# Patient Record
Sex: FEMALE | Race: WHITE | Employment: OTHER | URBAN - METROPOLITAN AREA
[De-identification: names, ages, dates, MRNs, and addresses within clinical notes are randomized per-mention and may not be internally consistent; named-entity substitution may affect disease eponyms.]

---

## 2020-08-06 VITALS
WEIGHT: 164 LBS | HEIGHT: 65 IN | DIASTOLIC BLOOD PRESSURE: 80 MMHG | RESPIRATION RATE: 18 BRPM | OXYGEN SATURATION: 97 % | BODY MASS INDEX: 27.32 KG/M2 | HEART RATE: 95 BPM | SYSTOLIC BLOOD PRESSURE: 153 MMHG | TEMPERATURE: 98.4 F

## 2020-08-06 PROBLEM — E03.9 ACQUIRED HYPOTHYROIDISM: Status: ACTIVE | Noted: 2020-08-06

## 2020-08-06 PROBLEM — E78.5 HYPERLIPIDEMIA: Status: ACTIVE | Noted: 2020-08-06

## 2020-08-06 PROBLEM — I10 ESSENTIAL HYPERTENSION: Status: ACTIVE | Noted: 2020-08-06

## 2020-08-06 PROBLEM — K27.9 PEPTIC ULCER: Status: ACTIVE | Noted: 2020-08-06

## 2020-08-06 PROBLEM — F41.9 ANXIETY DISORDER: Status: ACTIVE | Noted: 2020-08-06

## 2020-08-06 PROBLEM — I83.90 VARICOSE VEINS OF LOWER EXTREMITY: Status: ACTIVE | Noted: 2020-08-06

## 2020-08-06 PROBLEM — F90.0 ATTENTION-DEFICIT HYPERACTIVITY DISORDER, PREDOMINANTLY INATTENTIVE TYPE: Status: ACTIVE | Noted: 2020-08-06

## 2020-08-06 RX ORDER — DEXTROAMPHETAMINE SACCHARATE, AMPHETAMINE ASPARTATE, DEXTROAMPHETAMINE SULFATE AND AMPHETAMINE SULFATE 7.5; 7.5; 7.5; 7.5 MG/1; MG/1; MG/1; MG/1
TABLET ORAL
COMMUNITY
End: 2020-08-09 | Stop reason: SDUPTHER

## 2020-08-06 RX ORDER — LEVOTHYROXINE SODIUM 200 UG/1
TABLET ORAL
COMMUNITY
Start: 2020-07-29 | End: 2020-10-21

## 2020-08-06 RX ORDER — ERGOCALCIFEROL 1.25 MG/1
CAPSULE ORAL
COMMUNITY
End: 2020-09-28

## 2020-08-06 RX ORDER — PAROXETINE HYDROCHLORIDE 40 MG/1
TABLET, FILM COATED ORAL
COMMUNITY
Start: 2020-07-10 | End: 2020-10-15

## 2020-08-06 RX ORDER — PANTOPRAZOLE SODIUM 40 MG/1
TABLET, DELAYED RELEASE ORAL
COMMUNITY
Start: 2020-06-07 | End: 2020-09-07

## 2020-08-06 RX ORDER — SOLIFENACIN SUCCINATE 10 MG/1
TABLET, FILM COATED ORAL
COMMUNITY

## 2020-08-06 RX ORDER — AMLODIPINE BESYLATE 5 MG/1
TABLET ORAL
COMMUNITY
Start: 2020-07-10 | End: 2020-10-15

## 2020-08-07 ENCOUNTER — VIRTUAL VISIT (OUTPATIENT)
Dept: PRIMARY CARE CLINIC | Age: 70
End: 2020-08-07
Payer: MEDICARE

## 2020-08-07 DIAGNOSIS — E78.2 MIXED HYPERLIPIDEMIA: ICD-10-CM

## 2020-08-07 DIAGNOSIS — F90.0 ATTENTION-DEFICIT HYPERACTIVITY DISORDER, PREDOMINANTLY INATTENTIVE TYPE: ICD-10-CM

## 2020-08-07 DIAGNOSIS — I10 ESSENTIAL HYPERTENSION: Primary | ICD-10-CM

## 2020-08-07 DIAGNOSIS — R60.0 PEDAL EDEMA: ICD-10-CM

## 2020-08-07 DIAGNOSIS — E03.9 ACQUIRED HYPOTHYROIDISM: ICD-10-CM

## 2020-08-07 PROCEDURE — G8536 NO DOC ELDER MAL SCRN: HCPCS | Performed by: FAMILY MEDICINE

## 2020-08-07 PROCEDURE — 99213 OFFICE O/P EST LOW 20 MIN: CPT | Performed by: FAMILY MEDICINE

## 2020-08-07 PROCEDURE — G8427 DOCREV CUR MEDS BY ELIG CLIN: HCPCS | Performed by: FAMILY MEDICINE

## 2020-08-07 PROCEDURE — G8432 DEP SCR NOT DOC, RNG: HCPCS | Performed by: FAMILY MEDICINE

## 2020-08-07 PROCEDURE — G8419 CALC BMI OUT NRM PARAM NOF/U: HCPCS | Performed by: FAMILY MEDICINE

## 2020-08-07 PROCEDURE — G8400 PT W/DXA NO RESULTS DOC: HCPCS | Performed by: FAMILY MEDICINE

## 2020-08-09 RX ORDER — DEXTROAMPHETAMINE SACCHARATE, AMPHETAMINE ASPARTATE, DEXTROAMPHETAMINE SULFATE AND AMPHETAMINE SULFATE 7.5; 7.5; 7.5; 7.5 MG/1; MG/1; MG/1; MG/1
30 TABLET ORAL DAILY
Qty: 90 TAB | Refills: 0 | Status: SHIPPED | OUTPATIENT
Start: 2020-08-09 | End: 2021-04-19 | Stop reason: SDUPTHER

## 2020-08-10 NOTE — PATIENT INSTRUCTIONS
Raynaud's Phenomenon: Care Instructions Overview Raynaud's is a condition that causes your hands and feet to overreact to cold. They may become painful and numb, and they can change colors, becoming very pale and then blue. This condition also is called Raynaud's phenomenon. There are two kinds of Raynaud's. Primary Raynaud's, also known as Raynaud's disease, happens by itself and is the most common form. Secondary Raynaud's, also called Raynaud's syndrome, happens as part of another disease. In Raynaud's, the small vessels that bring blood to the skin either become narrow, or constrict for a short period of time. This limits blood flow to the hands and feet and sometimes to the nose or ears. Your hands and feet feel cold and numb and then turn very pale. As blood flow returns, your fingers and toes may turn red, and begin to throb and hurt. Raynaud's can be painful and annoying, but it usually does not cause serious problems. You can take simple steps to protect your hands and feet from the cold. If you have a bad case of Raynaud's and you cannot keep your hands and feet warm enough, your doctor may prescribe medicine. Follow-up care is a key part of your treatment and safety. Be sure to make and go to all appointments, and call your doctor if you are having problems. It's also a good idea to know your test results and keep a list of the medicines you take. How can you care for yourself at home? To prevent Raynaud's episodes or ease symptoms · Run warm water over your hands or feet to increase blood flow. Use another part of your body, such as your forearm, to make sure the water is not too hot; you could burn your hands or feet and not feel it because they are numb. · Swing your arms in a [place] at the sides of your body (\"windmilling\") to increase blood flow.  
· If your doctor prescribes medicine to help Raynaud's, take it exactly as prescribed. Call your doctor if you think you are having a problem with your medicine. · If another condition causes your Raynaud's, make sure to follow your treatment for that condition. · Wear mittens or gloves when it is cold outside. Mittens are warmer than gloves because they keep your fingers together. Gloves underneath mittens will keep your hands warmer than gloves alone. You also can use pot holders or oven mitts when getting something from the freezer or refrigerator. · You can slip chemical hand warmers into your mittens or gloves when you do outside activities. · Do not smoke. Nicotine makes blood vessels constrict, which can bring on an attack. If you need help quitting, talk to your doctor about stop-smoking programs and medicines. These can increase your chances of quitting for good. · Avoid caffeine and cold medicines that have pseudoephedrine. They make blood vessels constrict. Beta-blocker medicines, often used to treat high blood pressure, also can make Raynaud's worse. · Drink plenty of fluids to prevent dehydration, which can lower the amount of blood moving through the blood vessels. If you have kidney, heart, or liver disease and have to limit fluids, talk with your doctor before you increase the amount of fluids you drink. · Try to stay calm when you are under stress. Anxiety can make your blood vessels constrict and lead to a Raynaud's attack. To keep your whole body warm · Eat a hot meal and drink a warm liquid before going outside. They may help raise your body temperature. · Wear layers of warm clothing. The inner layer should be made of a material such as polypropylene that pulls moisture away from your body. · Wear a hat. · Do not wear clothing that is too tight. It can decrease or cut off blood flow. · Try to stay dry. Choose waterproof, breathable jackets and boots. Being wet makes you more likely to become chilled. When should you call for help? Call your doctor now or seek immediate medical care if: 
· You have severe pain in your hands or feet. · Normal color does not return to your hands or feet. · Your hands or feet do not warm up even after home care. Watch closely for changes in your health, and be sure to contact your doctor if you have any problems. Where can you learn more? Go to http://erica-emelyn.info/ Enter P043 in the search box to learn more about \"Raynaud's Phenomenon: Care Instructions. \" Current as of: December 9, 2019               Content Version: 12.5 © 4815-8810 [employer], [employer]. Care instructions adapted under license by [employer] (which disclaims liability or warranty for this information). If you have questions about a medical condition or this instruction, always ask your healthcare professional. [address] any warranty or liability for your use of this information.

## 2020-08-10 NOTE — PROGRESS NOTES
Sonali Hutson is a 69 y.o. female who was seen by synchronous (real-time) audio-video technology on 8/7/2020 for Attention Deficit Disorder        Assessment & Plan:   Diagnoses and all orders for this visit:    1. Essential hypertension    2. Acquired hypothyroidism    3. Attention-deficit hyperactivity disorder, predominantly inattentive type    4. Mixed hyperlipidemia    5. Pedal edema            Subjective: This patient agrees to a virtual visit to refill her medication and to discuss her rainouts phenomenon. She has been taking amlodipine 5 mg for this and it is causing swelling in her legs which she is concerned about. It did help the symptoms however she would like something different. She has not gotten the lab work we ordered at her last visit and she plans to do that in the next couple weeks. Overall she is doing well and working more than she had in the past as patients want to come in and see her rather than do virtual visits. She seems to be doing okay with that. Prior to Admission medications    Medication Sig Start Date End Date Taking? Authorizing Provider   amLODIPine (NORVASC) 5 mg tablet TAKE 1 TABLET BY MOUTH EVERY DAY 7/10/20   Provider, Historical   dextroamphetamine-amphetamine (ADDERALL) 30 mg tablet dextroamphetamine-amphetamine 30 mg tablet   Take 1 tablet every day by oral route.     Provider, Historical   ergocalciferol (ERGOCALCIFEROL) 1,250 mcg (50,000 unit) capsule ergocalciferol (vitamin D2) 1,250 mcg (50,000 unit) capsule   TAKE ONE CAPSULE BY MOUTH ONE TIME PER WEEK    Provider, Historical   levothyroxine (SYNTHROID) 200 mcg tablet TAKE 1 TABLET BY MOUTH EVERY DAY 7/29/20   Provider, Historical   pantoprazole (PROTONIX) 40 mg tablet TAKE 1 TABLET BY MOUTH EVERY DAY 6/7/20   Provider, Historical   PARoxetine (PAXIL) 40 mg tablet TAKE 1 TABLET BY MOUTH EVERY DAY 7/10/20   Provider, Historical   solifenacin (VESIcare) 10 mg tablet Vesicare 10 mg tablet   TAKE 1 TABLET BY MOUTH EVERY DAY    Provider, Historical     Patient Active Problem List   Diagnosis Code    Essential hypertension I10    Hyperlipidemia E78.5    OA (osteoarthritis) of knee M17.10    Varicose veins of lower extremity I83.90    Anxiety disorder F41.9    Attention-deficit hyperactivity disorder, predominantly inattentive type F90.0    Acquired hypothyroidism E03.9    Peptic ulcer K27.9     Patient Active Problem List    Diagnosis Date Noted    Essential hypertension 08/06/2020    Hyperlipidemia 08/06/2020    Varicose veins of lower extremity 08/06/2020    Anxiety disorder 08/06/2020    Attention-deficit hyperactivity disorder, predominantly inattentive type 08/06/2020    Acquired hypothyroidism 08/06/2020    Peptic ulcer 08/06/2020    OA (osteoarthritis) of knee 06/06/2014     Current Outpatient Medications   Medication Sig Dispense Refill    amLODIPine (NORVASC) 5 mg tablet TAKE 1 TABLET BY MOUTH EVERY DAY      dextroamphetamine-amphetamine (ADDERALL) 30 mg tablet dextroamphetamine-amphetamine 30 mg tablet   Take 1 tablet every day by oral route.       ergocalciferol (ERGOCALCIFEROL) 1,250 mcg (50,000 unit) capsule ergocalciferol (vitamin D2) 1,250 mcg (50,000 unit) capsule   TAKE ONE CAPSULE BY MOUTH ONE TIME PER WEEK      levothyroxine (SYNTHROID) 200 mcg tablet TAKE 1 TABLET BY MOUTH EVERY DAY      pantoprazole (PROTONIX) 40 mg tablet TAKE 1 TABLET BY MOUTH EVERY DAY      PARoxetine (PAXIL) 40 mg tablet TAKE 1 TABLET BY MOUTH EVERY DAY      solifenacin (VESIcare) 10 mg tablet Vesicare 10 mg tablet   TAKE 1 TABLET BY MOUTH EVERY DAY       Allergies   Allergen Reactions    Codeine Rash     Past Medical History:   Diagnosis Date    Acquired hypothyroidism 8/6/2020    Attention-deficit hyperactivity disorder, predominantly inattentive type 8/6/2020    Essential hypertension 8/6/2020    Hyperlipidemia 8/6/2020    OA (osteoarthritis) of knee 6/6/2014    Peptic ulcer 8/6/2020    Varicose veins of lower extremity 8/6/2020     Past Surgical History:   Procedure Laterality Date    HX COLONOSCOPY  07/13/2006    HX KNEE REPLACEMENT Right 01/2007    HX ORTHOPAEDIC      Right knee     Family History   Family history unknown: Yes     Social History     Tobacco Use    Smoking status: Former Smoker    Smokeless tobacco: Never Used   Substance Use Topics    Alcohol use: Yes     Comment: Occasionally       ROS    Objective:   No flowsheet data found.      [INSTRUCTIONS:  \"[x]\" Indicates a positive item  \"[]\" Indicates a negative item  -- DELETE ALL ITEMS NOT EXAMINED]    Constitutional: [x] Appears well-developed and well-nourished [x] No apparent distress      [] Abnormal -     Mental status: [x] Alert and awake  [x] Oriented to person/place/time [x] Able to follow commands    [] Abnormal -     Eyes:   EOM    [x]  Normal    [] Abnormal -   Sclera  [x]  Normal    [] Abnormal -          Discharge [x]  None visible   [] Abnormal -     HENT: [x] Normocephalic, atraumatic  [] Abnormal -   [x] Mouth/Throat: Mucous membranes are moist    External Ears [x] Normal  [] Abnormal -    Neck: [x] No visualized mass [] Abnormal -     Pulmonary/Chest: [x] Respiratory effort normal   [x] No visualized signs of difficulty breathing or respiratory distress        [] Abnormal -      Musculoskeletal:   [x] Normal gait with no signs of ataxia         [x] Normal range of motion of neck        [] Abnormal -     Neurological:        [x] No Facial Asymmetry (Cranial nerve 7 motor function) (limited exam due to video visit)          [x] No gaze palsy        [] Abnormal -          Skin:        [x] No significant exanthematous lesions or discoloration noted on facial skin         [] Abnormal -            Psychiatric:       [x] Normal Affect [] Abnormal -        [x] No Hallucinations    Other pertinent observable physical exam findings:-        We discussed the expected course, resolution and complications of the diagnosis(es) in detail. Medication risks, benefits, costs, interactions, and alternatives were discussed as indicated. I advised her to contact the office if her condition worsens, changes or fails to improve as anticipated. She expressed understanding with the diagnosis(es) and plan. Jasson Troy, who was evaluated through a patient-initiated, synchronous (real-time) audio-video encounter, and/or her healthcare decision maker, is aware that it is a billable service, with coverage as determined by her insurance carrier. She provided verbal consent to proceed: Yes, and patient identification was verified. It was conducted pursuant to the emergency declaration under the [address] [place] [address], [address] [place] [address] authority and the [employer] General Act. A caregiver was present when appropriate. Ability to conduct physical exam was limited. I was at home. The patient was at home.       Grant Bennett MD

## 2020-08-26 LAB
25(OH)D3+25(OH)D2 SERPL-MCNC: 57.9 NG/ML (ref 30–100)
ALBUMIN SERPL-MCNC: 4.2 G/DL (ref 3.7–4.7)
ALBUMIN/GLOB SERPL: 1.4 {RATIO} (ref 1.2–2.2)
ALP SERPL-CCNC: 160 IU/L (ref 39–117)
ALT SERPL-CCNC: 16 IU/L (ref 0–32)
APPEARANCE UR: CLEAR
AST SERPL-CCNC: 24 IU/L (ref 0–40)
BACTERIA #/AREA URNS HPF: NORMAL /[HPF]
BASOPHILS # BLD AUTO: 0.1 X10E3/UL (ref 0–0.2)
BASOPHILS NFR BLD AUTO: 1 %
BILIRUB SERPL-MCNC: 0.3 MG/DL (ref 0–1.2)
BILIRUB UR QL STRIP: NEGATIVE
BUN SERPL-MCNC: 16 MG/DL (ref 8–27)
BUN/CREAT SERPL: 15 (ref 12–28)
CALCIUM SERPL-MCNC: 9.6 MG/DL (ref 8.7–10.3)
CASTS URNS QL MICRO: NORMAL /LPF
CHLORIDE SERPL-SCNC: 98 MMOL/L (ref 96–106)
CHOLEST SERPL-MCNC: 230 MG/DL (ref 100–199)
CO2 SERPL-SCNC: 25 MMOL/L (ref 20–29)
COLOR UR: YELLOW
CREAT SERPL-MCNC: 1.06 MG/DL (ref 0.57–1)
EOSINOPHIL # BLD AUTO: 0.5 X10E3/UL (ref 0–0.4)
EOSINOPHIL NFR BLD AUTO: 7 %
EPI CELLS #/AREA URNS HPF: NORMAL /HPF (ref 0–10)
ERYTHROCYTE [DISTWIDTH] IN BLOOD BY AUTOMATED COUNT: 13.2 % (ref 11.7–15.4)
GLOBULIN SER CALC-MCNC: 2.9 G/DL (ref 1.5–4.5)
GLUCOSE SERPL-MCNC: 85 MG/DL (ref 65–99)
GLUCOSE UR QL: NEGATIVE
HCT VFR BLD AUTO: 39 % (ref 34–46.6)
HDLC SERPL-MCNC: 77 MG/DL
HGB BLD-MCNC: 12.2 G/DL (ref 11.1–15.9)
HGB UR QL STRIP: NEGATIVE
IMM GRANULOCYTES # BLD AUTO: 0 X10E3/UL (ref 0–0.1)
IMM GRANULOCYTES NFR BLD AUTO: 0 %
KETONES UR QL STRIP: NEGATIVE
LDLC SERPL CALC-MCNC: 133 MG/DL (ref 0–99)
LEUKOCYTE ESTERASE UR QL STRIP: NEGATIVE
LYMPHOCYTES # BLD AUTO: 0.9 X10E3/UL (ref 0.7–3.1)
LYMPHOCYTES NFR BLD AUTO: 13 %
MCH RBC QN AUTO: 27.5 PG (ref 26.6–33)
MCHC RBC AUTO-ENTMCNC: 31.3 G/DL (ref 31.5–35.7)
MCV RBC AUTO: 88 FL (ref 79–97)
MICRO URNS: ABNORMAL
MICRO URNS: ABNORMAL
MONOCYTES # BLD AUTO: 0.4 X10E3/UL (ref 0.1–0.9)
MONOCYTES NFR BLD AUTO: 6 %
MUCOUS THREADS URNS QL MICRO: PRESENT
NEUTROPHILS # BLD AUTO: 5.3 X10E3/UL (ref 1.4–7)
NEUTROPHILS NFR BLD AUTO: 73 %
NITRITE UR QL STRIP: NEGATIVE
PH UR STRIP: 8.5 [PH] (ref 5–7.5)
PLATELET # BLD AUTO: 390 X10E3/UL (ref 150–450)
POTASSIUM SERPL-SCNC: 5.3 MMOL/L (ref 3.5–5.2)
PROT SERPL-MCNC: 7.1 G/DL (ref 6–8.5)
PROT UR QL STRIP: NEGATIVE
RBC # BLD AUTO: 4.43 X10E6/UL (ref 3.77–5.28)
RBC #/AREA URNS HPF: NORMAL /HPF (ref 0–2)
SODIUM SERPL-SCNC: 138 MMOL/L (ref 134–144)
SP GR UR: 1.02 (ref 1–1.03)
SPECIMEN STATUS REPORT, ROLRST: NORMAL
T4 FREE SERPL DIALY-MCNC: 2 NG/DL
TRIGL SERPL-MCNC: 100 MG/DL (ref 0–149)
TSH SERPL-ACNC: 0.25 UU/ML
UROBILINOGEN UR STRIP-MCNC: 0.2 MG/DL (ref 0.2–1)
VLDLC SERPL CALC-MCNC: 20 MG/DL (ref 5–40)
WBC # BLD AUTO: 7.2 X10E3/UL (ref 3.4–10.8)
WBC #/AREA URNS HPF: NORMAL /HPF (ref 0–5)

## 2020-08-28 ENCOUNTER — TELEPHONE (OUTPATIENT)
Dept: PRIMARY CARE CLINIC | Age: 70
End: 2020-08-28

## 2020-08-28 NOTE — TELEPHONE ENCOUNTER
----- Message from Med Meyer MD sent at 8/26/2020  9:42 PM EDT -----  Inform the patient that her lab results were normal except for the following:  Kidney function is just slightly below normal.  LDL cholesterol slightly up but she has a very high HDL or good kind of cholesterol. Her T4 was slightly up but the TSH was normal.  Overall these are essentially normal and no change in her treatment is needed.

## 2020-09-01 ENCOUNTER — TELEPHONE (OUTPATIENT)
Dept: PRIMARY CARE CLINIC | Age: 70
End: 2020-09-01

## 2020-09-03 DIAGNOSIS — I73.00 RAYNAUD'S DISEASE WITHOUT GANGRENE: Primary | ICD-10-CM

## 2020-09-03 RX ORDER — SILDENAFIL CITRATE 20 MG/1
20 TABLET ORAL 2 TIMES DAILY
Qty: 180 TAB | Refills: 1 | Status: SHIPPED | OUTPATIENT
Start: 2020-09-03 | End: 2021-04-19 | Stop reason: ALTCHOICE

## 2020-09-03 NOTE — PROGRESS NOTES
Developed edema of her legs on nifedipine  Checked with [facility] and they recommend sildenafil 20 mg 2-3 times a day as a second line therapy

## 2020-09-07 RX ORDER — PANTOPRAZOLE SODIUM 40 MG/1
TABLET, DELAYED RELEASE ORAL
Qty: 90 TAB | Refills: 1 | Status: SHIPPED | OUTPATIENT
Start: 2020-09-07 | End: 2021-04-19 | Stop reason: ALTCHOICE

## 2020-09-22 DIAGNOSIS — E55.9 VITAMIN D DEFICIENCY, UNSPECIFIED: ICD-10-CM

## 2020-09-28 RX ORDER — ERGOCALCIFEROL 1.25 MG/1
CAPSULE ORAL
Qty: 12 CAP | Refills: 1 | Status: SHIPPED | OUTPATIENT
Start: 2020-09-28 | End: 2021-01-25 | Stop reason: SDUPTHER

## 2020-10-14 DIAGNOSIS — I73.00 RAYNAUD'S SYNDROME WITHOUT GANGRENE: ICD-10-CM

## 2020-10-14 DIAGNOSIS — F41.9 ANXIETY DISORDER, UNSPECIFIED: ICD-10-CM

## 2020-10-15 RX ORDER — AMLODIPINE BESYLATE 5 MG/1
TABLET ORAL
Qty: 90 TAB | Refills: 1 | Status: SHIPPED | OUTPATIENT
Start: 2020-10-15 | End: 2021-02-13 | Stop reason: SDUPTHER

## 2020-10-15 RX ORDER — PAROXETINE HYDROCHLORIDE 40 MG/1
TABLET, FILM COATED ORAL
Qty: 90 TAB | Refills: 1 | Status: SHIPPED | OUTPATIENT
Start: 2020-10-15 | End: 2021-06-28 | Stop reason: SDUPTHER

## 2020-10-21 DIAGNOSIS — E03.9 HYPOTHYROIDISM, UNSPECIFIED: ICD-10-CM

## 2020-10-21 RX ORDER — LEVOTHYROXINE SODIUM 200 UG/1
TABLET ORAL
Qty: 90 TAB | Refills: 1 | Status: SHIPPED | OUTPATIENT
Start: 2020-10-21 | End: 2021-06-22 | Stop reason: SDUPTHER

## 2021-01-25 DIAGNOSIS — E55.9 VITAMIN D DEFICIENCY, UNSPECIFIED: ICD-10-CM

## 2021-01-25 RX ORDER — ERGOCALCIFEROL 1.25 MG/1
CAPSULE ORAL
Qty: 12 CAP | Refills: 1 | Status: SHIPPED | OUTPATIENT
Start: 2021-01-25 | End: 2021-08-16 | Stop reason: SDUPTHER

## 2021-02-08 ENCOUNTER — TELEPHONE (OUTPATIENT)
Dept: PRIMARY CARE CLINIC | Age: 71
End: 2021-02-08

## 2021-02-08 DIAGNOSIS — I73.00 RAYNAUD'S DISEASE WITHOUT GANGRENE: Primary | ICD-10-CM

## 2021-02-08 RX ORDER — NIFEDIPINE 30 MG/1
30 TABLET, FILM COATED, EXTENDED RELEASE ORAL DAILY
COMMUNITY
End: 2021-02-08 | Stop reason: SDUPTHER

## 2021-02-13 RX ORDER — NIFEDIPINE 30 MG/1
30 TABLET, FILM COATED, EXTENDED RELEASE ORAL DAILY
Qty: 90 TAB | Refills: 0 | Status: SHIPPED | OUTPATIENT
Start: 2021-02-13 | End: 2021-04-19 | Stop reason: ALTCHOICE

## 2021-04-19 ENCOUNTER — VIRTUAL VISIT (OUTPATIENT)
Dept: PRIMARY CARE CLINIC | Age: 71
End: 2021-04-19
Payer: MEDICARE

## 2021-04-19 DIAGNOSIS — F41.9 ANXIETY DISORDER, UNSPECIFIED TYPE: ICD-10-CM

## 2021-04-19 DIAGNOSIS — E78.2 MIXED HYPERLIPIDEMIA: ICD-10-CM

## 2021-04-19 DIAGNOSIS — I77.6: ICD-10-CM

## 2021-04-19 DIAGNOSIS — I10 ESSENTIAL HYPERTENSION: Primary | ICD-10-CM

## 2021-04-19 DIAGNOSIS — E03.9 ACQUIRED HYPOTHYROIDISM: ICD-10-CM

## 2021-04-19 DIAGNOSIS — F90.0 ATTENTION-DEFICIT HYPERACTIVITY DISORDER, PREDOMINANTLY INATTENTIVE TYPE: ICD-10-CM

## 2021-04-19 PROCEDURE — G8756 NO BP MEASURE DOC: HCPCS | Performed by: FAMILY MEDICINE

## 2021-04-19 PROCEDURE — 3017F COLORECTAL CA SCREEN DOC REV: CPT | Performed by: FAMILY MEDICINE

## 2021-04-19 PROCEDURE — 1101F PT FALLS ASSESS-DOCD LE1/YR: CPT | Performed by: FAMILY MEDICINE

## 2021-04-19 PROCEDURE — 99214 OFFICE O/P EST MOD 30 MIN: CPT | Performed by: FAMILY MEDICINE

## 2021-04-19 PROCEDURE — G8536 NO DOC ELDER MAL SCRN: HCPCS | Performed by: FAMILY MEDICINE

## 2021-04-19 PROCEDURE — 1090F PRES/ABSN URINE INCON ASSESS: CPT | Performed by: FAMILY MEDICINE

## 2021-04-19 PROCEDURE — G8427 DOCREV CUR MEDS BY ELIG CLIN: HCPCS | Performed by: FAMILY MEDICINE

## 2021-04-19 PROCEDURE — G8419 CALC BMI OUT NRM PARAM NOF/U: HCPCS | Performed by: FAMILY MEDICINE

## 2021-04-19 PROCEDURE — G8432 DEP SCR NOT DOC, RNG: HCPCS | Performed by: FAMILY MEDICINE

## 2021-04-19 PROCEDURE — G8400 PT W/DXA NO RESULTS DOC: HCPCS | Performed by: FAMILY MEDICINE

## 2021-04-19 RX ORDER — ALENDRONATE SODIUM 70 MG/1
TABLET ORAL
COMMUNITY
Start: 2021-03-28 | End: 2022-05-29

## 2021-04-19 RX ORDER — PREDNISONE 10 MG/1
TABLET ORAL
COMMUNITY
Start: 2021-03-31 | End: 2022-05-29

## 2021-04-19 RX ORDER — DEXTROAMPHETAMINE SACCHARATE, AMPHETAMINE ASPARTATE, DEXTROAMPHETAMINE SULFATE AND AMPHETAMINE SULFATE 7.5; 7.5; 7.5; 7.5 MG/1; MG/1; MG/1; MG/1
30 TABLET ORAL DAILY
Qty: 30 TAB | Refills: 0 | Status: SHIPPED | OUTPATIENT
Start: 2021-04-19 | End: 2021-05-19

## 2021-04-19 RX ORDER — DEXTROAMPHETAMINE SACCHARATE, AMPHETAMINE ASPARTATE, DEXTROAMPHETAMINE SULFATE AND AMPHETAMINE SULFATE 7.5; 7.5; 7.5; 7.5 MG/1; MG/1; MG/1; MG/1
30 TABLET ORAL DAILY
Qty: 30 TAB | Refills: 0 | Status: SHIPPED | OUTPATIENT
Start: 2021-06-21 | End: 2021-07-21 | Stop reason: SDUPTHER

## 2021-04-19 RX ORDER — AZATHIOPRINE 50 MG/1
TABLET ORAL
COMMUNITY
Start: 2021-02-08 | End: 2022-05-29

## 2021-04-19 RX ORDER — ZOSTER VACCINE RECOMBINANT, ADJUVANTED 50 MCG/0.5
KIT INTRAMUSCULAR
COMMUNITY

## 2021-04-19 RX ORDER — DEXTROAMPHETAMINE SACCHARATE, AMPHETAMINE ASPARTATE, DEXTROAMPHETAMINE SULFATE AND AMPHETAMINE SULFATE 7.5; 7.5; 7.5; 7.5 MG/1; MG/1; MG/1; MG/1
30 TABLET ORAL DAILY
Qty: 31 TAB | Refills: 0 | Status: SHIPPED | OUTPATIENT
Start: 2021-05-20 | End: 2021-06-20

## 2021-04-19 NOTE — PATIENT INSTRUCTIONS
Attention Deficit Hyperactivity Disorder (ADHD) in Adults: Care Instructions Your Care Instructions Attention deficit hyperactivity disorder, or ADHD, is a condition that makes it hard to pay attention. So you may have problems when you try to focus, get organized, and finish tasks. It might make you more active than other people. Or you might do things without thinking first. 
ADHD is very common. It usually starts in early childhood. Many adults don't realize they have it until their children are diagnosed. Then they become aware of their own symptoms. Doctors don't know what causes ADHD. But it often runs in families. ADHD can be treated with medicines, behavior training, and counseling. Treatment can improve your life. Follow-up care is a key part of your treatment and safety. Be sure to make and go to all appointments, and call your doctor if you are having problems. It's also a good idea to know your test results and keep a list of the medicines you take. How can you care for yourself at home? · Learn all you can about ADHD. This will help you and your family understand it better. · Take your medicines exactly as prescribed. Call your doctor if you think you are having a problem with your medicine. You will get more details on the specific medicines your doctor prescribes. · If you miss a dose of your medicine, do not take an extra dose. · If your doctor suggests counseling, find a counselor you like and trust. Talk openly and honestly. Be willing to make some changes. · Find a support group for adults with ADHD. Talking to others with the same problems can help you feel better. It can also give you ideas about how to best cope with the condition. · Get rid of distractions at your work space. Keep your desk clean. Try not to face a window or busy hallway. · Use files, planners, and other tools to keep you organized. · Limit use of alcohol, and do not use illegal drugs.  People with ADHD tend to develop substance use disorder more easily than others. Tell your doctor if you need help to quit. Counseling, support groups, and sometimes medicines can help you stay free of alcohol or drugs. · Get at least 30 minutes of physical activity on most days of the week. Exercise has been shown to help people cope with ADHD. Walking is a good choice. You also may want to do other activities, such as running, swimming, cycling, or playing tennis or team sports. When should you call for help? Watch closely for changes in your health, and be sure to contact your doctor if: 
  · You feel sad a lot or cry all the time.  
  · You have trouble sleeping, or you sleep too much.  
  · You find it hard to concentrate, make decisions, or remember things.  
  · You change how you normally eat.  
  · You feel guilty for no reason. Where can you learn more? Go to http://www.jacob.com/ Enter B196 in the search box to learn more about \"Attention Deficit Hyperactivity Disorder (ADHD) in Adults: Care Instructions. \" Current as of: September 23, 2020               Content Version: 12.8 © 0373-9339 [employer]. Care instructions adapted under license by [employer] (which disclaims liability or warranty for this information). If you have questions about a medical condition or this instruction, always ask your healthcare professional. [address] any warranty or liability for your use of this information. Hypothyroidism: Care Instructions Your Care Instructions When you have hypothyroidism, your body doesn't make enough thyroid hormone. This hormone helps your body use energy. If your thyroid level is low, you may feel tired, be constipated, have an increase in your blood pressure, or have dry skin or memory problems. You may also get cold easily, even when it is warm. Women with low thyroid levels may have heavy menstrual periods.  
A blood test to find your thyroid-stimulating hormone (TSH) level is used to check for hypothyroidism. A high TSH level may mean that you have it. The treatment for hypothyroidism is thyroid hormone pills. You should start to feel better in 1 to 2 weeks. Most people need treatment for the rest of their lives. You will need regular visits with your doctor to make sure you are doing well and that you have the right dose of medicine. Follow-up care is a key part of your treatment and safety. Be sure to make and go to all appointments, and call your doctor if you are having problems. It's also a good idea to know your test results and keep a list of the medicines you take. How can you care for yourself at home? · Take your thyroid hormone medicine exactly as prescribed. Call your doctor if you think you are having a problem with your medicine. Most people do not have side effects if they take the right amount of medicine regularly. ? Take the medicine 30 minutes before breakfast, and do not take it with calcium, vitamins, or iron. ? Do not take extra doses of your thyroid medicine. It will not help you get better any faster, and it may cause side effects. ? If you forget to take a dose, do NOT take a double dose of medicine. Take your usual dose the next day. · Tell your doctor about all prescription, herbal, or over-the-counter products you take. · Take care of yourself. Eat a healthy diet, get enough sleep, and get regular exercise. When should you call for help? Call 911 anytime you think you may need emergency care. For example, call if: 
  · You passed out (lost consciousness).  
  · You have severe trouble breathing.  
  · You have a very slow heartbeat (less than 60 beats a minute).  
  · You have a low body temperature (95°F or below).   
Call your doctor now or seek immediate medical care if: 
  · You feel tired, sluggish, or weak.  
  · You have trouble remembering things or concentrating.  
  · You do not begin to feel better 2 weeks after starting your medicine. Watch closely for changes in your health, and be sure to contact your doctor if you have any problems. Where can you learn more? Go to http://www.gray.com/ Enter Z070 in the search box to learn more about \"Hypothyroidism: Care Instructions. \" Current as of: March 31, 2020               Content Version: 12.8 © 2006-2021 [employer]. Care instructions adapted under license by [employer] (which disclaims liability or warranty for this information). If you have questions about a medical condition or this instruction, always ask your healthcare professional. [address] any warranty or liability for your use of this information.

## 2021-04-19 NOTE — PROGRESS NOTES
Lester Everett (: 1950) is a 66 y.o. female, established patient, here for evaluation of the following chief complaint(s):   Attention Deficit Disorder, Anxiety, Hypertension, Osteoarthritis, and Hypothyroidism       ASSESSMENT/PLAN:  1. Essential hypertension  2. Acquired hypothyroidism  3. Mixed hyperlipidemia  4. Anxiety disorder, unspecified type  5. Attention-deficit hyperactivity disorder, predominantly inattentive type  -     dextroamphetamine-amphetamine (ADDERALL) 30 mg tablet; Take 1 Tab by mouth daily for 30 days. Max Daily Amount: 1 Tab., Normal, Disp-30 Tab, R-0  -     dextroamphetamine-amphetamine (ADDERALL) 30 mg tablet; Take 1 Tab by mouth daily for 31 days. Max Daily Amount: 1 Tab., Normal, Disp-31 Tab, R-0  -     dextroamphetamine-amphetamine (ADDERALL) 30 mg tablet; Take 1 Tab by mouth daily for 30 days. Max Daily Amount: 1 Tab., Normal, Disp-30 Tab, R-0  6. Medium vessel vasculitis ([address]      Return in about 6 months (around 10/19/2021) for Follow up of chronic medical conditions, Fasting Lab Appointment. SUBJECTIVE/OBJECTIVE:  This patient requested a virtual video visit in order to refill her Adderall, review her recent medical history which is changed as well as for follow-up of her hypertension, hypothyroid disease, osteoarthritis. She had had a papular rash on her legs for several months and saw several specialist and finally saw a rheumatologist in [place] who diagnosed medium vein vasculitis. She now is on prednisone as well as several other medications in the condition is under control. She did have some lab work done and she will asked the doctor to send them to us so we do not repeat any. Overall she is doing well she continues to work but now has a new associate so is not feeling as stressed. She is playing pickle ball for exercise now and seems to be in good spirits.   She did discontinue the Adderall for several months but feels that she is more productive and can get her work done more efficiently if she takes it and requests that we restart this. She always is taking this according to our drug policy. She sees Dr. Darrold Bernheim for her well woman exam and mammograms. Review of Systems   Constitutional: Negative. Respiratory: Negative. Cardiovascular: Negative. Gastrointestinal: Negative. Endocrine: Negative. Genitourinary: Negative. Musculoskeletal: Negative. Allergic/Immunologic: Negative. Neurological: Negative. Hematological: Negative. Psychiatric/Behavioral: Negative. No flowsheet data found. Physical Exam  Constitutional:       General: She is not in acute distress. Appearance: Normal appearance. She is normal weight. Neurological:      Mental Status: She is alert. Psychiatric:         Mood and Affect: Mood normal.         Behavior: Behavior normal.         Thought Content: Thought content normal.         Judgment: Judgment normal.       Overall this patient seems to be doing quite well. I agree with prescribing her Adderall as she is always been compliant and she uses this specifically to stay focused for her work. She is never shown any signs of substance abuse. She will continue to follow-up with her rheumatologist for vasculitis. She will ask her to forward her records including recent labs. She sees Dr. Darrold Bernheim for her well woman exam.    I encouraged her to continue exercising and eating a healthy diet. Once I have reviewed the lab work from her rheumatologist I will then order lab work to cover her thyroid, cholesterol and other conditions that might not have already been addressed. The patient voices understanding          [place], was evaluated through a synchronous (real-time) audio-video encounter. The patient (or guardian if applicable) is aware that this is a billable service. Verbal consent to proceed has been obtained within the past 12 months.  The visit was conducted pursuant to the emergency declaration under the [facility], [facility] authority and the [employer] General Act. Patient identification was verified, and a caregiver was present when appropriate. The patient was located in a state where the provider was credentialed to provide care. An electronic signature was used to authenticate this note.   -- Earnest Denton MD

## 2021-06-21 ENCOUNTER — TELEPHONE (OUTPATIENT)
Dept: PRIMARY CARE CLINIC | Age: 71
End: 2021-06-21

## 2021-06-21 DIAGNOSIS — E03.9 HYPOTHYROIDISM, UNSPECIFIED: ICD-10-CM

## 2021-06-21 NOTE — TELEPHONE ENCOUNTER
Patient called and stated she is out of her thyroid medication and would like a refill. Patient would like a call back.

## 2021-06-25 RX ORDER — LEVOTHYROXINE SODIUM 200 UG/1
TABLET ORAL
Qty: 90 TABLET | Refills: 1 | Status: SHIPPED | OUTPATIENT
Start: 2021-06-25 | End: 2022-01-14 | Stop reason: SDUPTHER

## 2021-06-28 DIAGNOSIS — F41.9 ANXIETY DISORDER, UNSPECIFIED: ICD-10-CM

## 2021-06-28 RX ORDER — PAROXETINE HYDROCHLORIDE 40 MG/1
40 TABLET, FILM COATED ORAL DAILY
Qty: 90 TABLET | Refills: 1 | Status: SHIPPED | OUTPATIENT
Start: 2021-06-28 | End: 2021-11-16 | Stop reason: SDUPTHER

## 2021-06-28 NOTE — TELEPHONE ENCOUNTER
Requested Prescriptions     Pending Prescriptions Disp Refills    PARoxetine (PAXIL) 40 mg tablet 90 Tablet 1

## 2021-07-21 DIAGNOSIS — F90.0 ATTENTION-DEFICIT HYPERACTIVITY DISORDER, PREDOMINANTLY INATTENTIVE TYPE: ICD-10-CM

## 2021-07-21 NOTE — TELEPHONE ENCOUNTER
Requested Prescriptions     Pending Prescriptions Disp Refills    dextroamphetamine-amphetamine (ADDERALL) 30 mg tablet 30 Tablet 0     Sig: Take 1 Tablet by mouth daily for 30 days. Max Daily Amount: 1 Tablet. Has scheduled appt 8/12. Gayla Jordan

## 2021-07-23 RX ORDER — DEXTROAMPHETAMINE SACCHARATE, AMPHETAMINE ASPARTATE, DEXTROAMPHETAMINE SULFATE AND AMPHETAMINE SULFATE 7.5; 7.5; 7.5; 7.5 MG/1; MG/1; MG/1; MG/1
30 TABLET ORAL DAILY
Qty: 30 TABLET | Refills: 0 | Status: SHIPPED | OUTPATIENT
Start: 2021-07-23 | End: 2021-08-22

## 2021-08-09 ENCOUNTER — TELEPHONE (OUTPATIENT)
Dept: PRIMARY CARE CLINIC | Age: 71
End: 2021-08-09

## 2021-08-09 NOTE — TELEPHONE ENCOUNTER
Patient has appointment on 08/12/21 and wants labs done before appointment. She is scheduled for labs on 08/10/21. Can labs be entered so she can get them taken.

## 2021-08-10 DIAGNOSIS — Z11.59 ENCOUNTER FOR HEPATITIS C SCREENING TEST FOR LOW RISK PATIENT: ICD-10-CM

## 2021-08-10 DIAGNOSIS — E78.2 MIXED HYPERLIPIDEMIA: ICD-10-CM

## 2021-08-10 DIAGNOSIS — I10 ESSENTIAL HYPERTENSION: ICD-10-CM

## 2021-08-10 DIAGNOSIS — I73.00 RAYNAUD'S SYNDROME WITHOUT GANGRENE: ICD-10-CM

## 2021-08-10 DIAGNOSIS — E55.9 VITAMIN D DEFICIENCY, UNSPECIFIED: ICD-10-CM

## 2021-08-10 DIAGNOSIS — E03.9 HYPOTHYROIDISM, UNSPECIFIED TYPE: Primary | ICD-10-CM

## 2021-08-13 ENCOUNTER — TELEPHONE (OUTPATIENT)
Dept: PRIMARY CARE CLINIC | Age: 71
End: 2021-08-13

## 2021-08-13 DIAGNOSIS — I73.00 RAYNAUD'S SYNDROME WITHOUT GANGRENE: Primary | ICD-10-CM

## 2021-08-13 RX ORDER — NIFEDIPINE 30 MG/1
30 TABLET, FILM COATED, EXTENDED RELEASE ORAL DAILY
Qty: 90 TABLET | Refills: 1 | Status: SHIPPED | OUTPATIENT
Start: 2021-08-13 | End: 2021-09-02 | Stop reason: SDUPTHER

## 2021-08-13 NOTE — TELEPHONE ENCOUNTER
Patient is requesting a refill on her nefidrine and she would like it sent to [facility] in [place], [place]. I would have done it under Refill Request but did not see this medication on her list.  She has a scheduled appointment for Tuesday, September 14.

## 2021-08-16 DIAGNOSIS — E55.9 VITAMIN D DEFICIENCY, UNSPECIFIED: ICD-10-CM

## 2021-08-16 RX ORDER — ERGOCALCIFEROL 1.25 MG/1
CAPSULE ORAL
Qty: 12 CAPSULE | Refills: 1 | Status: SHIPPED | OUTPATIENT
Start: 2021-08-16

## 2021-08-16 NOTE — TELEPHONE ENCOUNTER
Requested Prescriptions     Pending Prescriptions Disp Refills    ergocalciferol (ERGOCALCIFEROL) 1,250 mcg (50,000 unit) capsule 12 Capsule 1     Sig: TAKE 1 CAPSULE BY MOUTH ONCE A WEEK

## 2021-08-17 LAB
25(OH)D3+25(OH)D2 SERPL-MCNC: 40.9 NG/ML (ref 30–100)
ALBUMIN SERPL-MCNC: 4 G/DL (ref 3.7–4.7)
ALBUMIN/GLOB SERPL: 1.7 {RATIO} (ref 1.2–2.2)
ALP SERPL-CCNC: 58 IU/L (ref 48–121)
ALT SERPL-CCNC: 17 IU/L (ref 0–32)
APPEARANCE UR: CLEAR
AST SERPL-CCNC: 23 IU/L (ref 0–40)
BACTERIA #/AREA URNS HPF: NORMAL /[HPF]
BASOPHILS # BLD AUTO: 0.1 X10E3/UL (ref 0–0.2)
BASOPHILS NFR BLD AUTO: 1 %
BILIRUB SERPL-MCNC: 0.3 MG/DL (ref 0–1.2)
BILIRUB UR QL STRIP: NEGATIVE
BUN SERPL-MCNC: 13 MG/DL (ref 8–27)
BUN/CREAT SERPL: 14 (ref 12–28)
CALCIUM SERPL-MCNC: 9.4 MG/DL (ref 8.7–10.3)
CASTS URNS QL MICRO: NORMAL /LPF
CHLORIDE SERPL-SCNC: 102 MMOL/L (ref 96–106)
CHOLEST SERPL-MCNC: 302 MG/DL (ref 100–199)
CO2 SERPL-SCNC: 27 MMOL/L (ref 20–29)
COLOR UR: YELLOW
CREAT SERPL-MCNC: 0.95 MG/DL (ref 0.57–1)
EOSINOPHIL # BLD AUTO: 0.4 X10E3/UL (ref 0–0.4)
EOSINOPHIL NFR BLD AUTO: 5 %
EPI CELLS #/AREA URNS HPF: NORMAL /HPF (ref 0–10)
ERYTHROCYTE [DISTWIDTH] IN BLOOD BY AUTOMATED COUNT: 13 % (ref 11.7–15.4)
GLOBULIN SER CALC-MCNC: 2.4 G/DL (ref 1.5–4.5)
GLUCOSE SERPL-MCNC: 53 MG/DL (ref 65–99)
GLUCOSE UR QL: NEGATIVE
HCT VFR BLD AUTO: 41 % (ref 34–46.6)
HCV AB S/CO SERPL IA: <0.1 S/CO RATIO (ref 0–0.9)
HDLC SERPL-MCNC: 86 MG/DL
HGB BLD-MCNC: 13.6 G/DL (ref 11.1–15.9)
HGB UR QL STRIP: NEGATIVE
IMM GRANULOCYTES # BLD AUTO: 0.1 X10E3/UL (ref 0–0.1)
IMM GRANULOCYTES NFR BLD AUTO: 1 %
KETONES UR QL STRIP: NEGATIVE
LDLC SERPL CALC-MCNC: 192 MG/DL (ref 0–99)
LEUKOCYTE ESTERASE UR QL STRIP: ABNORMAL
LYMPHOCYTES # BLD AUTO: 1.1 X10E3/UL (ref 0.7–3.1)
LYMPHOCYTES NFR BLD AUTO: 14 %
MCH RBC QN AUTO: 30.5 PG (ref 26.6–33)
MCHC RBC AUTO-ENTMCNC: 33.2 G/DL (ref 31.5–35.7)
MCV RBC AUTO: 92 FL (ref 79–97)
MICRO URNS: ABNORMAL
MONOCYTES # BLD AUTO: 0.6 X10E3/UL (ref 0.1–0.9)
MONOCYTES NFR BLD AUTO: 8 %
NEUTROPHILS # BLD AUTO: 5.7 X10E3/UL (ref 1.4–7)
NEUTROPHILS NFR BLD AUTO: 71 %
NITRITE UR QL STRIP: NEGATIVE
PH UR STRIP: 6 [PH] (ref 5–7.5)
PLATELET # BLD AUTO: 336 X10E3/UL (ref 150–450)
POTASSIUM SERPL-SCNC: 4.4 MMOL/L (ref 3.5–5.2)
PROT SERPL-MCNC: 6.4 G/DL (ref 6–8.5)
PROT UR QL STRIP: NEGATIVE
RBC # BLD AUTO: 4.46 X10E6/UL (ref 3.77–5.28)
RBC #/AREA URNS HPF: NORMAL /HPF (ref 0–2)
SODIUM SERPL-SCNC: 139 MMOL/L (ref 134–144)
SP GR UR: 1.01 (ref 1–1.03)
T4 FREE SERPL-MCNC: 1.41 NG/DL (ref 0.82–1.77)
TRIGL SERPL-MCNC: 136 MG/DL (ref 0–149)
TSH SERPL DL<=0.005 MIU/L-ACNC: 4.17 UIU/ML (ref 0.45–4.5)
UROBILINOGEN UR STRIP-MCNC: 0.2 MG/DL (ref 0.2–1)
VLDLC SERPL CALC-MCNC: 24 MG/DL (ref 5–40)
WBC # BLD AUTO: 7.9 X10E3/UL (ref 3.4–10.8)
WBC #/AREA URNS HPF: NORMAL /HPF (ref 0–5)

## 2021-08-27 ENCOUNTER — TELEPHONE (OUTPATIENT)
Dept: PRIMARY CARE CLINIC | Age: 71
End: 2021-08-27

## 2021-08-27 DIAGNOSIS — F90.0 ATTENTION-DEFICIT HYPERACTIVITY DISORDER, PREDOMINANTLY INATTENTIVE TYPE: Primary | ICD-10-CM

## 2021-08-27 RX ORDER — DEXTROAMPHETAMINE SACCHARATE, AMPHETAMINE ASPARTATE, DEXTROAMPHETAMINE SULFATE AND AMPHETAMINE SULFATE 7.5; 7.5; 7.5; 7.5 MG/1; MG/1; MG/1; MG/1
30 TABLET ORAL DAILY
Qty: 31 TABLET | Refills: 0 | Status: SHIPPED | OUTPATIENT
Start: 2021-08-27 | End: 2021-09-27

## 2021-08-27 RX ORDER — DEXTROAMPHETAMINE SACCHARATE, AMPHETAMINE ASPARTATE MONOHYDRATE, DEXTROAMPHETAMINE SULFATE AND AMPHETAMINE SULFATE 7.5; 7.5; 7.5; 7.5 MG/1; MG/1; MG/1; MG/1
30 CAPSULE, EXTENDED RELEASE ORAL
Qty: 31 CAPSULE | Refills: 0 | Status: SHIPPED | OUTPATIENT
Start: 2021-10-30 | End: 2021-10-12 | Stop reason: SDUPTHER

## 2021-08-27 RX ORDER — DEXTROAMPHETAMINE SACCHARATE, AMPHETAMINE ASPARTATE, DEXTROAMPHETAMINE SULFATE AND AMPHETAMINE SULFATE 7.5; 7.5; 7.5; 7.5 MG/1; MG/1; MG/1; MG/1
30 TABLET ORAL DAILY
Qty: 31 TABLET | Refills: 0 | Status: SHIPPED | OUTPATIENT
Start: 2021-09-28 | End: 2021-10-29

## 2021-09-02 ENCOUNTER — OFFICE VISIT (OUTPATIENT)
Dept: PRIMARY CARE CLINIC | Age: 71
End: 2021-09-02
Payer: MEDICARE

## 2021-09-02 VITALS
OXYGEN SATURATION: 95 % | SYSTOLIC BLOOD PRESSURE: 127 MMHG | HEIGHT: 65 IN | HEART RATE: 90 BPM | TEMPERATURE: 97.9 F | DIASTOLIC BLOOD PRESSURE: 61 MMHG | RESPIRATION RATE: 20 BRPM | BODY MASS INDEX: 27.29 KG/M2

## 2021-09-02 DIAGNOSIS — F90.0 ATTENTION-DEFICIT HYPERACTIVITY DISORDER, PREDOMINANTLY INATTENTIVE TYPE: ICD-10-CM

## 2021-09-02 DIAGNOSIS — I73.00 RAYNAUD'S SYNDROME WITHOUT GANGRENE: ICD-10-CM

## 2021-09-02 DIAGNOSIS — N95.1 MENOPAUSE SYNDROME: ICD-10-CM

## 2021-09-02 DIAGNOSIS — Z12.31 ENCOUNTER FOR SCREENING MAMMOGRAM FOR MALIGNANT NEOPLASM OF BREAST: ICD-10-CM

## 2021-09-02 DIAGNOSIS — Z00.00 ENCOUNTER FOR ANNUAL WELLNESS VISIT (AWV) IN MEDICARE PATIENT: Primary | ICD-10-CM

## 2021-09-02 DIAGNOSIS — Z13.820 SCREENING FOR OSTEOPOROSIS: ICD-10-CM

## 2021-09-02 DIAGNOSIS — E28.39 OTHER PRIMARY OVARIAN FAILURE: ICD-10-CM

## 2021-09-02 DIAGNOSIS — Z12.11 SCREENING FOR COLON CANCER: ICD-10-CM

## 2021-09-02 PROCEDURE — 99214 OFFICE O/P EST MOD 30 MIN: CPT | Performed by: FAMILY MEDICINE

## 2021-09-02 PROCEDURE — 1101F PT FALLS ASSESS-DOCD LE1/YR: CPT | Performed by: FAMILY MEDICINE

## 2021-09-02 PROCEDURE — 1090F PRES/ABSN URINE INCON ASSESS: CPT | Performed by: FAMILY MEDICINE

## 2021-09-02 PROCEDURE — G8752 SYS BP LESS 140: HCPCS | Performed by: FAMILY MEDICINE

## 2021-09-02 PROCEDURE — G0439 PPPS, SUBSEQ VISIT: HCPCS | Performed by: FAMILY MEDICINE

## 2021-09-02 PROCEDURE — G8510 SCR DEP NEG, NO PLAN REQD: HCPCS | Performed by: FAMILY MEDICINE

## 2021-09-02 PROCEDURE — G8754 DIAS BP LESS 90: HCPCS | Performed by: FAMILY MEDICINE

## 2021-09-02 PROCEDURE — G8427 DOCREV CUR MEDS BY ELIG CLIN: HCPCS | Performed by: FAMILY MEDICINE

## 2021-09-02 PROCEDURE — G9899 SCRN MAM PERF RSLTS DOC: HCPCS | Performed by: FAMILY MEDICINE

## 2021-09-02 PROCEDURE — G8536 NO DOC ELDER MAL SCRN: HCPCS | Performed by: FAMILY MEDICINE

## 2021-09-02 PROCEDURE — 3017F COLORECTAL CA SCREEN DOC REV: CPT | Performed by: FAMILY MEDICINE

## 2021-09-02 PROCEDURE — G8419 CALC BMI OUT NRM PARAM NOF/U: HCPCS | Performed by: FAMILY MEDICINE

## 2021-09-02 PROCEDURE — G8400 PT W/DXA NO RESULTS DOC: HCPCS | Performed by: FAMILY MEDICINE

## 2021-09-02 RX ORDER — NIFEDIPINE 30 MG/1
30 TABLET, FILM COATED, EXTENDED RELEASE ORAL DAILY
Qty: 90 TABLET | Refills: 1 | Status: SHIPPED | OUTPATIENT
Start: 2021-09-02 | End: 2022-05-29

## 2021-09-02 RX ORDER — DEXTROAMPHETAMINE SACCHARATE, AMPHETAMINE ASPARTATE MONOHYDRATE, DEXTROAMPHETAMINE SULFATE AND AMPHETAMINE SULFATE 7.5; 7.5; 7.5; 7.5 MG/1; MG/1; MG/1; MG/1
30 CAPSULE, EXTENDED RELEASE ORAL
Qty: 31 CAPSULE | Refills: 0 | Status: CANCELLED | OUTPATIENT
Start: 2021-10-30 | End: 2021-11-30

## 2021-09-02 NOTE — PROGRESS NOTES
Chief Complaint   Patient presents with    Hypertension    Hypothyroidism    Medication Refill    Cholesterol Problem    Annual Wellness Visit     Medicare Wellness (Subsequent). 1. Have you been to the ER, urgent care clinic since your last visit? Hospitalized since your last visit? No    2. Have you seen or consulted any other health care providers outside of the [address] [place] [address] since your last visit? Include any pap smears or colon screening. No     Patient states she does not want to have a colonoscopy but would consider a cologuard test.    She does need a mammogram.    Does need a bone density test.    This is the Subsequent Medicare Annual Wellness Exam, performed 12 months or more after the Initial AWV or the last Subsequent AWV    I have reviewed the patient's medical history in detail and updated the computerized patient record. Assessment/Plan   Education and counseling provided:  Are appropriate based on today's review and evaluation  End-of-Life planning (with patient's consent)    1. Encounter for annual wellness visit (AWV) in Medicare patient  2. Raynaud's syndrome without gangrene  -     NIFEdipine ER (ADALAT CC) 30 mg ER tablet; Take 1 Tablet by mouth daily. Indications: Raynaud's phenomenon, a condition where blood vessels constrict too much with coldness or stress, Normal, Disp-90 Tablet, R-1Patient may be willing to pay out of pocket. 3. Attention-deficit hyperactivity disorder, predominantly inattentive type  4. Encounter for screening mammogram for malignant neoplasm of breast  -     DIANA MAMMO BI SCREENING INCL CAD; Future  5. Screening for osteoporosis  -     DEXA BONE DENSITY STUDY AXIAL; Future  6. Screening for colon cancer  -     COLOGUARD TEST (FECAL DNA COLORECTAL CANCER SCREENING)  7. Menopause syndrome  -     DEXA BONE DENSITY STUDY AXIAL; Future  8. Other primary ovarian failure   -     DEXA BONE DENSITY STUDY AXIAL;  Future       Depression Risk Factor Screening     3 most recent PHQ Screens 9/2/2021   Little interest or pleasure in doing things Not at all   Feeling down, depressed, irritable, or hopeless Not at all   Total Score PHQ 2 0       Alcohol Risk Screen    Do you average more than 1 drink per night or more than 7 drinks a week:  No    On any one occasion in the past three months have you have had more than 3 drinks containing alcohol:  No        Functional Ability and Level of Safety    Hearing: Hearing is good. Activities of Daily Living: The home contains: handrails and grab bars  Patient does total self care      Ambulation: with no difficulty     Fall Risk:  Fall Risk Assessment, last 12 mths 9/2/2021   Able to walk? Yes   Fall in past 12 months? 0   Do you feel unsteady?  0   Are you worried about falling 0      Abuse Screen:  Patient is not abused       Cognitive Screening    Has your family/caregiver stated any concerns about your memory: no     Cognitive Screening: Normal - Mini Cog Test    Health Maintenance Due     Health Maintenance Due   Topic Date Due    COVID-19 Vaccine (1) Never done    DTaP/Tdap/Td series (1 - Tdap) Never done    Colorectal Cancer Screening Combo  Never done    Breast Cancer Screen Mammogram  Never done    Bone Densitometry (Dexa) Screening  Never done    Flu Vaccine (1) 09/01/2021       Patient Care Team   Patient Care Team:  Bee Fatima MD as PCP - General (Family Medicine)  Bee Fatima MD as PCP - [address] [place] [address] [facility]  Haseeb Calvin MD as Consulting Provider (Obstetrics & Gynecology)  Zahra Penny MD as Consulting Provider (Rheumatology)    History     Patient Active Problem List   Diagnosis Code    Essential hypertension I10    Hyperlipidemia E78.5    OA (osteoarthritis) of knee M17.10    Varicose veins of lower extremity I83.90    Anxiety disorder F41.9    Attention-deficit hyperactivity disorder, predominantly inattentive type F90.0    Acquired hypothyroidism E03.9    Peptic ulcer K27.9    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     Past Medical History:   Diagnosis Date    Acquired hypothyroidism 8/6/2020    Attention-deficit hyperactivity disorder, predominantly inattentive type 8/6/2020    Essential hypertension 8/6/2020    Hyperlipidemia 8/6/2020    OA (osteoarthritis) of knee 6/6/2014    Peptic ulcer 8/6/2020    Varicose veins of lower extremity 8/6/2020      Past Surgical History:   Procedure Laterality Date    HX COLONOSCOPY  07/13/2006    HX KNEE REPLACEMENT Right 01/2007    HX ORTHOPAEDIC      Right knee     Current Outpatient Medications   Medication Sig Dispense Refill    NIFEdipine ER (ADALAT CC) 30 mg ER tablet Take 1 Tablet by mouth daily. Indications: Raynaud's phenomenon, a condition where blood vessels constrict too much with coldness or stress 90 Tablet 1    dextroamphetamine-amphetamine (ADDERALL) 30 mg tablet Take 1 Tablet by mouth daily for 31 days. Max Daily Amount: 1 Tablet. 31 Tablet 0    [START ON 9/28/2021] dextroamphetamine-amphetamine (ADDERALL) 30 mg tablet Take 1 Tablet by mouth daily for 31 days. Max Daily Amount: 1 Tablet. 31 Tablet 0    [START ON 10/30/2021] amphetamine-dextroamphetamine XR (ADDERALL XR) 30 mg XR capsule Take 1 Capsule by mouth every morning for 31 days. Max Daily Amount: 30 mg. 31 Capsule 0    ergocalciferol (ERGOCALCIFEROL) 1,250 mcg (50,000 unit) capsule TAKE 1 CAPSULE BY MOUTH ONCE A WEEK 12 Capsule 1    PARoxetine (PAXIL) 40 mg tablet Take 1 Tablet by mouth daily.  90 Tablet 1    levothyroxine (SYNTHROID) 200 mcg tablet TAKE 1 TABLET BY MOUTH EVERY DAY 90 Tablet 1    azaTHIOprine (IMURAN) 50 mg tablet TAKE 1 TABLET BY MOUTH TWICE DAILY      predniSONE (DELTASONE) 10 mg tablet TAKE 3 TABLETS BY MOUTH EVERY DAY      solifenacin (VESIcare) 10 mg tablet Vesicare 10 mg tablet   TAKE 1 TABLET BY MOUTH EVERY DAY      alendronate (FOSAMAX) 70 mg tablet TAKE 1 TABLET BY MOUTH WEEKLY 30 MINUTES BEFORE THE FIRST FOOD OR BEVERAGE OR MEDICINE OF THE DAY WITH WATER (Patient not taking: Reported on 2021)      varicella-zoster recombinant, PF, (Shingrix, PF,) 50 mcg/0.5 mL susr injection Shingrix (PF) 50 mcg/0.5 mL intramuscular suspension, kit   PHARMACIST ADMINISTERED IMMUNIZATION ADMINISTERED AT TIME OF DISPENSING       Allergies   Allergen Reactions    Codeine Rash       Family History   Family history unknown: Yes     Social History     Tobacco Use    Smoking status: Former Smoker    Smokeless tobacco: Never Used   Substance Use Topics    Alcohol use: Yes     Comment: Occasionally     Waldo Mercado (: 1950) is a 66 y.o. female, established patient, here for evaluation of the following chief complaint(s):  Hypertension, Hypothyroidism, Medication Refill, Cholesterol Problem, and Annual Wellness Visit [facility] (Subsequent). )       ASSESSMENT/PLAN:  Below is the assessment and plan developed based on review of pertinent history, physical exam, labs, studies, and medications. 1. Encounter for annual wellness visit (AWV) in Medicare patient  2. Raynaud's syndrome without gangrene  -     NIFEdipine ER (ADALAT CC) 30 mg ER tablet; Take 1 Tablet by mouth daily. Indications: Raynaud's phenomenon, a condition where blood vessels constrict too much with coldness or stress, Normal, Disp-90 Tablet, R-1Patient may be willing to pay out of pocket. 3. Attention-deficit hyperactivity disorder, predominantly inattentive type  4. Encounter for screening mammogram for malignant neoplasm of breast  -     DIANA MAMMO BI SCREENING INCL CAD; Future  5. Screening for osteoporosis  -     DEXA BONE DENSITY STUDY AXIAL; Future  6. Screening for colon cancer  -     COLOGUARD TEST (FECAL DNA COLORECTAL CANCER SCREENING)  7. Menopause syndrome  -     DEXA BONE DENSITY STUDY AXIAL; Future  8. Other primary ovarian failure   -     DEXA BONE DENSITY STUDY AXIAL;  Future      Return in about 6 months (around 3/2/2022) for Follow up of chronic medical conditions, Fasting Lab Appointment. SUBJECTIVE/OBJECTIVE:  Patient comes in for a annual Medicare wellness visit as well as follow-up for her Raynaud's phenomenon, ADHD, hypertension, hypothyroid disease, and history of osteoarthritis of the knee. Review of Systems   Constitutional: Negative. Respiratory: Negative. Cardiovascular: Negative. Gastrointestinal: Negative. Endocrine: Negative. Genitourinary: Negative. Musculoskeletal: Positive for arthralgias (Knee). Allergic/Immunologic: Negative. Neurological: Positive for numbness (bottom of feet). Hematological: Negative. Psychiatric/Behavioral: Negative. Physical Exam  Vitals and nursing note reviewed. Constitutional:       Appearance: Normal appearance. She is normal weight. HENT:      Head: Normocephalic and atraumatic. Cardiovascular:      Rate and Rhythm: Normal rate and regular rhythm. Heart sounds: Normal heart sounds. Pulmonary:      Effort: Pulmonary effort is normal.      Breath sounds: Normal breath sounds. Abdominal:      General: Abdomen is flat. Bowel sounds are normal.      Palpations: Abdomen is soft. Musculoskeletal:         General: Normal range of motion. Neurological:      General: No focal deficit present. Mental Status: She is alert. Psychiatric:         Mood and Affect: Mood normal.         Behavior: Behavior normal.         Thought Content: Thought content normal.         Judgment: Judgment normal.     Overall this patient is doing well and taking her medications as recommended. Access to the  was made and she is taking her medications appropriately. There is no sign of abuse. We will set up her bone density, mammogram and Cologuard test for colon cancer. She understands if the Cologuard is positive she may need a colonoscopy after all. 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           An electronic signature was used to authenticate this note.   -- MD Herlinda Riggs MD

## 2021-09-02 NOTE — PATIENT INSTRUCTIONS
Medicare Wellness Visit, Female    The best way to improve and maintain good health is to have a healthy lifestyle by eating a well-balanced diet, exercising regularly, limiting alcohol and stopping smoking. Regular visits with your physician or non-physician health care provider also support your good health. Preventive screening tests can find health problems before they become diseases or illnesses. Here is a list of your current Health Maintenance items with a due date:  Health Maintenance   Topic Date Due    COVID-19 Vaccine (1) Never done    DTaP/Tdap/Td series (1 - Tdap) Never done    Colorectal Cancer Screening Combo  Never done    Breast Cancer Screen Mammogram  Never done    Bone Densitometry (Dexa) Screening  Never done    Medicare Yearly Exam  Never done    Flu Vaccine (1) 09/01/2021    Lipid Screen  08/16/2026    Hepatitis C Screening  Completed    Shingrix Vaccine Age 50>  Completed    Pneumococcal 65+ years  Completed       Preventive services such as immunizations prevent serious infections. All people over age 72 should have a Pneumovax and a Prevnar-13 shot to prevent potentially life threatening infections with the pneumococcus bacteria, a common cause of pneumonia. These are once in a lifetime unless you and your provider decide differently. All people over 65 should have a yearly influenza vaccine or \"flu\" shot. This does not prevent infection with cold viruses but has been proven to prevent hospitalization and death from influenza. Although Medicare part B \"regular Medicare\" currently only covers tetanus vaccination in the context of an injury, a tetanus vaccine (Tdap or Td) is recommended every 10 years. A new 2 shot shingles vaccine series (Shingrix) is recommended after age 48 even for people who have already received Zostavax (the old vaccine). It is also not covered by Medicare part B.     Note, however, that both the Shingles vaccine and Tdap/Td are generally covered by secondary carriers. Please check your coverage and out of pocket expenses. Consider contacting your local health department because it may stock these vaccines for a reasonable charge. We currently have documentation of the following immunization history for you:  Immunization History   Administered Date(s) Administered    Influenza Vaccine 11/01/2016, 11/26/2019    Pneumococcal Vaccine (Unspecified Type) 05/01/2016    Zoster Recombinant 12/11/2019, 02/26/2020           Screening for infection with Hepatitis C is recommended for anyone born between [birth year] through 1965. The table at the top of this document indicates the status of this and other preventive services. A bone mass density test (DEXA) to screen for osteoporosis or thinning of the bones should be done at least once after age 72 and may be done up to every 2 years as determined by you and your health care provider. The most recent DEXA we have on file for you is:  DEXA Results (most recent):  No results found for this or any previous visit. Screening for diabetes mellitus with a blood sugar test (glucose) should be done at least every 3 years until age 79. You and your health care provider may decide whether to continue screening after age 79. The most recent blood glucose we have on file for you is:   Lab Results   Component Value Date/Time    Glucose 53 (L) 08/16/2021 08:47 AM       Fasting sugars >126 on 2 separate occassions are consistent with diabetes. Random sugars >200 on 2 separate occassions are consistent with diabetes    Glaucoma is a disease of the eye due to increased ocular pressure that can lead to blindness.  People with risk factors for glaucoma ( race, diabetes, family history) should consider screening at least every 2 years by an eye professional.     Cardiovascular screening tests that check for elevated lipids or cholesterol (fatty part of blood) which can lead to heart disease and strokes should be done every 4-6 years through age 79. You and your health care provider may decide whether to continue screening after age 79. The most recent lipid panel we have on file for you is:   Lab Results   Component Value Date/Time    Cholesterol, total 302 (H) 08/16/2021 08:47 AM    HDL Cholesterol 86 08/16/2021 08:47 AM    LDL, calculated 192 (H) 08/16/2021 08:47 AM    LDL, calculated 133 (H) 08/20/2020 09:12 AM    VLDL, calculated 24 08/16/2021 08:47 AM    VLDL, calculated 20 08/20/2020 09:12 AM    Triglyceride 136 08/16/2021 08:47 AM       Colorectal cancer screening that evaluates for blood or polyps in your colon for people with average risk should be done yearly as a stool test, every five years as a flexible sigmoidoscope or every 10 years as a colonoscopy up to age 76. You and your health care provider may decide whether to continue screening after age 76. Breast cancer screening with a mammogram is recommended at least once every 2 years  for women age 54-69. You and your health care provider may decide whether to continue screening after age 76. The most recent mammogram we have on file for you is:   DIANA Results (most recent):  No results found for this or any previous visit. Screening for cervical cancer with a pap smear is recommended for all women with a cervix until age 72. The frequency of this test is based on the details of her prior pap smear testing. You and your health care provider may decide whether to continue screening after age 72. Your Medicare Wellness Exam is recommended annually.

## 2021-09-19 ENCOUNTER — ANESTHESIA EVENT (OUTPATIENT)
Dept: SURGERY | Age: 71
End: 2021-09-19

## 2021-09-19 RX ORDER — INSULIN LISPRO 100 [IU]/ML
INJECTION, SOLUTION INTRAVENOUS; SUBCUTANEOUS ONCE
Status: CANCELLED | OUTPATIENT
Start: 2021-09-19 | End: 2021-09-20

## 2021-09-19 RX ORDER — SODIUM CHLORIDE 0.9 % (FLUSH) 0.9 %
5-40 SYRINGE (ML) INJECTION EVERY 8 HOURS
Status: CANCELLED | OUTPATIENT
Start: 2021-09-19

## 2021-09-22 ENCOUNTER — ANESTHESIA (OUTPATIENT)
Dept: SURGERY | Age: 71
End: 2021-09-22
Payer: MEDICARE

## 2021-09-22 ENCOUNTER — HOSPITAL ENCOUNTER (OUTPATIENT)
Age: 71
Setting detail: OUTPATIENT SURGERY
Discharge: HOME | End: 2021-09-22
Attending: OPHTHALMOLOGY | Admitting: OPHTHALMOLOGY
Payer: MEDICARE

## 2021-09-22 VITALS
BODY MASS INDEX: 25.33 KG/M2 | HEIGHT: 65 IN | RESPIRATION RATE: 16 BRPM | WEIGHT: 152 LBS | DIASTOLIC BLOOD PRESSURE: 78 MMHG | HEART RATE: 79 BPM | SYSTOLIC BLOOD PRESSURE: 139 MMHG | TEMPERATURE: 97.5 F | OXYGEN SATURATION: 100 %

## 2021-09-22 PROCEDURE — V2632 POST CHMBR INTRAOCULAR LENS: HCPCS | Performed by: OPHTHALMOLOGY

## 2021-09-22 PROCEDURE — 74011000250 HC RX REV CODE- 250: Performed by: OPHTHALMOLOGY

## 2021-09-22 PROCEDURE — 76210000020 HC REC RM PH II FIRST 0.5 HR: Performed by: OPHTHALMOLOGY

## 2021-09-22 PROCEDURE — 74011250636 HC RX REV CODE- 250/636: Performed by: NURSE ANESTHETIST, CERTIFIED REGISTERED

## 2021-09-22 PROCEDURE — 76060000031 HC ANESTHESIA FIRST 0.5 HR: Performed by: OPHTHALMOLOGY

## 2021-09-22 PROCEDURE — 74011250636 HC RX REV CODE- 250/636: Performed by: OPHTHALMOLOGY

## 2021-09-22 PROCEDURE — 76010000154 HC OR TIME FIRST 0.5 HR: Performed by: OPHTHALMOLOGY

## 2021-09-22 PROCEDURE — 2709999900 HC NON-CHARGEABLE SUPPLY: Performed by: OPHTHALMOLOGY

## 2021-09-22 DEVICE — LENS ACRYL ASPHEROC 1PC 20.5D -- TECNIS ZCB00: Type: IMPLANTABLE DEVICE | Site: EYE | Status: FUNCTIONAL

## 2021-09-22 RX ORDER — LIDOCAINE HYDROCHLORIDE 10 MG/ML
INJECTION, SOLUTION EPIDURAL; INFILTRATION; INTRACAUDAL; PERINEURAL AS NEEDED
Status: DISCONTINUED | OUTPATIENT
Start: 2021-09-22 | End: 2021-09-22 | Stop reason: HOSPADM

## 2021-09-22 RX ORDER — TETRACAINE HYDROCHLORIDE 5 MG/ML
SOLUTION OPHTHALMIC AS NEEDED
Status: DISCONTINUED | OUTPATIENT
Start: 2021-09-22 | End: 2021-09-22 | Stop reason: HOSPADM

## 2021-09-22 RX ORDER — MOXIFLOXACIN 5 MG/ML
SOLUTION/ DROPS OPHTHALMIC AS NEEDED
Status: DISCONTINUED | OUTPATIENT
Start: 2021-09-22 | End: 2021-09-22 | Stop reason: HOSPADM

## 2021-09-22 RX ORDER — SODIUM CHLORIDE, SODIUM LACTATE, POTASSIUM CHLORIDE, CALCIUM CHLORIDE 600; 310; 30; 20 MG/100ML; MG/100ML; MG/100ML; MG/100ML
25 INJECTION, SOLUTION INTRAVENOUS CONTINUOUS
Status: DISCONTINUED | OUTPATIENT
Start: 2021-09-22 | End: 2021-09-22 | Stop reason: HOSPADM

## 2021-09-22 RX ORDER — SODIUM CHLORIDE 0.9 % (FLUSH) 0.9 %
5-40 SYRINGE (ML) INJECTION AS NEEDED
Status: DISCONTINUED | OUTPATIENT
Start: 2021-09-22 | End: 2021-09-22 | Stop reason: HOSPADM

## 2021-09-22 RX ORDER — MIDAZOLAM HYDROCHLORIDE 1 MG/ML
INJECTION, SOLUTION INTRAMUSCULAR; INTRAVENOUS AS NEEDED
Status: DISCONTINUED | OUTPATIENT
Start: 2021-09-22 | End: 2021-09-22 | Stop reason: HOSPADM

## 2021-09-22 RX ORDER — EPINEPHRINE 1 MG/ML
INJECTION, SOLUTION, CONCENTRATE INTRAVENOUS AS NEEDED
Status: DISCONTINUED | OUTPATIENT
Start: 2021-09-22 | End: 2021-09-22 | Stop reason: HOSPADM

## 2021-09-22 RX ADMIN — TROPICAMIDE: 10 SOLUTION/ DROPS OPHTHALMIC at 07:45

## 2021-09-22 RX ADMIN — Medication 3 ML: at 09:26

## 2021-09-22 RX ADMIN — MIDAZOLAM 2 MG: 1 INJECTION INTRAMUSCULAR; INTRAVENOUS at 09:26

## 2021-09-22 NOTE — ANESTHESIA POSTPROCEDURE EVALUATION
Procedure(s):  PHACO WITH IOL OS. MAC    Anesthesia Post Evaluation      Multimodal analgesia: multimodal analgesia used between 6 hours prior to anesthesia start to PACU discharge  Patient location during evaluation: bedside  Patient participation: complete - patient participated  Level of consciousness: awake and responsive to physical stimuli  Pain management: satisfactory to patient  Airway patency: patent  Anesthetic complications: no  Cardiovascular status: acceptable  Respiratory status: acceptable  Hydration status: acceptable  Comments: Patient is awake and comfortable post operatively. Report to RN at bedside. Post anesthesia nausea and vomiting:  none  Final Post Anesthesia Temperature Assessment:  Normothermia (36.0-37.5 degrees C)      INITIAL Post-op Vital signs: No vitals data found for the desired time range.

## 2021-09-22 NOTE — DISCHARGE INSTRUCTIONS
POST-OPERATIVE INSTRUCTIONS FOR CATARACT SURGERY     1. No heavy lifting (no more than 5 pounds). No Bending at waist and No strenuous activity for one (1) week. 2. DO NOT RUB YOUR EYE!!! Wear eye protection at all times when going outdoors (glasses, sunglasses,        ect) and wear shield while sleeping/napping for the first week after surgery. 3. DO NOT GET WATER IN YOUR EYES FOR THE NEXT 3 DAYS. You may gently clean your face and you        may shower, but don't put any pressure on the eye. Ladies, no eye makeup for one (1) week after surgery. Do not swim or get into a hot tub for three (3) weeks. 4. You may experience eye irritation, aching, itching and blurred vision for several days. Use Tylenol for         Discomfort but DO NOT RUB YOUR EYE . 5. Call your doctor with any increased pain, redness, nausea, vomiting, or worsening vision. Also call your doctor        with new flashes of light, many new floaters or a curtain/veil coming into your vision.                                                8 Rebeca Casanovaidi YOUR HANDS BEFORE PUTTING IN YOUR DROPS                                       ALLOW 5 MINUTES BETWEEN DIFFERENT DROPS                       IF YOU HAVE ANY QUESTION OR CONCERNS DURING OFFICE HOUR                      CALL [phone] OR [phone] AFTER HOURS OR ON WEEKENDS

## 2021-09-22 NOTE — H&P
SEE original H&P Scanned into chart. Date of Surgery Update:  Armando Reilly was seen and examined. History and physical has been reviewed. The patient has been examined.  There have been no significant clinical changes since the completion of the originally dated History and Physical.     Signed By: Tiffany Eldridge MD   09/22/21, 7:57 AM

## 2021-09-22 NOTE — OP NOTES
OPERATIVE REPORT     PATIENT INFORMATION:    Patient: Apoorva Chopra MRN: [id number]  SSN: [id number]    YOB: 1950  Age: 66 y.o. Sex: female        LOCATION OF SURGERY: [address] SURGERY:  09/22/21      PRE-OPERATIVE DIAGNOSIS: Cataract Left eye. POST OPERATIVE DIAGNOSIS: Cataract Left eye. PROCEDURE PERFORMED: Phacoemulsification with intraocular lens Left eye. SURGEON: Jemma Squires M.D. ANESTHESIA: Monitored anesthesia. COMPLICATIONS: None. INDICATIONS FOR PROCEDURE:   This is a 66y.o. year old female with progressively decreasing vision in the left eye. Risks, benefits and alternatives of surgery were explained at length with the patient and informed consent was obtained. PROCEDURE:   The patient was met in the pre-operative holding area where confirmation was made that the left eye was to be operated on and surgical eye was marked. The patient was taken to the operating room where anesthesia staff was present to give temporary sedation. Following the instillation of topical tetracaine drops to the operative eye the patient was then prepped and draped in the usual sterile fashion for ophthalmic surgery. The lid speculum was placed and the lids were retracted. A paracentesis was made through the clear cornea, shugarcaine was injected in the eye for improved dilation. The anterior chamber was deepened with viscoelastic material. A 2.4 mm keratome was used to make a self-sealing clear corneal incision. Capsulorrhexis was initiated with a cystotome and completed with Utrata forceps without difficulty. Hydrodissection was completed and good fluid wave was visualized. Phacoemulsification was initiated and completed in a divide and conquer fashion without difficulty.  Irrigation and aspiration was used to remove the residual cortical material from the capsular bag and then the capsular bag and the anterior chamber were deepened with viscoelastic material. The bag was inspected and found to be free of any tears or defects. A DCB00 20.5 Diopter intraocular lens was placed into the capsular bag with good centration. Irrigation and aspiration was used to remove the residual viscoelastic material from the capsular bag and the anterior chamber and the incision sites were hydrated the BSS and cannula. The incisions were rehydrated and inspected with a weck-kathrin sponge and were found to be water tight. The eye was inspected and the anterior chamber was deep, the pupil round, and the lens was in good position. The lid speculum was then removed under visualization. Several drops of antibiotic eye drops were instilled into patient's operated eye. The operated eye was then covered with an eyeshield. The patient tolerated the procedure well and was transferred to recovery in good condition.      Xander Galeas MD  68/42/7082:15 AM

## 2021-09-22 NOTE — ANESTHESIA PREPROCEDURE EVALUATION
Relevant Problems   NEUROLOGY   (+) Anxiety disorder   (+) Attention-deficit hyperactivity disorder, predominantly inattentive type      CARDIOVASCULAR   (+) Essential hypertension      GASTROINTESTINAL   (+) Peptic ulcer      ENDOCRINE   (+) Acquired hypothyroidism       Anesthetic History   No history of anesthetic complications            Review of Systems / Medical History  Patient summary reviewed, nursing notes reviewed and pertinent labs reviewed    Pulmonary  Within defined limits                 Neuro/Psych   Within defined limits           Cardiovascular    Hypertension              Exercise tolerance: >4 METS     GI/Hepatic/Renal           PUD     Endo/Other      Hypothyroidism  Arthritis     Other Findings              Physical Exam    Airway  Mallampati: II  TM Distance: 4 - 6 cm  Neck ROM: normal range of motion   Mouth opening: Normal     Cardiovascular  Regular rate and rhythm,  S1 and S2 normal,  no murmur, click, rub, or gallop  Rhythm: regular  Rate: normal         Dental  No notable dental hx       Pulmonary  Breath sounds clear to auscultation               Abdominal  GI exam deferred       Other Findings            Anesthetic Plan    ASA: 2  Anesthesia type: MAC          Induction: Intravenous  Anesthetic plan and risks discussed with: Patient

## 2021-09-22 NOTE — ADDENDUM NOTE
Addendum  created 09/22/21 0950 by Nahomi Benitez CRNA    Visit Navigator SmartForm Flowsheet section accepted

## 2021-10-12 DIAGNOSIS — F90.0 ATTENTION-DEFICIT HYPERACTIVITY DISORDER, PREDOMINANTLY INATTENTIVE TYPE: ICD-10-CM

## 2021-10-12 NOTE — TELEPHONE ENCOUNTER
Requested Prescriptions     Pending Prescriptions Disp Refills    amphetamine-dextroamphetamine XR (ADDERALL XR) 30 mg XR capsule 31 Capsule 0     Sig: Take 1 Capsule by mouth every morning for 31 days. Max Daily Amount: 30 mg.

## 2021-10-13 RX ORDER — DEXTROAMPHETAMINE SACCHARATE, AMPHETAMINE ASPARTATE MONOHYDRATE, DEXTROAMPHETAMINE SULFATE AND AMPHETAMINE SULFATE 7.5; 7.5; 7.5; 7.5 MG/1; MG/1; MG/1; MG/1
30 CAPSULE, EXTENDED RELEASE ORAL
Qty: 31 CAPSULE | Refills: 0 | Status: SHIPPED | OUTPATIENT
Start: 2021-10-30 | End: 2021-11-30

## 2021-10-18 ENCOUNTER — TELEPHONE (OUTPATIENT)
Dept: PRIMARY CARE CLINIC | Age: 71
End: 2021-10-18

## 2021-10-18 NOTE — TELEPHONE ENCOUNTER
Pt is asking for a expedited refill but you have to call the pharmacy so they can release her adderall early. Please read message below          ----- Message from Josephine Seth sent at 10/18/2021  1:23 PM EDT -----  Subject: Medication Problem    QUESTIONS  Name of Medication? dextroamphetamine-amphetamine (ADDERALL) 30 mg tablet  Patient-reported dosage and instructions? 30 MG once a day  What question or problem do you have with the medication? Patient is   currently out of medication, refills won't be filled until 10/30 but needs   Dr Arabella Robbins to tell pharmacy to release them today if possible. Preferred Pharmacy? [facility] [address] phone number (if available)? [phone]  Additional Information for Provider?   ---------------------------------------------------------------------------  --------------  [address]  What is the best way for the office to contact you? OK to leave message on   voicemail  Preferred Call Back Phone Number? [phone]  ---------------------------------------------------------------------------  --------------  SCRIPT ANSWERS  Relationship to Patient?  Self

## 2021-10-18 NOTE — TELEPHONE ENCOUNTER
Why did she run out. There should have been enough until the 30th. Or the pharmacy should be able to release it earlier.

## 2021-10-18 NOTE — TELEPHONE ENCOUNTER
Spoke with Pharmacist at [facility]. She stated the last Adderall that the patient picked up was 09/01/2021 and it was for the plain Adderall. She has a RX for Adderall ER stating not to fill until 10/30/2021.

## 2021-10-19 NOTE — TELEPHONE ENCOUNTER
I left a voicemail for them to refill this early. I left my cell phone number for the pharmacist so either they will just release it or he may call me back.   They were on lunch break until 2 PM

## 2021-11-16 DIAGNOSIS — F41.9 ANXIETY DISORDER, UNSPECIFIED: ICD-10-CM

## 2021-11-19 RX ORDER — PAROXETINE HYDROCHLORIDE 40 MG/1
40 TABLET, FILM COATED ORAL DAILY
Qty: 90 TABLET | Refills: 1 | Status: SHIPPED | OUTPATIENT
Start: 2021-11-19

## 2022-01-14 DIAGNOSIS — E03.9 HYPOTHYROIDISM, UNSPECIFIED: ICD-10-CM

## 2022-01-14 RX ORDER — LEVOTHYROXINE SODIUM 200 UG/1
TABLET ORAL
Qty: 90 TABLET | Refills: 0 | Status: SHIPPED | OUTPATIENT
Start: 2022-01-14

## 2022-03-18 PROBLEM — E03.9 ACQUIRED HYPOTHYROIDISM: Status: ACTIVE | Noted: 2020-08-06

## 2022-03-19 PROBLEM — K27.9 PEPTIC ULCER: Status: ACTIVE | Noted: 2020-08-06

## 2022-03-19 PROBLEM — F90.0 ATTENTION-DEFICIT HYPERACTIVITY DISORDER, PREDOMINANTLY INATTENTIVE TYPE: Status: ACTIVE | Noted: 2020-08-06

## 2022-03-19 PROBLEM — I83.90 VARICOSE VEINS OF LOWER EXTREMITY: Status: ACTIVE | Noted: 2020-08-06

## 2022-03-19 PROBLEM — I77.6: Status: ACTIVE | Noted: 2021-04-19

## 2022-03-19 PROBLEM — I10 ESSENTIAL HYPERTENSION: Status: ACTIVE | Noted: 2020-08-06

## 2022-03-19 PROBLEM — E78.5 HYPERLIPIDEMIA: Status: ACTIVE | Noted: 2020-08-06

## 2022-03-19 PROBLEM — F41.9 ANXIETY DISORDER: Status: ACTIVE | Noted: 2020-08-06

## 2022-05-29 ENCOUNTER — HOSPITAL ENCOUNTER (EMERGENCY)
Age: 72
Discharge: HOME | End: 2022-05-29
Attending: EMERGENCY MEDICINE
Payer: MEDICARE

## 2022-05-29 ENCOUNTER — APPOINTMENT (OUTPATIENT)
Dept: GENERAL RADIOLOGY | Age: 72
End: 2022-05-29
Attending: EMERGENCY MEDICINE
Payer: MEDICARE

## 2022-05-29 VITALS
TEMPERATURE: 98.2 F | HEIGHT: 65 IN | WEIGHT: 169 LBS | SYSTOLIC BLOOD PRESSURE: 139 MMHG | BODY MASS INDEX: 28.16 KG/M2 | OXYGEN SATURATION: 97 % | RESPIRATION RATE: 16 BRPM | DIASTOLIC BLOOD PRESSURE: 81 MMHG | HEART RATE: 89 BPM

## 2022-05-29 DIAGNOSIS — S80.10XA CONTUSION OF MULTIPLE SITES OF LOWER EXTREMITY, UNSPECIFIED LATERALITY, INITIAL ENCOUNTER: Primary | ICD-10-CM

## 2022-05-29 DIAGNOSIS — S81.811A LACERATION OF RIGHT LOWER LEG, INITIAL ENCOUNTER: ICD-10-CM

## 2022-05-29 PROCEDURE — 74011250637 HC RX REV CODE- 250/637: Performed by: EMERGENCY MEDICINE

## 2022-05-29 PROCEDURE — 74011250636 HC RX REV CODE- 250/636: Performed by: EMERGENCY MEDICINE

## 2022-05-29 PROCEDURE — 75810000293 HC SIMP/SUPERF WND  RPR

## 2022-05-29 PROCEDURE — 74011000250 HC RX REV CODE- 250: Performed by: EMERGENCY MEDICINE

## 2022-05-29 PROCEDURE — 73590 X-RAY EXAM OF LOWER LEG: CPT

## 2022-05-29 PROCEDURE — 90471 IMMUNIZATION ADMIN: CPT

## 2022-05-29 PROCEDURE — 90715 TDAP VACCINE 7 YRS/> IM: CPT | Performed by: EMERGENCY MEDICINE

## 2022-05-29 PROCEDURE — 99284 EMERGENCY DEPT VISIT MOD MDM: CPT

## 2022-05-29 RX ORDER — LIDOCAINE HYDROCHLORIDE 10 MG/ML
2 INJECTION INFILTRATION; PERINEURAL ONCE
Status: COMPLETED | OUTPATIENT
Start: 2022-05-29 | End: 2022-05-29

## 2022-05-29 RX ORDER — BACITRACIN 500 UNIT/G
1 PACKET (EA) TOPICAL
Status: DISCONTINUED | OUTPATIENT
Start: 2022-05-29 | End: 2022-05-29 | Stop reason: HOSPADM

## 2022-05-29 RX ORDER — IBUPROFEN 600 MG/1
600 TABLET ORAL
Status: COMPLETED | OUTPATIENT
Start: 2022-05-29 | End: 2022-05-29

## 2022-05-29 RX ORDER — BACITRACIN 500 [USP'U]/G
OINTMENT TOPICAL 3 TIMES DAILY
Qty: 14 G | Refills: 0 | Status: SHIPPED | OUTPATIENT
Start: 2022-05-29 | End: 2022-06-05

## 2022-05-29 RX ORDER — CEPHALEXIN 500 MG/1
500 CAPSULE ORAL 3 TIMES DAILY
Qty: 21 CAPSULE | Refills: 0 | Status: SHIPPED | OUTPATIENT
Start: 2022-05-29 | End: 2022-06-05

## 2022-05-29 RX ORDER — IBUPROFEN 400 MG/1
400 TABLET ORAL
Qty: 20 TABLET | Refills: 0 | Status: SHIPPED | OUTPATIENT
Start: 2022-05-29

## 2022-05-29 RX ORDER — BACITRACIN ZINC 500 UNIT/G
OINTMENT (GRAM) TOPICAL
Status: DISCONTINUED | OUTPATIENT
Start: 2022-05-29 | End: 2022-05-29

## 2022-05-29 RX ADMIN — TETANUS TOXOID, REDUCED DIPHTHERIA TOXOID AND ACELLULAR PERTUSSIS VACCINE, ADSORBED 0.5 ML: 5; 2.5; 8; 8; 2.5 SUSPENSION INTRAMUSCULAR at 19:41

## 2022-05-29 RX ADMIN — WATER 1000 MG: 1 INJECTION INTRAMUSCULAR; INTRAVENOUS; SUBCUTANEOUS at 19:41

## 2022-05-29 RX ADMIN — IBUPROFEN 600 MG: 600 TABLET ORAL at 19:41

## 2022-05-29 RX ADMIN — LIDOCAINE HYDROCHLORIDE 2 ML: 10 INJECTION, SOLUTION INFILTRATION; PERINEURAL at 18:39

## 2022-05-29 NOTE — DISCHARGE INSTRUCTIONS
Wear helmet when you ride. Sutures out in 10 days. Avoid prolonged standing or sitting to avoid blood clots. Wear supportive stockings and elevate leg.
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
## 2022-11-27 ENCOUNTER — HOSPITAL ENCOUNTER (EMERGENCY)
Age: 72
Discharge: ANOTHER HOSPITAL | End: 2022-11-27
Attending: EMERGENCY MEDICINE
Payer: MEDICARE

## 2022-11-27 ENCOUNTER — APPOINTMENT (OUTPATIENT)
Dept: CT IMAGING | Age: 72
End: 2022-11-27
Attending: EMERGENCY MEDICINE
Payer: MEDICARE

## 2022-11-27 VITALS
WEIGHT: 164 LBS | HEIGHT: 65 IN | SYSTOLIC BLOOD PRESSURE: 129 MMHG | OXYGEN SATURATION: 95 % | DIASTOLIC BLOOD PRESSURE: 70 MMHG | RESPIRATION RATE: 16 BRPM | TEMPERATURE: 98.6 F | HEART RATE: 80 BPM | BODY MASS INDEX: 27.32 KG/M2

## 2022-11-27 DIAGNOSIS — S01.81XA FACIAL LACERATION, INITIAL ENCOUNTER: ICD-10-CM

## 2022-11-27 DIAGNOSIS — S00.83XA CONTUSION OF FACE, INITIAL ENCOUNTER: ICD-10-CM

## 2022-11-27 DIAGNOSIS — S05.31XA RUPTURED GLOBE OF RIGHT EYE, INITIAL ENCOUNTER: Primary | ICD-10-CM

## 2022-11-27 DIAGNOSIS — S02.2XXA CLOSED FRACTURE OF NASAL BONE, INITIAL ENCOUNTER: ICD-10-CM

## 2022-11-27 LAB
ANION GAP SERPL CALC-SCNC: 4 MMOL/L (ref 3–18)
APTT PPP: 29.1 SEC (ref 23–36.4)
BASOPHILS # BLD: 0 K/UL (ref 0–0.1)
BASOPHILS NFR BLD: 0 % (ref 0–2)
BUN SERPL-MCNC: 16 MG/DL (ref 7–18)
BUN/CREAT SERPL: 24 (ref 12–20)
CA-I BLD-MCNC: 9.4 MG/DL (ref 8.5–10.1)
CHLORIDE SERPL-SCNC: 107 MMOL/L (ref 100–111)
CO2 SERPL-SCNC: 31 MMOL/L (ref 21–32)
CREAT SERPL-MCNC: 0.68 MG/DL (ref 0.6–1.3)
DIFFERENTIAL METHOD BLD: ABNORMAL
EOSINOPHIL # BLD: 0.1 K/UL (ref 0–0.4)
EOSINOPHIL NFR BLD: 1 % (ref 0–5)
ERYTHROCYTE [DISTWIDTH] IN BLOOD BY AUTOMATED COUNT: 14.9 % (ref 11.6–14.5)
GLUCOSE SERPL-MCNC: 93 MG/DL (ref 74–99)
HCT VFR BLD AUTO: 36.9 % (ref 35–45)
HGB BLD-MCNC: 11.3 G/DL (ref 12–16)
IMM GRANULOCYTES # BLD AUTO: 0 K/UL
IMM GRANULOCYTES NFR BLD AUTO: 0 %
INR PPP: 1 (ref 0.8–1.2)
LYMPHOCYTES # BLD: 0.9 K/UL (ref 0.9–3.6)
LYMPHOCYTES NFR BLD: 15 % (ref 21–52)
MCH RBC QN AUTO: 28.8 PG (ref 24–34)
MCHC RBC AUTO-ENTMCNC: 30.6 G/DL (ref 31–37)
MCV RBC AUTO: 93.9 FL (ref 78–100)
MONOCYTES # BLD: 0 K/UL (ref 0.05–1.2)
MONOCYTES NFR BLD: 0 % (ref 3–10)
NEUTS BAND NFR BLD MANUAL: 9 % (ref 0–5)
NEUTS SEG # BLD: 4.7 K/UL (ref 1.8–8)
NEUTS SEG NFR BLD: 69 % (ref 40–73)
NRBC # BLD: 0 K/UL (ref 0–0.01)
NRBC BLD-RTO: 0 PER 100 WBC
OTHER CELLS NFR BLD MANUAL: 6 %
PLATELET # BLD AUTO: 274 K/UL (ref 135–420)
PMV BLD AUTO: 9.4 FL (ref 9.2–11.8)
POTASSIUM SERPL-SCNC: 3.5 MMOL/L (ref 3.5–5.5)
PROTHROMBIN TIME: 13.4 SEC (ref 11.5–15.2)
RBC # BLD AUTO: 3.93 M/UL (ref 4.2–5.3)
RBC MORPH BLD: ABNORMAL
SODIUM SERPL-SCNC: 142 MMOL/L (ref 136–145)
THERAPEUTIC RANGE,PTTT: NORMAL SEC (ref 82–109)
TROPONIN-HIGH SENSITIVITY: 6 NG/L (ref 0–54)
WBC # BLD AUTO: 6 K/UL (ref 4.6–13.2)

## 2022-11-27 PROCEDURE — 99285 EMERGENCY DEPT VISIT HI MDM: CPT

## 2022-11-27 PROCEDURE — 74011000258 HC RX REV CODE- 258: Performed by: EMERGENCY MEDICINE

## 2022-11-27 PROCEDURE — 96374 THER/PROPH/DIAG INJ IV PUSH: CPT

## 2022-11-27 PROCEDURE — 85730 THROMBOPLASTIN TIME PARTIAL: CPT

## 2022-11-27 PROCEDURE — 84484 ASSAY OF TROPONIN QUANT: CPT

## 2022-11-27 PROCEDURE — 96361 HYDRATE IV INFUSION ADD-ON: CPT

## 2022-11-27 PROCEDURE — 96375 TX/PRO/DX INJ NEW DRUG ADDON: CPT

## 2022-11-27 PROCEDURE — 70486 CT MAXILLOFACIAL W/O DYE: CPT

## 2022-11-27 PROCEDURE — 70450 CT HEAD/BRAIN W/O DYE: CPT

## 2022-11-27 PROCEDURE — 80048 BASIC METABOLIC PNL TOTAL CA: CPT

## 2022-11-27 PROCEDURE — 85025 COMPLETE CBC W/AUTO DIFF WBC: CPT

## 2022-11-27 PROCEDURE — 74011250636 HC RX REV CODE- 250/636: Performed by: EMERGENCY MEDICINE

## 2022-11-27 PROCEDURE — 72125 CT NECK SPINE W/O DYE: CPT

## 2022-11-27 PROCEDURE — 85610 PROTHROMBIN TIME: CPT

## 2022-11-27 RX ORDER — SODIUM CHLORIDE 9 MG/ML
115 INJECTION, SOLUTION INTRAVENOUS ONCE
Status: COMPLETED | OUTPATIENT
Start: 2022-11-27 | End: 2022-11-27

## 2022-11-27 RX ORDER — HYDROMORPHONE HYDROCHLORIDE 1 MG/ML
0.25 INJECTION, SOLUTION INTRAMUSCULAR; INTRAVENOUS; SUBCUTANEOUS ONCE
Status: COMPLETED | OUTPATIENT
Start: 2022-11-27 | End: 2022-11-27

## 2022-11-27 RX ORDER — HYDROMORPHONE HYDROCHLORIDE 1 MG/ML
0.2 INJECTION, SOLUTION INTRAMUSCULAR; INTRAVENOUS; SUBCUTANEOUS ONCE
Status: COMPLETED | OUTPATIENT
Start: 2022-11-27 | End: 2022-11-27

## 2022-11-27 RX ORDER — ONDANSETRON 2 MG/ML
4 INJECTION INTRAMUSCULAR; INTRAVENOUS
Status: COMPLETED | OUTPATIENT
Start: 2022-11-27 | End: 2022-11-27

## 2022-11-27 RX ADMIN — ONDANSETRON 4 MG: 2 INJECTION INTRAMUSCULAR; INTRAVENOUS at 08:44

## 2022-11-27 RX ADMIN — HYDROMORPHONE HYDROCHLORIDE 0.2 MG: 1 INJECTION, SOLUTION INTRAMUSCULAR; INTRAVENOUS; SUBCUTANEOUS at 10:24

## 2022-11-27 RX ADMIN — SODIUM CHLORIDE 115 ML/HR: 9 INJECTION, SOLUTION INTRAVENOUS at 08:43

## 2022-11-27 RX ADMIN — CEFTRIAXONE 1 G: 1 INJECTION, POWDER, FOR SOLUTION INTRAMUSCULAR; INTRAVENOUS at 10:27

## 2022-11-27 RX ADMIN — HYDROMORPHONE HYDROCHLORIDE 0.25 MG: 1 INJECTION, SOLUTION INTRAMUSCULAR; INTRAVENOUS; SUBCUTANEOUS at 08:46

## 2022-11-27 NOTE — ED NOTES
This nurse called transfer center, spoke with [facility]. Call back from [facility] trauma service requested.

## 2022-11-27 NOTE — ED PROVIDER NOTES
Pt c/o fall during sleep, onto bedside table. Doesn't think had loc but unsure. says no sx's when went to sleep last night. C/o facial  pain/wound/ha/neck pain currently. No other pain. No confusion. No dizziness. No weakness or numbness. No fever or cough. No sob. C/o diff opening rt eye due to swelling,says can't open it and eyelid very painful. Neck pain moderate. Facial pain moderate/severe. Last tetanus < 10 yrs ago. Biba.  No treatment pta       Past Medical History:   Diagnosis Date    Acquired hypothyroidism 8/6/2020    Attention-deficit hyperactivity disorder, predominantly inattentive type 8/6/2020    Essential hypertension 8/6/2020    Hyperlipidemia 8/6/2020    OA (osteoarthritis) of knee 6/6/2014    Peptic ulcer 8/6/2020    Varicose veins of lower extremity 8/6/2020       Past Surgical History:   Procedure Laterality Date    HX COLONOSCOPY  07/13/2006    HX GASTRIC BYPASS      HX GI      HX KNEE REPLACEMENT Right 01/2007    HX ORTHOPAEDIC      Right knee         Family History:   Family history unknown: Yes       Social History     Socioeconomic History    Marital status: LIFE PARTNER     Spouse name: Not on file    Number of children: Not on file    Years of education: Not on file    Highest education level: Not on file   Occupational History    Not on file   Tobacco Use    Smoking status: Former    Smokeless tobacco: Never   Vaping Use    Vaping Use: Never used   Substance and Sexual Activity    Alcohol use: Yes     Comment: Occasionally    Drug use: Never    Sexual activity: Not on file   Other Topics Concern    Not on file   Social History Narrative    Not on file     Social Determinants of Health     Financial Resource Strain: Not on file   Food Insecurity: Not on file   Transportation Needs: Not on file   Physical Activity: Not on file   Stress: Not on file   Social Connections: Not on file   Intimate Partner Violence: Not on file   Housing Stability: Not on file         ALLERGIES: Codeine    Review of Systems   Constitutional:  Negative for fever. Respiratory:  Negative for cough and shortness of breath. Cardiovascular:  Negative for chest pain. Gastrointestinal:  Negative for abdominal pain and vomiting. Musculoskeletal:  Positive for neck pain. Negative for back pain. Skin:  Positive for wound. Negative for rash. Neurological:  Positive for headaches. Negative for weakness, light-headedness and numbness. All other systems reviewed and are negative. Vitals:    11/27/22 0647   BP: 137/77   Pulse: 78   Resp: 16   Temp: 98.2 °F (36.8 °C)   SpO2: 95%   Weight: 74.4 kg (164 lb)   Height: 5' 5\" (1.651 m)            Physical Exam  Vitals and nursing note reviewed. Constitutional:       Appearance: She is well-developed. She is not diaphoretic. HENT:      Head: Normocephalic. Comments: + sig rt periorbital swelling/ecchymosis  + 2 lacerations adjacent rt superior lateral eyelid, 2 cm each superficial, no bleeding. No muscle exposed. + one cm wound rt upper lat cheek. No bleeding. No muscle exposed. From. Eyes:      Pupils: Pupils are equal, round, and reactive to light. Comments: + rt lat conjuctival injection       Cardiovascular:      Rate and Rhythm: Normal rate and regular rhythm. Heart sounds: No murmur heard. Pulmonary:      Effort: Pulmonary effort is normal.      Breath sounds: No wheezing. Abdominal:      Palpations: Abdomen is soft. Tenderness: There is no abdominal tenderness. Musculoskeletal:      Cervical back: Normal range of motion. Tenderness present. Skin:     General: Skin is dry. Capillary Refill: Capillary refill takes less than 2 seconds. Findings: No rash. Neurological:      Mental Status: She is alert and oriented to person, place, and time. Sensory: No sensory deficit. Motor: No weakness.    Psychiatric:         Mood and Affect: Mood normal.        MDM         Procedures    Vitals:  Patient Vitals for the past 12 hrs:   Temp Pulse Resp BP SpO2   11/27/22 0647 98.2 °F (36.8 °C) 78 16 137/77 95 %         Medications ordered:   Medications   0.9% sodium chloride infusion (has no administration in time range)   HYDROmorphone (DILAUDID) injection 0.25 mg (has no administration in time range)   ondansetron (ZOFRAN) injection 4 mg (has no administration in time range)         Lab findings:  No results found for this or any previous visit (from the past 12 hour(s)). X-Ray, CT or other radiology findings or impressions:  CT HEAD WO CONT   Final Result      1. No acute intracranial hemorrhage, mass effect, midline shift, or herniation. No definite CT evidence of acute cortical infarct is seen. 2. Traumatic right ocular globe rupture with scattered hemorrhage,   intra-articular, retro-orbital, and in the anterior periorbital soft tissues. Mild proptosis. Likely associated laceration. 3. Mildly displaced bilateral nasal bone fractures. CT MAXILLOFACIAL WO CONT   Final Result      1. Findings consistent with traumatic right ocular globe rupture with associated   intraocular, retro-orbital, and periorbital hemorrhage. Mild proptosis. Probable   associated periorbital laceration. 2. Mildly displaced bilateral nasal bone fractures. No additional maxillofacial   fractures are seen. 3. Left-sided paranasal sinus disease without air-fluid level to suggest acute   sinusitis. CT SPINE CERV WO CONT   Final Result      1. No cervical spine fracture. 2. Trace C3-4 anterior listhesis, suspect due to advanced facet arthropathy at   this level. 3. Bulky solidly bridging anterior osteophytes C4-C6 levels. Please note this cervical spine CT was performed with patient supine. This   supine study does not evaluate for ligamentous injury or exclude instability.     If the patient has persistent symptoms or if otherwise clinically indicated,   erect cervical spine plain films are recommended. Progress notes, Consult notes or additional Procedure notes:   Ct findings noted, d/w pt and family at length. Eye cover placed to protect rt eye. Dilauded ordered for pain  8:30 AM d/w dr Chitra Moy ed, to accept. CRITICAL CARE NOTE:    I have spent 33 minutes of critical care time involved in lab review, consultations with specialist, family decision-making, and documentation. During this entire length of time I was immediately available to the patient. This excludes time spent on separately billable procedures. Critical Care: The reason for providing this level of medical care for this critically ill patient was due a critical illness that impaired one or more vital organ systems such that there was a high probability of imminent or life threatening deterioration in the patients condition. This care involved high complexity decision making to assess, manipulate, and support vital system functions, to treat this degree vital organ system failure and to prevent further life threatening deterioration of the patients condition. Diagnosis:   1. Ruptured globe of right eye, initial encounter    2. Closed fracture of nasal bone, initial encounter    3. Contusion of face, initial encounter    4. Facial laceration, initial encounter        Disposition: tx to [facility]    Follow-up Information    None          Patient's Medications   Start Taking    No medications on file   Continue Taking    ERGOCALCIFEROL (ERGOCALCIFEROL) 1,250 MCG (50,000 UNIT) CAPSULE    TAKE 1 CAPSULE BY MOUTH ONCE A WEEK    IBUPROFEN (MOTRIN) 400 MG TABLET    Take 1 Tablet by mouth every six (6) hours as needed for Pain. LEVOTHYROXINE (SYNTHROID) 200 MCG TABLET    TAKE 1 TABLET BY MOUTH EVERY DAY    PAROXETINE (PAXIL) 40 MG TABLET    Take 1 Tablet by mouth daily.     SOLIFENACIN (VESICARE) 10 MG TABLET    Vesicare 10 mg tablet   TAKE 1 TABLET BY MOUTH EVERY DAY    VARICELLA-ZOSTER RECOMBINANT, PF, (SHINGRIX, PF,) 50 MCG/0.5 ML SUSR INJECTION    Shingrix (PF) 50 mcg/0.5 mL intramuscular suspension, kit   PHARMACIST ADMINISTERED IMMUNIZATION ADMINISTERED AT TIME OF DISPENSING   These Medications have changed    No medications on file   Stop Taking    No medications on file

## 2022-11-27 NOTE — ROUTINE PROCESS
TRANSFER - OUT REPORT:    Verbal report given to Lina Garnica RN(name) on Fidencio Dandy  being transferred to [facility]   ER(unit) for routine progression of care       Report consisted of patients Situation, Background, Assessment and   Recommendations(SBAR). Information from the following report(s) SBAR was reviewed with the receiving nurse. Lines:   Peripheral IV 11/27/22 Left Antecubital (Active)   Site Assessment Clean, dry, & intact 11/27/22 0833   Phlebitis Assessment 0 11/27/22 0833   Infiltration Assessment 0 11/27/22 0833   Dressing Status Clean, dry, & intact 11/27/22 0833   Dressing Type Transparent 11/27/22 0833   Hub Color/Line Status Patent; Flushed 11/27/22 7680        Opportunity for questions and clarification was provided.       Patient transported with:   Monitor

## 2022-11-27 NOTE — ED NOTES
Verbal shift change report given to MIL Taylor (oncoming nurse) by Kenisha Rosas (offgoing nurse). Report included the following information SBAR.

## 2022-11-27 NOTE — ED NOTES
Patient states sensation of increased pressure to right eye. C/o pain increasing. Rates pain at 6/10. MD made aware.

## 2022-11-27 NOTE — ED NOTES
Patient received by Medical Transport Team for transport to inpatient bed. Patient in stable condition at time of transport. Personal belongings accompanying patient to [place] [id number]. Medical transport team received report on patient's condition and concerns at time of departure. Transport team denies further questions or concerns related to patient at time of departure.

## 2022-11-27 NOTE — ED NOTES
Per transfer center, patient has been accepted by trauma service. Transfer center is arranging transportation.

## 2023-05-19 RX ORDER — ERGOCALCIFEROL 1.25 MG/1
1 CAPSULE ORAL WEEKLY
COMMUNITY
Start: 2021-08-16

## 2023-05-19 RX ORDER — SOLIFENACIN SUCCINATE 10 MG/1
TABLET, FILM COATED ORAL
COMMUNITY

## 2023-05-19 RX ORDER — IBUPROFEN 400 MG/1
400 TABLET ORAL EVERY 6 HOURS PRN
COMMUNITY
Start: 2022-05-29

## 2023-05-19 RX ORDER — LEVOTHYROXINE SODIUM 0.2 MG/1
1 TABLET ORAL DAILY
COMMUNITY
Start: 2022-01-14

## 2023-05-19 RX ORDER — PAROXETINE HYDROCHLORIDE 40 MG/1
40 TABLET, FILM COATED ORAL DAILY
COMMUNITY
Start: 2021-11-19

## 2023-05-19 RX ORDER — ZOSTER VACCINE RECOMBINANT, ADJUVANTED 50 MCG/0.5
KIT INTRAMUSCULAR
COMMUNITY

## 2024-06-13 ENCOUNTER — HOSPITAL ENCOUNTER (OUTPATIENT)
Age: 74
Discharge: HOME | End: 2024-06-13
Payer: MEDICARE

## 2024-06-13 DIAGNOSIS — I10 HYPERTENSION, ESSENTIAL: ICD-10-CM

## 2024-06-13 DIAGNOSIS — E78.5 HYPERLIPIDEMIA, UNSPECIFIED HYPERLIPIDEMIA TYPE: ICD-10-CM

## 2024-06-13 DIAGNOSIS — I51.9 MYXEDEMA HEART DISEASE: ICD-10-CM

## 2024-06-13 DIAGNOSIS — E55.9 AVITAMINOSIS D: ICD-10-CM

## 2024-06-13 DIAGNOSIS — E03.9 MYXEDEMA HEART DISEASE: ICD-10-CM

## 2024-06-13 LAB
25(OH)D3 SERPL-MCNC: 38.8 NG/ML (ref 30–100)
ALBUMIN SERPL-MCNC: 3.6 G/DL (ref 3.4–5)
ALBUMIN/GLOB SERPL: 0.8 (ref 0.8–1.7)
ALP SERPL-CCNC: 127 U/L (ref 45–117)
ALT SERPL-CCNC: 13 U/L (ref 13–56)
ANION GAP SERPL CALC-SCNC: 6 MMOL/L (ref 3–18)
AST SERPL W P-5'-P-CCNC: 14 U/L (ref 10–38)
BILIRUB DIRECT SERPL-MCNC: 0.1 MG/DL (ref 0–0.2)
BILIRUB SERPL-MCNC: 0.5 MG/DL (ref 0.2–1)
BUN SERPL-MCNC: 16 MG/DL (ref 7–18)
BUN/CREAT SERPL: 25 (ref 12–20)
CA-I BLD-MCNC: 9.4 MG/DL (ref 8.5–10.1)
CHLORIDE SERPL-SCNC: 105 MMOL/L (ref 100–111)
CHOLEST SERPL-MCNC: 262 MG/DL
CO2 SERPL-SCNC: 29 MMOL/L (ref 21–32)
CREAT SERPL-MCNC: 0.63 MG/DL (ref 0.6–1.3)
ERYTHROCYTE [DISTWIDTH] IN BLOOD BY AUTOMATED COUNT: 14.7 % (ref 11.6–14.5)
GLOBULIN SER CALC-MCNC: 4.5 G/DL (ref 2–4)
GLUCOSE SERPL-MCNC: 148 MG/DL (ref 74–99)
HCT VFR BLD AUTO: 42.4 % (ref 35–45)
HDLC SERPL-MCNC: 76 MG/DL (ref 40–60)
HDLC SERPL: 3.4 (ref 0–5)
HGB BLD-MCNC: 13.2 G/DL (ref 12–16)
LDLC SERPL CALC-MCNC: 170.2 MG/DL (ref 0–100)
LIPID PANEL: ABNORMAL
MCH RBC QN AUTO: 28 PG (ref 24–34)
MCHC RBC AUTO-ENTMCNC: 31.1 G/DL (ref 31–37)
MCV RBC AUTO: 89.8 FL (ref 78–100)
NRBC # BLD: 0 K/UL (ref 0–0.01)
NRBC BLD-RTO: 0 PER 100 WBC
PLATELET # BLD AUTO: 345 K/UL (ref 135–420)
PMV BLD AUTO: 9.6 FL (ref 9.2–11.8)
POTASSIUM SERPL-SCNC: 3.5 MMOL/L (ref 3.5–5.5)
PROT SERPL-MCNC: 8.1 G/DL (ref 6.4–8.2)
RBC # BLD AUTO: 4.72 M/UL (ref 4.2–5.3)
SODIUM SERPL-SCNC: 140 MMOL/L (ref 136–145)
TRIGL SERPL-MCNC: 79 MG/DL
TSH SERPL DL<=0.05 MIU/L-ACNC: 0.33 UIU/ML (ref 0.36–3.74)
VLDLC SERPL CALC-MCNC: 15.8 MG/DL
WBC # BLD AUTO: 5.7 K/UL (ref 4.6–13.2)

## 2024-06-13 PROCEDURE — 80076 HEPATIC FUNCTION PANEL: CPT

## 2024-06-13 PROCEDURE — 36415 COLL VENOUS BLD VENIPUNCTURE: CPT

## 2024-06-13 PROCEDURE — 80061 LIPID PANEL: CPT

## 2024-06-13 PROCEDURE — 84443 ASSAY THYROID STIM HORMONE: CPT

## 2024-06-13 PROCEDURE — 80048 BASIC METABOLIC PNL TOTAL CA: CPT

## 2024-06-13 PROCEDURE — 85027 COMPLETE CBC AUTOMATED: CPT

## 2024-06-13 PROCEDURE — 82306 VITAMIN D 25 HYDROXY: CPT

## 2024-08-09 ENCOUNTER — HOSPITAL ENCOUNTER (OUTPATIENT)
Age: 74
End: 2024-08-09
Payer: MEDICARE

## 2024-08-09 VITALS — WEIGHT: 164 LBS | BODY MASS INDEX: 27.32 KG/M2 | HEIGHT: 65 IN

## 2024-08-09 DIAGNOSIS — Z12.31 OTHER SCREENING MAMMOGRAM: ICD-10-CM

## 2024-08-09 PROCEDURE — 77063 BREAST TOMOSYNTHESIS BI: CPT

## 2025-03-24 ENCOUNTER — APPOINTMENT (OUTPATIENT)
Age: 75
End: 2025-03-24
Payer: MEDICARE

## 2025-03-24 ENCOUNTER — HOSPITAL ENCOUNTER (OUTPATIENT)
Age: 75
Setting detail: OBSERVATION
Discharge: ANOTHER HOSPITAL | End: 2025-03-25
Attending: FAMILY MEDICINE | Admitting: INTERNAL MEDICINE
Payer: MEDICARE

## 2025-03-24 DIAGNOSIS — J98.11 COLLAPSE OF LEFT LUNG: ICD-10-CM

## 2025-03-24 DIAGNOSIS — E86.0 DEHYDRATION: ICD-10-CM

## 2025-03-24 DIAGNOSIS — D72.829 LEUKOCYTOSIS, UNSPECIFIED TYPE: ICD-10-CM

## 2025-03-24 DIAGNOSIS — J90 PLEURAL EFFUSION ON LEFT: Primary | ICD-10-CM

## 2025-03-24 PROBLEM — R06.00 DYSPNEA: Status: ACTIVE | Noted: 2025-03-24

## 2025-03-24 LAB
ALBUMIN SERPL-MCNC: 2.5 G/DL (ref 3.4–5)
ALBUMIN/GLOB SERPL: 0.5 (ref 0.8–1.7)
ALP SERPL-CCNC: 121 U/L (ref 45–117)
ALT SERPL-CCNC: 17 U/L (ref 13–56)
ANION GAP SERPL CALC-SCNC: 7 MMOL/L (ref 3–18)
APPEARANCE UR: CLEAR
AST SERPL W P-5'-P-CCNC: 18 U/L (ref 10–38)
BACTERIA URNS QL MICRO: ABNORMAL /HPF
BASOPHILS # BLD: 0 K/UL (ref 0–0.1)
BASOPHILS NFR BLD: 0 % (ref 0–2)
BILIRUB SERPL-MCNC: 1.1 MG/DL (ref 0.2–1)
BILIRUB UR QL: ABNORMAL
BUN SERPL-MCNC: 19 MG/DL (ref 7–18)
BUN/CREAT SERPL: 28 (ref 12–20)
CA-I BLD-MCNC: 8.9 MG/DL (ref 8.5–10.1)
CHLORIDE SERPL-SCNC: 100 MMOL/L (ref 100–111)
CO2 SERPL-SCNC: 27 MMOL/L (ref 21–32)
COLOR UR: ABNORMAL
CREAT SERPL-MCNC: 0.68 MG/DL (ref 0.6–1.3)
DIFFERENTIAL METHOD BLD: ABNORMAL
EKG ATRIAL RATE: 88 BPM
EKG DIAGNOSIS: NORMAL
EKG P AXIS: 102 DEGREES
EKG P-R INTERVAL: 188 MS
EKG Q-T INTERVAL: 368 MS
EKG QRS DURATION: 94 MS
EKG QTC CALCULATION (BAZETT): 445 MS
EKG R AXIS: 41 DEGREES
EKG T AXIS: 9 DEGREES
EKG VENTRICULAR RATE: 88 BPM
EOSINOPHIL # BLD: 0 K/UL (ref 0–0.4)
EOSINOPHIL NFR BLD: 0 % (ref 0–5)
EPITH CASTS URNS QL MICRO: ABNORMAL /LPF (ref 0–20)
ERYTHROCYTE [DISTWIDTH] IN BLOOD BY AUTOMATED COUNT: 15.4 % (ref 11.6–14.5)
FLUAV RNA SPEC QL NAA+PROBE: NOT DETECTED
FLUBV RNA SPEC QL NAA+PROBE: NOT DETECTED
GLOBULIN SER CALC-MCNC: 5 G/DL (ref 2–4)
GLUCOSE SERPL-MCNC: 123 MG/DL (ref 74–99)
GLUCOSE UR STRIP.AUTO-MCNC: NEGATIVE MG/DL
HCT VFR BLD AUTO: 35.8 % (ref 35–45)
HEMOCCULT SP1 STL QL: POSITIVE
HGB BLD-MCNC: 11.3 G/DL (ref 12–16)
HGB UR QL STRIP: NEGATIVE
IMM GRANULOCYTES # BLD AUTO: 0 K/UL
IMM GRANULOCYTES NFR BLD AUTO: 0 %
KETONES UR QL STRIP.AUTO: NEGATIVE MG/DL
LACTATE SERPL-SCNC: 1.4 MMOL/L (ref 0.4–2)
LACTATE SERPL-SCNC: 2.3 MMOL/L (ref 0.4–2)
LEUKOCYTE ESTERASE UR QL STRIP.AUTO: ABNORMAL
LIPASE SERPL-CCNC: 14 U/L (ref 13–75)
LYMPHOCYTES # BLD: 1.19 K/UL (ref 0.9–3.6)
LYMPHOCYTES NFR BLD: 5 % (ref 21–52)
MCH RBC QN AUTO: 26.7 PG (ref 24–34)
MCHC RBC AUTO-ENTMCNC: 31.6 G/DL (ref 31–37)
MCV RBC AUTO: 84.6 FL (ref 78–100)
MONOCYTES # BLD: 1.67 K/UL (ref 0.05–1.2)
MONOCYTES NFR BLD: 7 % (ref 3–10)
NEUTS BAND NFR BLD MANUAL: 11 % (ref 0–5)
NEUTS SEG # BLD: 20.94 K/UL (ref 1.8–8)
NEUTS SEG NFR BLD: 77 % (ref 40–73)
NITRITE UR QL STRIP.AUTO: NEGATIVE
NRBC # BLD: 0 K/UL (ref 0–0.01)
NRBC BLD-RTO: 0 PER 100 WBC
PH UR STRIP: 6 (ref 5–8)
PLATELET # BLD AUTO: 440 K/UL (ref 135–420)
PMV BLD AUTO: 9.9 FL (ref 9.2–11.8)
POTASSIUM SERPL-SCNC: 3.4 MMOL/L (ref 3.5–5.5)
PROCALCITONIN SERPL-MCNC: 0.34 NG/ML
PROT SERPL-MCNC: 7.5 G/DL (ref 6.4–8.2)
PROT UR STRIP-MCNC: 100 MG/DL
RBC # BLD AUTO: 4.23 M/UL (ref 4.2–5.3)
RBC #/AREA URNS HPF: ABNORMAL /HPF (ref 0–2)
RBC MORPH BLD: ABNORMAL
SARS-COV-2 RNA RESP QL NAA+PROBE: NOT DETECTED
SODIUM SERPL-SCNC: 134 MMOL/L (ref 136–145)
SP GR UR REFRACTOMETRY: >1.03 (ref 1–1.03)
TROPONIN I SERPL HS-MCNC: 3 NG/L (ref 0–54)
TROPONIN I SERPL HS-MCNC: <3 NG/L (ref 0–54)
UROBILINOGEN UR QL STRIP.AUTO: 4 EU/DL (ref 0.2–1)
WBC # BLD AUTO: 23.8 K/UL (ref 4.6–13.2)
WBC URNS QL MICRO: ABNORMAL /HPF (ref 0–4)

## 2025-03-24 PROCEDURE — 71260 CT THORAX DX C+: CPT

## 2025-03-24 PROCEDURE — 83690 ASSAY OF LIPASE: CPT

## 2025-03-24 PROCEDURE — 96361 HYDRATE IV INFUSION ADD-ON: CPT

## 2025-03-24 PROCEDURE — 84145 PROCALCITONIN (PCT): CPT

## 2025-03-24 PROCEDURE — 96366 THER/PROPH/DIAG IV INF ADDON: CPT

## 2025-03-24 PROCEDURE — 80053 COMPREHEN METABOLIC PANEL: CPT

## 2025-03-24 PROCEDURE — 93005 ELECTROCARDIOGRAM TRACING: CPT | Performed by: FAMILY MEDICINE

## 2025-03-24 PROCEDURE — 85025 COMPLETE CBC W/AUTO DIFF WBC: CPT

## 2025-03-24 PROCEDURE — 6360000002 HC RX W HCPCS: Performed by: FAMILY MEDICINE

## 2025-03-24 PROCEDURE — 84484 ASSAY OF TROPONIN QUANT: CPT

## 2025-03-24 PROCEDURE — 83605 ASSAY OF LACTIC ACID: CPT

## 2025-03-24 PROCEDURE — 87636 SARSCOV2 & INF A&B AMP PRB: CPT

## 2025-03-24 PROCEDURE — 87086 URINE CULTURE/COLONY COUNT: CPT

## 2025-03-24 PROCEDURE — 81001 URINALYSIS AUTO W/SCOPE: CPT

## 2025-03-24 PROCEDURE — 2580000003 HC RX 258: Performed by: FAMILY MEDICINE

## 2025-03-24 PROCEDURE — 82272 OCCULT BLD FECES 1-3 TESTS: CPT

## 2025-03-24 PROCEDURE — 99285 EMERGENCY DEPT VISIT HI MDM: CPT

## 2025-03-24 PROCEDURE — 74022 RADEX COMPL AQT ABD SERIES: CPT

## 2025-03-24 PROCEDURE — 2580000003 HC RX 258: Performed by: EMERGENCY MEDICINE

## 2025-03-24 PROCEDURE — 6360000004 HC RX CONTRAST MEDICATION: Performed by: FAMILY MEDICINE

## 2025-03-24 PROCEDURE — 87040 BLOOD CULTURE FOR BACTERIA: CPT

## 2025-03-24 PROCEDURE — 96365 THER/PROPH/DIAG IV INF INIT: CPT

## 2025-03-24 RX ORDER — IOPAMIDOL 755 MG/ML
100 INJECTION, SOLUTION INTRAVASCULAR
Status: COMPLETED | OUTPATIENT
Start: 2025-03-24 | End: 2025-03-24

## 2025-03-24 RX ORDER — 0.9 % SODIUM CHLORIDE 0.9 %
500 INTRAVENOUS SOLUTION INTRAVENOUS ONCE
Status: COMPLETED | OUTPATIENT
Start: 2025-03-24 | End: 2025-03-25

## 2025-03-24 RX ORDER — 0.9 % SODIUM CHLORIDE 0.9 %
650 INTRAVENOUS SOLUTION INTRAVENOUS ONCE
Status: COMPLETED | OUTPATIENT
Start: 2025-03-24 | End: 2025-03-24

## 2025-03-24 RX ADMIN — SODIUM CHLORIDE 500 ML: 0.9 INJECTION, SOLUTION INTRAVENOUS at 23:15

## 2025-03-24 RX ADMIN — SODIUM CHLORIDE 650 ML: 0.9 INJECTION, SOLUTION INTRAVENOUS at 14:00

## 2025-03-24 RX ADMIN — IOPAMIDOL 100 ML: 755 INJECTION, SOLUTION INTRAVENOUS at 13:18

## 2025-03-24 RX ADMIN — PIPERACILLIN AND TAZOBACTAM 4500 MG: 4; .5 INJECTION, POWDER, LYOPHILIZED, FOR SOLUTION INTRAVENOUS at 22:56

## 2025-03-24 RX ADMIN — PIPERACILLIN AND TAZOBACTAM 4500 MG: 4; .5 INJECTION, POWDER, FOR SOLUTION INTRAVENOUS at 14:36

## 2025-03-24 ASSESSMENT — LIFESTYLE VARIABLES
HOW OFTEN DO YOU HAVE A DRINK CONTAINING ALCOHOL: MONTHLY OR LESS
HOW MANY STANDARD DRINKS CONTAINING ALCOHOL DO YOU HAVE ON A TYPICAL DAY: 1 OR 2

## 2025-03-24 ASSESSMENT — PAIN SCALES - GENERAL: PAINLEVEL_OUTOF10: 0

## 2025-03-24 ASSESSMENT — PAIN DESCRIPTION - ORIENTATION: ORIENTATION: LEFT

## 2025-03-24 ASSESSMENT — PAIN - FUNCTIONAL ASSESSMENT: PAIN_FUNCTIONAL_ASSESSMENT: 0-10

## 2025-03-24 ASSESSMENT — PAIN DESCRIPTION - LOCATION: LOCATION: RIB CAGE

## 2025-03-24 NOTE — ED NOTES
Bedside and Verbal shift change report given to aDrryl Doshi MIssy (oncoming nurse) by Angelica (offgoing nurse). Report included the following information Nurse Handoff Report, ED Encounter Summary, ED SBAR, MAR, and Recent Results. Patient has been accepted to [facility], wait time for bed was 7 hours, patient and family made aware

## 2025-03-24 NOTE — ED PROVIDER NOTES
Percentile Diastolic BP Percentile Temp Temp Source Pulse Respirations SpO2   03/24/25 1042 -- -- 03/24/25 1042 03/24/25 1042 03/24/25 1042 03/24/25 1042 03/24/25 1044   113/68   99 °F (37.2 °C) Oral (!) 103 22 96 %      Height Weight - Scale         03/24/25 1042 03/24/25 1042         1.651 m (5' 5\") 54.4 kg (120 lb)             Physical Exam  Vitals and nursing note reviewed.   Constitutional:       General: She is not in acute distress.     Appearance: Normal appearance. She is not ill-appearing, toxic-appearing or diaphoretic.      Comments: Talks in complete sentences   HENT:      Head: Normocephalic and atraumatic.      Mouth/Throat:      Mouth: Mucous membranes are dry.   Eyes:      Comments: Blind right eye   Cardiovascular:      Rate and Rhythm: Normal rate.   Pulmonary:      Comments: Decreased lung sounds in the left side, some chest wall tenderness appreciated with palpation of the left side rib regions 4 through 7  Chest:      Chest wall: Tenderness present.   Abdominal:      Palpations: Abdomen is soft.      Tenderness: There is abdominal tenderness. There is guarding.      Comments: Tender with palpation of the epigastric region but no guarding   Musculoskeletal:         General: Normal range of motion.      Right lower leg: No edema.      Left lower leg: No edema.   Skin:     General: Skin is warm and dry.   Neurological:      General: No focal deficit present.      Mental Status: She is alert and oriented to person, place, and time.   Psychiatric:         Mood and Affect: Mood normal.         Behavior: Behavior normal.         DIAGNOSTIC RESULTS     EKG: All EKG's are interpreted by the Emergency Department Physician who either signs or Co-signs this chart in the absence of a cardiologist.    EKG reviewed by myself shows a sinus rhythm rate at 88, normal axis, no ST elevation suggestive of MI    RADIOLOGY:   Non-plain film images such as CT, Ultrasound and MRI are read by the radiologist. Plain  Case discussed with the oncoming physician Dr. Whatley regarding the transfer of the patient.  To [facility]. [WS]      ED Course User Index  [WS] Ramone Ayala DO         CRITICAL CARE TIME   Total Critical Care time was 45 minutes, excluding separately reportable procedures.  There was a high probability of clinically significant/life threatening deterioration in the patient's condition which required my urgent intervention.  The time does not include any procedural time,      Sepsis reassessment at 2:22 PM and March 24, 2025 heart rate 88, blood pressure 120/55, 95% on room air    CONSULTS:  None    PROCEDURES:  Unless otherwise noted below, none     Procedures    FINAL IMPRESSION      1. Pleural effusion on left    2. Collapse of left lung    3. Leukocytosis, unspecified type    4. Dehydration          DISPOSITION/PLAN   DISPOSITION Decision To Transfer 03/24/2025 04:09:00 PM   DISPOSITION CONDITION Stable           PATIENT REFERRED TO:  No follow-up provider specified.    DISCHARGE MEDICATIONS:  New Prescriptions    No medications on file     Controlled Substances Monitoring:          No data to display                (Please note that portions of this note were completed with a voice recognition program.  Efforts were made to edit the dictations but occasionally words are mis-transcribed.)    Ramone Ayala DO (electronically signed)  Attending Emergency Physician           Ramone Ayala DO  03/24/25 2005

## 2025-03-25 ENCOUNTER — APPOINTMENT (OUTPATIENT)
Facility: HOSPITAL | Age: 75
DRG: 193 | End: 2025-03-25
Attending: STUDENT IN AN ORGANIZED HEALTH CARE EDUCATION/TRAINING PROGRAM
Payer: MEDICARE

## 2025-03-25 ENCOUNTER — HOSPITAL ENCOUNTER (INPATIENT)
Facility: HOSPITAL | Age: 75
LOS: 5 days | Discharge: HOME | DRG: 193 | End: 2025-03-30
Attending: STUDENT IN AN ORGANIZED HEALTH CARE EDUCATION/TRAINING PROGRAM | Admitting: STUDENT IN AN ORGANIZED HEALTH CARE EDUCATION/TRAINING PROGRAM
Payer: MEDICARE

## 2025-03-25 VITALS
OXYGEN SATURATION: 92 % | DIASTOLIC BLOOD PRESSURE: 55 MMHG | HEIGHT: 65 IN | TEMPERATURE: 98 F | BODY MASS INDEX: 19.99 KG/M2 | WEIGHT: 120 LBS | SYSTOLIC BLOOD PRESSURE: 94 MMHG | RESPIRATION RATE: 22 BRPM | HEART RATE: 100 BPM

## 2025-03-25 DIAGNOSIS — J86.9 EMPYEMA OF LUNG (HCC): ICD-10-CM

## 2025-03-25 DIAGNOSIS — J85.1 ABSCESS OF LOWER LOBE OF LEFT LUNG WITH PNEUMONIA (HCC): Primary | ICD-10-CM

## 2025-03-25 LAB
ALBUMIN SERPL-MCNC: 1.9 G/DL (ref 3.4–5)
ALBUMIN SERPL-MCNC: 2 G/DL (ref 3.4–5)
ALBUMIN/GLOB SERPL: 0.5 (ref 0.8–1.7)
ALBUMIN/GLOB SERPL: 0.5 (ref 0.8–1.7)
ALP SERPL-CCNC: 105 U/L (ref 45–117)
ALP SERPL-CCNC: 107 U/L (ref 45–117)
ALT SERPL-CCNC: 11 U/L (ref 13–56)
ALT SERPL-CCNC: 12 U/L (ref 13–56)
ANION GAP SERPL CALC-SCNC: 6 MMOL/L (ref 3–18)
APPEARANCE FLD: ABNORMAL
APPEARANCE UR: CLEAR
AST SERPL W P-5'-P-CCNC: 8 U/L (ref 10–38)
AST SERPL W P-5'-P-CCNC: 8 U/L (ref 10–38)
BACTERIA SPEC CULT: NORMAL
BACTERIA URNS QL MICRO: ABNORMAL /HPF
BASOPHILS # BLD: 0 K/UL (ref 0–0.1)
BASOPHILS NFR BLD: 0 % (ref 0–2)
BILIRUB DIRECT SERPL-MCNC: 0.4 MG/DL (ref 0–0.2)
BILIRUB SERPL-MCNC: 0.9 MG/DL (ref 0.2–1)
BILIRUB SERPL-MCNC: 0.9 MG/DL (ref 0.2–1)
BILIRUB UR QL: ABNORMAL
BODY FLD TYPE: NORMAL
BUN SERPL-MCNC: 18 MG/DL (ref 7–18)
BUN/CREAT SERPL: 23 (ref 12–20)
CA-I BLD-MCNC: 8.3 MG/DL (ref 8.5–10.1)
CHLORIDE SERPL-SCNC: 104 MMOL/L (ref 100–111)
CO2 SERPL-SCNC: 27 MMOL/L (ref 21–32)
COLOR FLD: YELLOW
COLOR UR: ABNORMAL
CREAT SERPL-MCNC: 0.79 MG/DL (ref 0.6–1.3)
DIFFERENTIAL METHOD BLD: ABNORMAL
EOSINOPHIL # BLD: 0 K/UL (ref 0–0.4)
EOSINOPHIL NFR BLD: 0 % (ref 0–5)
EPITH CASTS URNS QL MICRO: ABNORMAL /LPF (ref 0–20)
ERYTHROCYTE [DISTWIDTH] IN BLOOD BY AUTOMATED COUNT: 15.2 % (ref 11.5–14.5)
ERYTHROCYTE [DISTWIDTH] IN BLOOD BY AUTOMATED COUNT: 15.4 % (ref 11.6–14.5)
GLOBULIN SER CALC-MCNC: 4.1 G/DL (ref 2–4)
GLOBULIN SER CALC-MCNC: 4.2 G/DL (ref 2–4)
GLUCOSE FLD-MCNC: 49 MG/DL
GLUCOSE SERPL-MCNC: 134 MG/DL (ref 74–99)
GLUCOSE UR STRIP.AUTO-MCNC: NEGATIVE MG/DL
HCT VFR BLD AUTO: 28.2 % (ref 35–47)
HCT VFR BLD AUTO: 30.6 % (ref 35–45)
HGB BLD-MCNC: 8.8 G/DL (ref 11.5–16)
HGB BLD-MCNC: 9.6 G/DL (ref 12–16)
HGB UR QL STRIP: NEGATIVE
IMM GRANULOCYTES # BLD AUTO: 0 K/UL
IMM GRANULOCYTES NFR BLD AUTO: 0 %
INR PPP: 1.2 (ref 0.9–1.1)
KETONES UR QL STRIP.AUTO: NEGATIVE MG/DL
LDH FLD L TO P-CCNC: 1251 U/L
LDH SERPL L TO P-CCNC: 214 U/L (ref 81–246)
LEUKOCYTE ESTERASE UR QL STRIP.AUTO: NEGATIVE
LYMPHOCYTES # BLD: 0.88 K/UL (ref 0.9–3.6)
LYMPHOCYTES NFR BLD: 3 % (ref 21–52)
LYMPHOCYTES NFR FLD: 25 %
Lab: NORMAL
MCH RBC QN AUTO: 25.7 PG (ref 26–34)
MCH RBC QN AUTO: 26.2 PG (ref 24–34)
MCHC RBC AUTO-ENTMCNC: 31.2 G/DL (ref 30–36.5)
MCHC RBC AUTO-ENTMCNC: 31.4 G/DL (ref 31–37)
MCV RBC AUTO: 82.2 FL (ref 80–99)
MCV RBC AUTO: 83.4 FL (ref 78–100)
MONOCYTES # BLD: 1.76 K/UL (ref 0.05–1.2)
MONOCYTES NFR BLD: 6 % (ref 3–10)
MONOS+MACROS NFR FLD: 10 %
NEUTROPHILS NFR FLD: 65 %
NEUTS BAND NFR BLD MANUAL: 4 % (ref 0–5)
NEUTS SEG # BLD: 26.76 K/UL (ref 1.8–8)
NEUTS SEG NFR BLD: 87 % (ref 40–73)
NITRITE UR QL STRIP.AUTO: NEGATIVE
NRBC # BLD: 0 K/UL (ref 0–0.01)
NRBC # BLD: 0 K/UL (ref 0–0.01)
NRBC BLD-RTO: 0 PER 100 WBC
NRBC BLD-RTO: 0 PER 100 WBC
NUC CELL # FLD: 4301 /CU MM
PH FLD: 8
PH UR STRIP: 5.5 (ref 5–8)
PLATELET # BLD AUTO: 339 K/UL (ref 150–400)
PLATELET # BLD AUTO: 404 K/UL (ref 135–420)
PMV BLD AUTO: 9.5 FL (ref 8.9–12.9)
PMV BLD AUTO: 9.6 FL (ref 9.2–11.8)
POTASSIUM SERPL-SCNC: 3.6 MMOL/L (ref 3.5–5.5)
PROT FLD-MCNC: 4 G/DL
PROT SERPL-MCNC: 6.1 G/DL (ref 6.4–8.2)
PROT SERPL-MCNC: 6.1 G/DL (ref 6.4–8.2)
PROT SERPL-MCNC: 6.2 G/DL (ref 6.4–8.2)
PROT UR STRIP-MCNC: 100 MG/DL
PROTHROMBIN TIME: 13.1 SEC (ref 9.2–11.2)
RBC # BLD AUTO: 3.43 M/UL (ref 3.8–5.2)
RBC # BLD AUTO: 3.67 M/UL (ref 4.2–5.3)
RBC # FLD: >100 /CU MM
RBC #/AREA URNS HPF: ABNORMAL /HPF (ref 0–2)
RBC MORPH BLD: ABNORMAL
SODIUM SERPL-SCNC: 137 MMOL/L (ref 136–145)
SP GR UR REFRACTOMETRY: >1.03 (ref 1–1.03)
SPECIMEN SOURCE FLD: ABNORMAL
SPECIMEN SOURCE FLD: NORMAL
UROBILINOGEN UR QL STRIP.AUTO: 2 EU/DL (ref 0.2–1)
WBC # BLD AUTO: 21.3 K/UL (ref 3.6–11)
WBC # BLD AUTO: 29.4 K/UL (ref 4.6–13.2)
WBC URNS QL MICRO: ABNORMAL /HPF (ref 0–4)

## 2025-03-25 PROCEDURE — 6360000002 HC RX W HCPCS: Performed by: STUDENT IN AN ORGANIZED HEALTH CARE EDUCATION/TRAINING PROGRAM

## 2025-03-25 PROCEDURE — 6360000002 HC RX W HCPCS: Performed by: INTERNAL MEDICINE

## 2025-03-25 PROCEDURE — 96366 THER/PROPH/DIAG IV INF ADDON: CPT

## 2025-03-25 PROCEDURE — 85025 COMPLETE CBC W/AUTO DIFF WBC: CPT

## 2025-03-25 PROCEDURE — 71045 X-RAY EXAM CHEST 1 VIEW: CPT

## 2025-03-25 PROCEDURE — 81001 URINALYSIS AUTO W/SCOPE: CPT

## 2025-03-25 PROCEDURE — 85610 PROTHROMBIN TIME: CPT

## 2025-03-25 PROCEDURE — 0W9B30Z DRAINAGE OF LEFT PLEURAL CAVITY WITH DRAINAGE DEVICE, PERCUTANEOUS APPROACH: ICD-10-PCS | Performed by: STUDENT IN AN ORGANIZED HEALTH CARE EDUCATION/TRAINING PROGRAM

## 2025-03-25 PROCEDURE — 51798 US URINE CAPACITY MEASURE: CPT

## 2025-03-25 PROCEDURE — 87205 SMEAR GRAM STAIN: CPT

## 2025-03-25 PROCEDURE — 88112 CYTOPATH CELL ENHANCE TECH: CPT

## 2025-03-25 PROCEDURE — 82945 GLUCOSE OTHER FLUID: CPT

## 2025-03-25 PROCEDURE — 2000000000 HC ICU R&B

## 2025-03-25 PROCEDURE — 6360000002 HC RX W HCPCS: Performed by: NURSE PRACTITIONER

## 2025-03-25 PROCEDURE — 85027 COMPLETE CBC AUTOMATED: CPT

## 2025-03-25 PROCEDURE — 83986 ASSAY PH BODY FLUID NOS: CPT

## 2025-03-25 PROCEDURE — 84155 ASSAY OF PROTEIN SERUM: CPT

## 2025-03-25 PROCEDURE — 96372 THER/PROPH/DIAG INJ SC/IM: CPT

## 2025-03-25 PROCEDURE — 80076 HEPATIC FUNCTION PANEL: CPT

## 2025-03-25 PROCEDURE — 3E0L317 INTRODUCTION OF OTHER THROMBOLYTIC INTO PLEURAL CAVITY, PERCUTANEOUS APPROACH: ICD-10-PCS | Performed by: STUDENT IN AN ORGANIZED HEALTH CARE EDUCATION/TRAINING PROGRAM

## 2025-03-25 PROCEDURE — 2500000003 HC RX 250 WO HCPCS: Performed by: STUDENT IN AN ORGANIZED HEALTH CARE EDUCATION/TRAINING PROGRAM

## 2025-03-25 PROCEDURE — G0378 HOSPITAL OBSERVATION PER HR: HCPCS

## 2025-03-25 PROCEDURE — 96367 TX/PROPH/DG ADDL SEQ IV INF: CPT

## 2025-03-25 PROCEDURE — 2500000003 HC RX 250 WO HCPCS: Performed by: NURSE PRACTITIONER

## 2025-03-25 PROCEDURE — 87070 CULTURE OTHR SPECIMN AEROBIC: CPT

## 2025-03-25 PROCEDURE — 6360000002 HC RX W HCPCS

## 2025-03-25 PROCEDURE — 6370000000 HC RX 637 (ALT 250 FOR IP): Performed by: NURSE PRACTITIONER

## 2025-03-25 PROCEDURE — 84157 ASSAY OF PROTEIN OTHER: CPT

## 2025-03-25 PROCEDURE — 80053 COMPREHEN METABOLIC PANEL: CPT

## 2025-03-25 PROCEDURE — 2580000003 HC RX 258: Performed by: NURSE PRACTITIONER

## 2025-03-25 PROCEDURE — 89050 BODY FLUID CELL COUNT: CPT

## 2025-03-25 PROCEDURE — 94761 N-INVAS EAR/PLS OXIMETRY MLT: CPT

## 2025-03-25 PROCEDURE — 83615 LACTATE (LD) (LDH) ENZYME: CPT

## 2025-03-25 PROCEDURE — 2580000003 HC RX 258: Performed by: STUDENT IN AN ORGANIZED HEALTH CARE EDUCATION/TRAINING PROGRAM

## 2025-03-25 PROCEDURE — 51702 INSERT TEMP BLADDER CATH: CPT

## 2025-03-25 PROCEDURE — 2700000000 HC OXYGEN THERAPY PER DAY

## 2025-03-25 PROCEDURE — 6370000000 HC RX 637 (ALT 250 FOR IP): Performed by: STUDENT IN AN ORGANIZED HEALTH CARE EDUCATION/TRAINING PROGRAM

## 2025-03-25 RX ORDER — TROSPIUM CHLORIDE 20 MG/1
20 TABLET, FILM COATED ORAL
Status: DISCONTINUED | OUTPATIENT
Start: 2025-03-25 | End: 2025-03-25 | Stop reason: HOSPADM

## 2025-03-25 RX ORDER — ONDANSETRON 2 MG/ML
4 INJECTION INTRAMUSCULAR; INTRAVENOUS EVERY 6 HOURS PRN
Status: DISCONTINUED | OUTPATIENT
Start: 2025-03-25 | End: 2025-03-30 | Stop reason: HOSPADM

## 2025-03-25 RX ORDER — 0.9 % SODIUM CHLORIDE 0.9 %
250 INTRAVENOUS SOLUTION INTRAVENOUS ONCE
Status: COMPLETED | OUTPATIENT
Start: 2025-03-25 | End: 2025-03-25

## 2025-03-25 RX ORDER — PAROXETINE 20 MG/1
40 TABLET, FILM COATED ORAL DAILY
Status: DISCONTINUED | OUTPATIENT
Start: 2025-03-26 | End: 2025-03-30 | Stop reason: HOSPADM

## 2025-03-25 RX ORDER — PAROXETINE 20 MG/1
40 TABLET, FILM COATED ORAL DAILY
Status: DISCONTINUED | OUTPATIENT
Start: 2025-03-25 | End: 2025-03-25 | Stop reason: HOSPADM

## 2025-03-25 RX ORDER — POLYETHYLENE GLYCOL 3350 17 G/17G
17 POWDER, FOR SOLUTION ORAL DAILY PRN
Status: DISCONTINUED | OUTPATIENT
Start: 2025-03-25 | End: 2025-03-30 | Stop reason: HOSPADM

## 2025-03-25 RX ORDER — LIDOCAINE 4 G/G
1 PATCH TOPICAL DAILY
Status: DISCONTINUED | OUTPATIENT
Start: 2025-03-25 | End: 2025-03-30 | Stop reason: HOSPADM

## 2025-03-25 RX ORDER — ACETAMINOPHEN 650 MG/1
650 SUPPOSITORY RECTAL EVERY 6 HOURS PRN
Status: DISCONTINUED | OUTPATIENT
Start: 2025-03-25 | End: 2025-03-30 | Stop reason: HOSPADM

## 2025-03-25 RX ORDER — MAGNESIUM SULFATE IN WATER 40 MG/ML
2000 INJECTION, SOLUTION INTRAVENOUS PRN
Status: DISCONTINUED | OUTPATIENT
Start: 2025-03-25 | End: 2025-03-30 | Stop reason: HOSPADM

## 2025-03-25 RX ORDER — ACETAMINOPHEN 325 MG/1
650 TABLET ORAL EVERY 6 HOURS PRN
Status: DISCONTINUED | OUTPATIENT
Start: 2025-03-25 | End: 2025-03-25 | Stop reason: HOSPADM

## 2025-03-25 RX ORDER — FENTANYL CITRATE 50 UG/ML
INJECTION, SOLUTION INTRAMUSCULAR; INTRAVENOUS
Status: COMPLETED
Start: 2025-03-25 | End: 2025-03-25

## 2025-03-25 RX ORDER — ENOXAPARIN SODIUM 100 MG/ML
40 INJECTION SUBCUTANEOUS DAILY
Status: DISCONTINUED | OUTPATIENT
Start: 2025-03-25 | End: 2025-03-25 | Stop reason: HOSPADM

## 2025-03-25 RX ORDER — SODIUM CHLORIDE 9 MG/ML
INJECTION, SOLUTION INTRAVENOUS PRN
Status: DISCONTINUED | OUTPATIENT
Start: 2025-03-25 | End: 2025-03-30 | Stop reason: HOSPADM

## 2025-03-25 RX ORDER — FENTANYL CITRATE 50 UG/ML
25 INJECTION, SOLUTION INTRAMUSCULAR; INTRAVENOUS ONCE
Status: COMPLETED | OUTPATIENT
Start: 2025-03-25 | End: 2025-03-25

## 2025-03-25 RX ORDER — OXYCODONE HYDROCHLORIDE 5 MG/1
2.5 TABLET ORAL EVERY 4 HOURS PRN
Refills: 0 | Status: DISCONTINUED | OUTPATIENT
Start: 2025-03-25 | End: 2025-03-30 | Stop reason: HOSPADM

## 2025-03-25 RX ORDER — SODIUM CHLORIDE 0.9 % (FLUSH) 0.9 %
5-40 SYRINGE (ML) INJECTION EVERY 12 HOURS SCHEDULED
Status: DISCONTINUED | OUTPATIENT
Start: 2025-03-25 | End: 2025-03-25 | Stop reason: HOSPADM

## 2025-03-25 RX ORDER — VANCOMYCIN 750 MG/150ML
750 INJECTION, SOLUTION INTRAVENOUS EVERY 12 HOURS
Status: DISCONTINUED | OUTPATIENT
Start: 2025-03-25 | End: 2025-03-25 | Stop reason: HOSPADM

## 2025-03-25 RX ORDER — POLYETHYLENE GLYCOL 3350 17 G/17G
17 POWDER, FOR SOLUTION ORAL DAILY PRN
Status: DISCONTINUED | OUTPATIENT
Start: 2025-03-25 | End: 2025-03-25 | Stop reason: HOSPADM

## 2025-03-25 RX ORDER — ERGOCALCIFEROL 1.25 MG/1
50000 CAPSULE, LIQUID FILLED ORAL WEEKLY
Status: DISCONTINUED | OUTPATIENT
Start: 2025-03-25 | End: 2025-03-30 | Stop reason: HOSPADM

## 2025-03-25 RX ORDER — SODIUM CHLORIDE 0.9 % (FLUSH) 0.9 %
5-40 SYRINGE (ML) INJECTION EVERY 12 HOURS SCHEDULED
Status: DISCONTINUED | OUTPATIENT
Start: 2025-03-25 | End: 2025-03-30 | Stop reason: HOSPADM

## 2025-03-25 RX ORDER — ONDANSETRON 4 MG/1
4 TABLET, ORALLY DISINTEGRATING ORAL EVERY 8 HOURS PRN
Status: DISCONTINUED | OUTPATIENT
Start: 2025-03-25 | End: 2025-03-30 | Stop reason: HOSPADM

## 2025-03-25 RX ORDER — LEVOTHYROXINE SODIUM 100 UG/1
200 TABLET ORAL DAILY
Status: DISCONTINUED | OUTPATIENT
Start: 2025-03-26 | End: 2025-03-30 | Stop reason: HOSPADM

## 2025-03-25 RX ORDER — ACETAMINOPHEN 325 MG/1
650 TABLET ORAL EVERY 6 HOURS PRN
Status: DISCONTINUED | OUTPATIENT
Start: 2025-03-25 | End: 2025-03-30 | Stop reason: HOSPADM

## 2025-03-25 RX ORDER — SODIUM CHLORIDE 0.9 % (FLUSH) 0.9 %
5-40 SYRINGE (ML) INJECTION PRN
Status: DISCONTINUED | OUTPATIENT
Start: 2025-03-25 | End: 2025-03-30 | Stop reason: HOSPADM

## 2025-03-25 RX ORDER — SODIUM CHLORIDE 9 MG/ML
INJECTION, SOLUTION INTRAVENOUS PRN
Status: DISCONTINUED | OUTPATIENT
Start: 2025-03-25 | End: 2025-03-25 | Stop reason: HOSPADM

## 2025-03-25 RX ORDER — ONDANSETRON 2 MG/ML
4 INJECTION INTRAMUSCULAR; INTRAVENOUS EVERY 6 HOURS PRN
Status: DISCONTINUED | OUTPATIENT
Start: 2025-03-25 | End: 2025-03-25 | Stop reason: HOSPADM

## 2025-03-25 RX ORDER — POTASSIUM CHLORIDE 29.8 MG/ML
20 INJECTION INTRAVENOUS PRN
Status: DISCONTINUED | OUTPATIENT
Start: 2025-03-25 | End: 2025-03-30 | Stop reason: HOSPADM

## 2025-03-25 RX ORDER — VANCOMYCIN 1 G/200ML
1000 INJECTION, SOLUTION INTRAVENOUS ONCE
Status: COMPLETED | OUTPATIENT
Start: 2025-03-25 | End: 2025-03-25

## 2025-03-25 RX ORDER — POTASSIUM CHLORIDE 750 MG/1
40 TABLET, EXTENDED RELEASE ORAL ONCE
Status: DISCONTINUED | OUTPATIENT
Start: 2025-03-25 | End: 2025-03-25

## 2025-03-25 RX ORDER — POTASSIUM CHLORIDE 7.45 MG/ML
10 INJECTION INTRAVENOUS PRN
Status: DISCONTINUED | OUTPATIENT
Start: 2025-03-25 | End: 2025-03-30 | Stop reason: HOSPADM

## 2025-03-25 RX ORDER — ONDANSETRON 4 MG/1
4 TABLET, ORALLY DISINTEGRATING ORAL EVERY 8 HOURS PRN
Status: DISCONTINUED | OUTPATIENT
Start: 2025-03-25 | End: 2025-03-25 | Stop reason: HOSPADM

## 2025-03-25 RX ORDER — IPRATROPIUM BROMIDE AND ALBUTEROL SULFATE 2.5; .5 MG/3ML; MG/3ML
1 SOLUTION RESPIRATORY (INHALATION) EVERY 4 HOURS PRN
Status: DISCONTINUED | OUTPATIENT
Start: 2025-03-25 | End: 2025-03-30 | Stop reason: HOSPADM

## 2025-03-25 RX ORDER — ACETAMINOPHEN 650 MG/1
650 SUPPOSITORY RECTAL EVERY 6 HOURS PRN
Status: DISCONTINUED | OUTPATIENT
Start: 2025-03-25 | End: 2025-03-25 | Stop reason: HOSPADM

## 2025-03-25 RX ORDER — SODIUM CHLORIDE 0.9 % (FLUSH) 0.9 %
5-40 SYRINGE (ML) INJECTION PRN
Status: DISCONTINUED | OUTPATIENT
Start: 2025-03-25 | End: 2025-03-25 | Stop reason: HOSPADM

## 2025-03-25 RX ADMIN — ERGOCALCIFEROL 50000 UNITS: 1.25 CAPSULE ORAL at 22:06

## 2025-03-25 RX ADMIN — PIPERACILLIN AND TAZOBACTAM 4500 MG: 4; .5 INJECTION, POWDER, LYOPHILIZED, FOR SOLUTION INTRAVENOUS at 07:05

## 2025-03-25 RX ADMIN — ENOXAPARIN SODIUM 40 MG: 100 INJECTION SUBCUTANEOUS at 09:32

## 2025-03-25 RX ADMIN — VANCOMYCIN 1000 MG: 1 INJECTION, SOLUTION INTRAVENOUS at 09:37

## 2025-03-25 RX ADMIN — ACETAMINOPHEN 650 MG: 325 TABLET ORAL at 18:49

## 2025-03-25 RX ADMIN — SODIUM CHLORIDE 250 ML: 0.9 INJECTION, SOLUTION INTRAVENOUS at 21:48

## 2025-03-25 RX ADMIN — PIPERACILLIN AND TAZOBACTAM 3375 MG: 3; .375 INJECTION, POWDER, LYOPHILIZED, FOR SOLUTION INTRAVENOUS at 18:32

## 2025-03-25 RX ADMIN — ACETAMINOPHEN 650 MG: 325 TABLET ORAL at 09:42

## 2025-03-25 RX ADMIN — SODIUM CHLORIDE, PRESERVATIVE FREE 10 ML: 5 INJECTION INTRAVENOUS at 12:09

## 2025-03-25 RX ADMIN — AZITHROMYCIN MONOHYDRATE 500 MG: 500 INJECTION, POWDER, LYOPHILIZED, FOR SOLUTION INTRAVENOUS at 18:36

## 2025-03-25 RX ADMIN — FENTANYL CITRATE 25 MCG: 50 INJECTION, SOLUTION INTRAMUSCULAR; INTRAVENOUS at 18:50

## 2025-03-25 RX ADMIN — SODIUM CHLORIDE 10 MG: 9 INJECTION INTRAMUSCULAR; INTRAVENOUS; SUBCUTANEOUS at 19:14

## 2025-03-25 RX ADMIN — TROSPIUM CHLORIDE 20 MG: 20 TABLET, FILM COATED ORAL at 06:59

## 2025-03-25 RX ADMIN — SODIUM CHLORIDE, PRESERVATIVE FREE 10 ML: 5 INJECTION INTRAVENOUS at 21:50

## 2025-03-25 RX ADMIN — FENTANYL CITRATE 25 MCG: 50 INJECTION INTRAMUSCULAR; INTRAVENOUS at 18:50

## 2025-03-25 RX ADMIN — LEVOTHYROXINE SODIUM 200 MCG: 0.12 TABLET ORAL at 09:32

## 2025-03-25 RX ADMIN — WATER 5 MG: 1 INJECTION INTRAMUSCULAR; INTRAVENOUS; SUBCUTANEOUS at 19:14

## 2025-03-25 RX ADMIN — PAROXETINE HYDROCHLORIDE 40 MG: 20 TABLET, FILM COATED ORAL at 09:32

## 2025-03-25 ASSESSMENT — PAIN DESCRIPTION - FREQUENCY: FREQUENCY: INTERMITTENT

## 2025-03-25 ASSESSMENT — PAIN DESCRIPTION - LOCATION
LOCATION: RIB CAGE
LOCATION: CHEST
LOCATION: HEAD
LOCATION: CHEST
LOCATION: CHEST
LOCATION: RIB CAGE

## 2025-03-25 ASSESSMENT — PAIN SCALES - GENERAL
PAINLEVEL_OUTOF10: 0
PAINLEVEL_OUTOF10: 2
PAINLEVEL_OUTOF10: 5
PAINLEVEL_OUTOF10: 0
PAINLEVEL_OUTOF10: 5
PAINLEVEL_OUTOF10: 7
PAINLEVEL_OUTOF10: 10

## 2025-03-25 ASSESSMENT — PAIN DESCRIPTION - ORIENTATION
ORIENTATION: LEFT

## 2025-03-25 ASSESSMENT — PAIN DESCRIPTION - ONSET: ONSET: OTHER (COMMENT)

## 2025-03-25 ASSESSMENT — PAIN DESCRIPTION - DESCRIPTORS: DESCRIPTORS: ACHING

## 2025-03-25 NOTE — PROGRESS NOTES
TRANSFER - IN REPORT:    Verbal report received from YAMILA Diamond on Cori Dennis  being received from [address] [place] [address] for routine progression of patient care      Report consisted of patient's Situation, Background, Assessment and   Recommendations(SBAR).     Information from the following report(s) Nurse Handoff Report, Intake/Output, MAR, Recent Results, and Cardiac Rhythm Sinus Tachycardia  was reviewed with the receiving nurse.    Opportunity for questions and clarification was provided.      Assessment completed upon patient's arrival to unit and care assumed.     0942-Scheduled meds given. Pt tolerated well.     1135-Patient stated she has not urinated since yesterday. No abdominal distention or discomfort noted. Bladder scan noted 590 ml. Notified KYLEE Nieto NP-new order for delarosa for urinary retention. RBVO.     1145-16 F delarosa inserted. Pt tolerated well. 550 ml of jeri colored urine noted. UA  collected. Lab notified    5296-Transportation arrived for transport to [facility].

## 2025-03-25 NOTE — ED NOTES
TRANSFER - OUT REPORT:    Verbal report given to YAMILA Loera on Cori Dennis  being transferred to [address] [place] [address] for routine progression of patient care       Report consisted of patient's Situation, Background, Assessment and   Recommendations(SBAR).     Information from the following report(s) Nurse Handoff Report, ED Encounter Summary, ED SBAR, and MAR was reviewed with the receiving nurse.    Orient Fall Assessment:    Presents to emergency department  because of falls (Syncope, seizure, or loss of consciousness): No  Age > 70: Yes  Altered Mental Status, Intoxication with alcohol or substance confusion (Disorientation, impaired judgment, poor safety awaremess, or inability to follow instructions): No  Impaired Mobility: Ambulates or transfers with assistive devices or assistance; Unable to ambulate or transer.: No             Lines:   Peripheral IV 03/24/25 Distal;Right Forearm (Active)   Site Assessment Clean, dry & intact 03/24/25 1225   Line Status Blood return noted 03/24/25 1225   Phlebitis Assessment No symptoms 03/24/25 1225   Infiltration Assessment 0 03/24/25 1225        Opportunity for questions and clarification was provided.      Patient transported with:  Monitor, O2 @ 3lpm, and Registered Nurse

## 2025-03-25 NOTE — H&P
Admission History and Physical              NAME: Cori Dennis   :  1950   MRN:  [id number]     Date/Time:  3/25/2025 1:45 AM    Patient PCP: Verna Lynne APRN - NP  _____________________________________________________________________________    Given the patient's current clinical presentation, I have a high level of concern for decompensation if discharged from the emergency department.  Complex decision making was performed, which includes reviewing the patient's available past medical records, laboratory results, and x-ray films.       My assessment of this patient's clinical condition and my plan of care is as follows:    Assessment / Plan:  Dyspnea due to left pleural effusion  --pt accepted at [facility], to be transferred once bed available  --Clinically stable on 2L NC, continue supplemental O2 keep O2 saturation greater than 92%  --WBC/MICHAEL 23.8/77, afebrile, Covid/flu negative, acute abdominal series with left pleural effusion and mild to moderate stool in colon  --CT Chest Loculated left pleural effusion with partial collapse of the left lung  --Given zosyn in ED, will continue    Anxiety: Continue paroxetine    HLD: Continue rosuvastatin    Acquired hypothyroidism: Continue levothyroxine    Overactive bladder: Trospium (substitute for home vesicar)    Estimated LOS 1 day    TOTAL TIME:  45 Minutes    Code Status: Full    Prophylaxis:  chemical and mechanical     Subjective: HPI     Cori Dennis is a 74 y.o.   female who  has a past medical history of Acquired hypothyroidism, Anxiety, Attention-deficit hyperactivity disorder, predominantly inattentive type, Essential hypertension, Hyperlipidemia, OA (osteoarthritis) of knee, Peptic ulcer, Raynaud's disease, and Varicose veins of lower extremity who presents with a 6-day history of left-sided chest pain worse with taking a deep breath.  Patient states pain started on last Tuesday attempted to take Aleve without relief.

## 2025-03-25 NOTE — ED NOTES
Monitor alarmed, heart rate 130-137, pt previous 100-106, MD made aware. Pt resting in position of comfort, no new complaints or concerns noted.

## 2025-03-25 NOTE — PROGRESS NOTES
0700 - Bedside shift change report given to YAMILA Patel (oncoming nurse) by YAMILA Nieto (offgoing nurse). Report included the following information Nurse Handoff Report.     0755 - Received call from provider. BP discussed. BP checked both arms. Provider instructed to transfer patient to ICU.    0810 - Nursing supervisor talking with ICU about transfer.    0830 - Bed assignment received for [facility] ICU [id number] Bed 1. Report to be called to [phone].    0845 - patient transferred to ICU bed 3. Report given to RaymondRN

## 2025-03-25 NOTE — PLAN OF CARE
Problem: Discharge Planning  Goal: Discharge to home or other facility with appropriate resources  3/25/2025 0814 by Jessy Dyer RN  Outcome: Progressing  Flowsheets (Taken 3/25/2025 0730)  Discharge to home or other facility with appropriate resources: Identify discharge learning needs (meds, wound care, etc)  3/25/2025 0209 by Luz Maria Padron RN  Outcome: Progressing  Flowsheets (Taken 3/25/2025 0204)  Discharge to home or other facility with appropriate resources:   Identify barriers to discharge with patient and caregiver   Arrange for needed discharge resources and transportation as appropriate   Identify discharge learning needs (meds, wound care, etc)     Problem: Pain  Goal: Verbalizes/displays adequate comfort level or baseline comfort level  3/25/2025 0814 by Jessy Dyer RN  Outcome: Progressing  3/25/2025 0209 by Luz Maria Padron RN  Outcome: Progressing  Flowsheets (Taken 3/25/2025 0200)  Verbalizes/displays adequate comfort level or baseline comfort level:   Encourage patient to monitor pain and request assistance   Assess pain using appropriate pain scale   Administer analgesics based on type and severity of pain and evaluate response   Implement non-pharmacological measures as appropriate and evaluate response   Consider cultural and social influences on pain and pain management     Problem: Safety - Adult  Goal: Free from fall injury  3/25/2025 0814 by Jessy Dyer RN  Outcome: Progressing  3/25/2025 0209 by Luz Maria Padron RN  Outcome: Progressing     Problem: Skin/Tissue Integrity  Goal: Skin integrity remains intact  Description: 1.  Monitor for areas of redness and/or skin breakdown  2.  Assess vascular access sites hourly  3.  Every 4-6 hours minimum:  Change oxygen saturation probe site  4.  Every 4-6 hours:  If on nasal continuous positive airway pressure, respiratory therapy assess nares and determine need for appliance change or resting period  3/25/2025 0814 by

## 2025-03-25 NOTE — PROGRESS NOTES
Thoracic Surgery Note:    Small to moderate effusion, consolidated left lower lobe with area that is hypoattenuated concerning for lung abscess. Recommend image guided pigtail, pleural fluid studies, cultures, cytology, and will do trial fibrinolytics. Agree to transfer to [facility] and agree to consultation. Lung abscess is not a surgical disease process, and the effusion is small so recommend drainage with pigtail thoracostomy and we will follow.    Valencia Tyler MD  Thoracic Surgeon  [facility] Thoracic Surgery at [address], [address]  Phone: [phone]  Fax: [phone]

## 2025-03-25 NOTE — PROCEDURES
PROCEDURE NOTE  Date: 3/25/2025   Name: Cori Dennis  YOB: 1950    Chest Tube Insertion    Date/Time: 3/25/2025 6:31 PM    Performed by: David Dugan MD  Authorized by: David Dugan MD  Consent: Verbal consent obtained. Written consent obtained.  Risks and benefits: risks, benefits and alternatives were discussed  Consent given by: patient  Patient identity confirmed: verbally with patient  Time out: Immediately prior to procedure a \"time out\" was called to verify the correct patient, procedure, equipment, support staff and site/side marked as required.  Indications: pleural effusion    Sedation:  Patient sedated: no    Anesthesia: local infiltration    Anesthesia:  Local Anesthetic: lidocaine 1% with epinephrine  Anesthetic total: 10 mL  Preparation: sterile field established and skin prepped with chlorhexidine  Placement location: left lateral  Scalpel size: 10  Tube size (Portuguese): 14.  Ultrasound guidance: yes  Tension pneumothorax heard: no  Tube connected to: suction  Drainage characteristics: clear and yellow  Drainage amount: 140 ml  Suture material: 0 silk  Dressing: 4x4 sterile gauze  Post-insertion x-ray findings: tube in good position  Patient tolerance: patient tolerated the procedure well with no immediate complications

## 2025-03-25 NOTE — DISCHARGE SUMMARY
Discharge Summary       PATIENT ID: Cori Dennis  MRN: [id number]   YOB: 1950    DATE OF ADMISSION: 3/24/2025 10:28 AM    DATE OF DISCHARGE: 03/25/25    PRIMARY CARE PROVIDER: Verna Lynne APRN - NP     ATTENDING PHYSICIAN: Susan Hobbs MD  DISCHARGING PROVIDER: Susan Hobbs MD        CONSULTATIONS: IP CONSULT TO PHARMACY    PROCEDURES/SURGERIES: * No surgery found *    Admission Diagnoses:   Dehydration [E86.0]  Dyspnea [R06.00]  Collapse of left lung [J98.11]  Pleural effusion on left [J90]  Leukocytosis, unspecified type [D72.829]    Discharge Medications     Medication List        ASK your doctor about these medications      ergocalciferol 1.25 MG (45062 UT) capsule  Commonly known as: ERGOCALCIFEROL     ibuprofen 400 MG tablet  Commonly known as: ADVIL;MOTRIN     levothyroxine 200 MCG tablet  Commonly known as: SYNTHROID     PARoxetine 40 MG tablet  Commonly known as: PAXIL     Shingrix 50 MCG/0.5ML Susr injection  Generic drug: zoster recombinant adjuvanted vaccine     solifenacin 10 MG tablet  Commonly known as: VESICARE              HPI on Admission (per admitting physician):   Cori Dennis is a 74 y.o.   female who  has a past medical history of Acquired hypothyroidism, Anxiety, Attention-deficit hyperactivity disorder, predominantly inattentive type, Essential hypertension, Hyperlipidemia, OA (osteoarthritis) of knee, Peptic ulcer, Raynaud's disease, and Varicose veins of lower extremity who presents with a 6-day history of left-sided chest pain worse with taking a deep breath.  Patient states pain started on last Tuesday attempted to take Aleve without relief.  States taking a deep breath makes pain worse. Pt denies chills, vomiting, constipation, diarrhea, lightheadedness, weakness, fatigue, denies GI/ symptoms, denies headache. In the ED WBC/MICHAEL 23.8/77, Hgb 11.3, platelets 440, flu/COVID-negative, , K3.4, BUN 19, total bili 1.1, alk phos  K/UL    Differential Type Manual      RBC Comment Microcytosis  1+               Imaging results impression only:  CT CHEST ABDOMEN PELVIS W CONTRAST Additional Contrast? None  Result Date: 3/24/2025  1. Loculated left pleural effusion with partial collapse of the left lung. In the left infrahilar region there is low attenuation within the collapsed lung and underlying mass lesion is difficult to exclude. 2. Large volume of stool within the colon. Electronically signed by Halima Joyce    XR ACUTE ABD SERIES CHEST 1 VW  Result Date: 3/24/2025  1. Left pleural effusion. 2. Mild to moderate stool Electronically signed by BROWN POE         Time : 45 min - spent on Chart and recent lab / Image review, medical exam, Discussion with patient and family, charting and discussion / orders with the nurse and staff.     Susan Hobbs MD   Hospitalist

## 2025-03-25 NOTE — H&P
CRITICAL CARE ADMISSION NOTE    Name: Cori Dennis   : 1950   MRN: [id number]   Date: 3/25/2025      Reason for ICU Admission: Complex L pleural effusion, lung abscess     ICU PROBLEM LIST   Acute hypoxic respiratory failure  Complex L pleural effusion  CAP, Lung abscess   HTN  Hypothyroidism  REM sleep disorder  OA  Raynaulds  ADHD  Anxiety  Blind in R eye    HISTORY OF PRESENT ILLNESS:   Pt is a 73yo F w/ PMH as noted above who presented to [facility] ICU from [facility] for HLOC. Pt presented to OSH due to 2 weeks of progressive fatigue, lethargy, and dyspnea w/ she states began after possible aspiration event and URI like symptoms w/ related non-productive cough and pleuritic pain. No sick contacts or other exposures. Workup at OSH significant for complex L pleural effusion and concern for L lung abscess. [facility] thoracic surgery and CCM consulted and pt transferred to [facility].    On arrival, pt NAD, VSS, on O2 per NC.       NEUROLOGICAL:    PRN pain regimen  Delirium precautions  Home paxil    PULMONOLOGY:   Plan for L chest tube, send fluid studies, Cx  Lytic trial  O2 per NC for spO2 92-98%  PRN bronchodilators  Abx as below  Thoracic surgery consult    CARDIOVASCULAR:   MAP goal >65    GASTROINTESTINAL:   Diet as tolerated     RENAL/ELECTROLYTE/FLUIDS:   Strict I/Os  Monitor renal labs  Mg/Phos/K >2/3/4    ENDOCRINE:   Goal euglycemia  Home synthroid     HEMATOLOGY/ONCOLOGY:   SCDs, lovenox in AM post chest tube    ID/MICRO:   Bcx 3/24 from OSH NGTD  Follow pleural fluid Cx  Zosyn and azithromycin for CAP/atypical coverage       ICU DAILY CHECKLIST     Code Status:Full  DVT Prophylaxis:SCDs  T/L/D: PIV  SUP: NA  Diet: as tolerated  Activity Level:ad ileana  ABCDEF Bundle/Checklist Completed:Yes  Disposition: Stay in ICU  Multidisciplinary Rounds Completed:  Pending  Patient/Family Updated: Yes      Review of Systems:   Negative except as noted above    Past Medical History:      has a past medical

## 2025-03-25 NOTE — PLAN OF CARE
Problem: Discharge Planning  Goal: Discharge to home or other facility with appropriate resources  Outcome: Progressing  Flowsheets (Taken 3/25/2025 0204)  Discharge to home or other facility with appropriate resources:   Identify barriers to discharge with patient and caregiver   Arrange for needed discharge resources and transportation as appropriate   Identify discharge learning needs (meds, wound care, etc)     Problem: Pain  Goal: Verbalizes/displays adequate comfort level or baseline comfort level  Outcome: Progressing     Problem: Safety - Adult  Goal: Free from fall injury  Outcome: Progressing     Problem: Skin/Tissue Integrity  Goal: Skin integrity remains intact  Description: 1.  Monitor for areas of redness and/or skin breakdown  2.  Assess vascular access sites hourly  3.  Every 4-6 hours minimum:  Change oxygen saturation probe site  4.  Every 4-6 hours:  If on nasal continuous positive airway pressure, respiratory therapy assess nares and determine need for appliance change or resting period  Outcome: Progressing

## 2025-03-25 NOTE — PROGRESS NOTES
4 Eyes Skin Assessment     NAME:  Cori Dennis  YOB: 1950  MEDICAL RECORD NUMBER:  [id number]    The patient is being assessed for  Admission    I agree that at least one RN has performed a thorough Head to Toe Skin Assessment on the patient. ALL assessment sites listed below have been assessed.      Areas assessed by both nurses:    Head, Face, Ears, Shoulders, Back, Chest, Arms, Elbows, Hands, Sacrum. Buttock, Coccyx, Ischium, Legs. Feet and Heels, and Under Medical Devices         Does the Patient have a Wound? No noted wound(s)       Willis Prevention initiated by RN: No  Wound Care Orders initiated by RN: No    Pressure Injury (Stage 3,4, Unstageable, DTI, NWPT, and Complex wounds) if present, place Wound referral order by RN under : No    New Ostomies, if present place, Ostomy referral order under : No     Nurse 1 eSignature: Electronically signed by Barber Hunt RN on 3/25/25 at 7:57 PM EDT    **SHARE this note so that the co-signing nurse can place an eSignature**    Nurse 2 eSignature: Electronically signed by Jaida Kim RN on 3/25/25 at 8:00 PM EDT

## 2025-03-25 NOTE — ED NOTES
Patient signed out to me pending transfer to [facility].  Was notified by nursing that there are no beds at [facility] currently and 1 patient will not get a bed tonight.  I spoke with the nurse practitioner covering the hospital.  Will admit the patient overnight here and looks to transfer the patient to [facility] tomorrow.     Antonio Whatley, DO  03/24/25 6896

## 2025-03-26 ENCOUNTER — APPOINTMENT (OUTPATIENT)
Facility: HOSPITAL | Age: 75
DRG: 193 | End: 2025-03-26
Attending: STUDENT IN AN ORGANIZED HEALTH CARE EDUCATION/TRAINING PROGRAM
Payer: MEDICARE

## 2025-03-26 PROBLEM — J85.1 ABSCESS OF LOWER LOBE OF LEFT LUNG WITH PNEUMONIA (HCC): Status: ACTIVE | Noted: 2025-03-26

## 2025-03-26 PROBLEM — D72.829 LEUKOCYTOSIS: Status: ACTIVE | Noted: 2025-03-26

## 2025-03-26 LAB
ALBUMIN SERPL-MCNC: 1.2 G/DL (ref 3.5–5)
ALBUMIN/GLOB SERPL: 0.4 (ref 1.1–2.2)
ALP SERPL-CCNC: 81 U/L (ref 45–117)
ALT SERPL-CCNC: 9 U/L (ref 12–78)
ANION GAP SERPL CALC-SCNC: 8 MMOL/L (ref 2–12)
AST SERPL-CCNC: 12 U/L (ref 15–37)
BASOPHILS # BLD: 0 K/UL (ref 0–0.1)
BASOPHILS NFR BLD: 0 % (ref 0–1)
BILIRUB SERPL-MCNC: 0.4 MG/DL (ref 0.2–1)
BUN SERPL-MCNC: 13 MG/DL (ref 6–20)
BUN/CREAT SERPL: 30 (ref 12–20)
CALCIUM SERPL-MCNC: 6.2 MG/DL (ref 8.5–10.1)
CHLORIDE SERPL-SCNC: 115 MMOL/L (ref 97–108)
CO2 SERPL-SCNC: 19 MMOL/L (ref 21–32)
CREAT SERPL-MCNC: 0.44 MG/DL (ref 0.55–1.02)
DIFFERENTIAL METHOD BLD: ABNORMAL
EOSINOPHIL # BLD: 0.68 K/UL (ref 0–0.4)
EOSINOPHIL NFR BLD: 4 % (ref 0–7)
ERYTHROCYTE [DISTWIDTH] IN BLOOD BY AUTOMATED COUNT: 15.2 % (ref 11.5–14.5)
GLOBULIN SER CALC-MCNC: 3.1 G/DL (ref 2–4)
GLUCOSE SERPL-MCNC: 82 MG/DL (ref 65–100)
HCT VFR BLD AUTO: 21.2 % (ref 35–47)
HCT VFR BLD AUTO: 27.3 % (ref 35–47)
HGB BLD-MCNC: 6.6 G/DL (ref 11.5–16)
HGB BLD-MCNC: 8.6 G/DL (ref 11.5–16)
IMM GRANULOCYTES # BLD AUTO: 0 K/UL
IMM GRANULOCYTES NFR BLD AUTO: 0 %
LYMPHOCYTES # BLD: 0.34 K/UL (ref 0.8–3.5)
LYMPHOCYTES NFR BLD: 2 % (ref 12–49)
MAGNESIUM SERPL-MCNC: 1.5 MG/DL (ref 1.6–2.4)
MCH RBC QN AUTO: 25.8 PG (ref 26–34)
MCHC RBC AUTO-ENTMCNC: 31.1 G/DL (ref 30–36.5)
MCV RBC AUTO: 82.8 FL (ref 80–99)
MONOCYTES # BLD: 0.34 K/UL (ref 0–1)
MONOCYTES NFR BLD: 2 % (ref 5–13)
NEUTS BAND NFR BLD MANUAL: 1 % (ref 0–6)
NEUTS SEG # BLD: 15.64 K/UL (ref 1.8–8)
NEUTS SEG NFR BLD: 91 % (ref 32–75)
NRBC # BLD: 0 K/UL (ref 0–0.01)
NRBC BLD-RTO: 0 PER 100 WBC
PHOSPHATE SERPL-MCNC: 1.8 MG/DL (ref 2.6–4.7)
PLATELET # BLD AUTO: 271 K/UL (ref 150–400)
PMV BLD AUTO: 9.8 FL (ref 8.9–12.9)
POTASSIUM SERPL-SCNC: 2.7 MMOL/L (ref 3.5–5.1)
PROT SERPL-MCNC: 4.3 G/DL (ref 6.4–8.2)
RBC # BLD AUTO: 2.56 M/UL (ref 3.8–5.2)
RBC MORPH BLD: ABNORMAL
RBC MORPH BLD: ABNORMAL
SODIUM SERPL-SCNC: 142 MMOL/L (ref 136–145)
WBC # BLD AUTO: 17 K/UL (ref 3.6–11)

## 2025-03-26 PROCEDURE — 51798 US URINE CAPACITY MEASURE: CPT

## 2025-03-26 PROCEDURE — APPSS45 APP SPLIT SHARED TIME 31-45 MINUTES

## 2025-03-26 PROCEDURE — 97530 THERAPEUTIC ACTIVITIES: CPT

## 2025-03-26 PROCEDURE — 6370000000 HC RX 637 (ALT 250 FOR IP): Performed by: NURSE PRACTITIONER

## 2025-03-26 PROCEDURE — 6360000002 HC RX W HCPCS: Performed by: STUDENT IN AN ORGANIZED HEALTH CARE EDUCATION/TRAINING PROGRAM

## 2025-03-26 PROCEDURE — 97161 PT EVAL LOW COMPLEX 20 MIN: CPT

## 2025-03-26 PROCEDURE — 71045 X-RAY EXAM CHEST 1 VIEW: CPT

## 2025-03-26 PROCEDURE — 99222 1ST HOSP IP/OBS MODERATE 55: CPT | Performed by: NURSE PRACTITIONER

## 2025-03-26 PROCEDURE — 80053 COMPREHEN METABOLIC PANEL: CPT

## 2025-03-26 PROCEDURE — 2000000000 HC ICU R&B

## 2025-03-26 PROCEDURE — 2500000003 HC RX 250 WO HCPCS: Performed by: STUDENT IN AN ORGANIZED HEALTH CARE EDUCATION/TRAINING PROGRAM

## 2025-03-26 PROCEDURE — 6360000002 HC RX W HCPCS: Performed by: NURSE PRACTITIONER

## 2025-03-26 PROCEDURE — 85025 COMPLETE CBC W/AUTO DIFF WBC: CPT

## 2025-03-26 PROCEDURE — 36415 COLL VENOUS BLD VENIPUNCTURE: CPT

## 2025-03-26 PROCEDURE — 6370000000 HC RX 637 (ALT 250 FOR IP): Performed by: STUDENT IN AN ORGANIZED HEALTH CARE EDUCATION/TRAINING PROGRAM

## 2025-03-26 PROCEDURE — 85014 HEMATOCRIT: CPT

## 2025-03-26 PROCEDURE — 83735 ASSAY OF MAGNESIUM: CPT

## 2025-03-26 PROCEDURE — 71250 CT THORAX DX C-: CPT

## 2025-03-26 PROCEDURE — 0W9B30Z DRAINAGE OF LEFT PLEURAL CAVITY WITH DRAINAGE DEVICE, PERCUTANEOUS APPROACH: ICD-10-PCS | Performed by: STUDENT IN AN ORGANIZED HEALTH CARE EDUCATION/TRAINING PROGRAM

## 2025-03-26 PROCEDURE — 2580000003 HC RX 258: Performed by: STUDENT IN AN ORGANIZED HEALTH CARE EDUCATION/TRAINING PROGRAM

## 2025-03-26 PROCEDURE — 84100 ASSAY OF PHOSPHORUS: CPT

## 2025-03-26 PROCEDURE — 97165 OT EVAL LOW COMPLEX 30 MIN: CPT

## 2025-03-26 PROCEDURE — 85018 HEMOGLOBIN: CPT

## 2025-03-26 RX ORDER — POTASSIUM CHLORIDE 750 MG/1
40 TABLET, EXTENDED RELEASE ORAL ONCE
Status: COMPLETED | OUTPATIENT
Start: 2025-03-26 | End: 2025-03-26

## 2025-03-26 RX ORDER — FENTANYL CITRATE 50 UG/ML
25 INJECTION, SOLUTION INTRAMUSCULAR; INTRAVENOUS ONCE
Status: COMPLETED | OUTPATIENT
Start: 2025-03-26 | End: 2025-03-26

## 2025-03-26 RX ORDER — POTASSIUM CHLORIDE 7.45 MG/ML
10 INJECTION INTRAVENOUS
Status: DISCONTINUED | OUTPATIENT
Start: 2025-03-26 | End: 2025-03-26

## 2025-03-26 RX ORDER — FENTANYL CITRATE 50 UG/ML
50 INJECTION, SOLUTION INTRAMUSCULAR; INTRAVENOUS ONCE
Status: COMPLETED | OUTPATIENT
Start: 2025-03-26 | End: 2025-03-26

## 2025-03-26 RX ORDER — ENOXAPARIN SODIUM 100 MG/ML
40 INJECTION SUBCUTANEOUS DAILY
Status: DISCONTINUED | OUTPATIENT
Start: 2025-03-26 | End: 2025-03-30 | Stop reason: HOSPADM

## 2025-03-26 RX ORDER — MAGNESIUM SULFATE IN WATER 40 MG/ML
2000 INJECTION, SOLUTION INTRAVENOUS ONCE
Status: COMPLETED | OUTPATIENT
Start: 2025-03-26 | End: 2025-03-26

## 2025-03-26 RX ORDER — POTASSIUM CHLORIDE 7.45 MG/ML
10 INJECTION INTRAVENOUS
Status: DISPENSED | OUTPATIENT
Start: 2025-03-26 | End: 2025-03-26

## 2025-03-26 RX ADMIN — PAROXETINE HYDROCHLORIDE 40 MG: 20 TABLET, FILM COATED ORAL at 09:04

## 2025-03-26 RX ADMIN — POTASSIUM CHLORIDE 40 MEQ: 750 TABLET, FILM COATED, EXTENDED RELEASE ORAL at 11:25

## 2025-03-26 RX ADMIN — OXYCODONE HYDROCHLORIDE 2.5 MG: 5 TABLET ORAL at 16:22

## 2025-03-26 RX ADMIN — OXYCODONE HYDROCHLORIDE 2.5 MG: 5 TABLET ORAL at 09:21

## 2025-03-26 RX ADMIN — SODIUM CHLORIDE 10 MG: 9 INJECTION INTRAMUSCULAR; INTRAVENOUS; SUBCUTANEOUS at 06:51

## 2025-03-26 RX ADMIN — WATER 5 MG: 1 INJECTION INTRAMUSCULAR; INTRAVENOUS; SUBCUTANEOUS at 06:51

## 2025-03-26 RX ADMIN — LEVOTHYROXINE SODIUM 200 MCG: 0.05 TABLET ORAL at 06:32

## 2025-03-26 RX ADMIN — FENTANYL CITRATE 25 MCG: 50 INJECTION INTRAMUSCULAR; INTRAVENOUS at 18:27

## 2025-03-26 RX ADMIN — ACETAMINOPHEN 650 MG: 325 TABLET ORAL at 11:24

## 2025-03-26 RX ADMIN — POTASSIUM CHLORIDE 10 MEQ: 7.46 INJECTION, SOLUTION INTRAVENOUS at 10:24

## 2025-03-26 RX ADMIN — AZITHROMYCIN MONOHYDRATE 500 MG: 500 INJECTION, POWDER, LYOPHILIZED, FOR SOLUTION INTRAVENOUS at 18:55

## 2025-03-26 RX ADMIN — SODIUM CHLORIDE, PRESERVATIVE FREE 10 ML: 5 INJECTION INTRAVENOUS at 21:00

## 2025-03-26 RX ADMIN — MAGNESIUM SULFATE HEPTAHYDRATE 2000 MG: 40 INJECTION, SOLUTION INTRAVENOUS at 12:12

## 2025-03-26 RX ADMIN — HYDROMORPHONE HYDROCHLORIDE 0.25 MG: 1 INJECTION, SOLUTION INTRAMUSCULAR; INTRAVENOUS; SUBCUTANEOUS at 06:31

## 2025-03-26 RX ADMIN — SODIUM CHLORIDE 10 MG: 9 INJECTION INTRAMUSCULAR; INTRAVENOUS; SUBCUTANEOUS at 18:58

## 2025-03-26 RX ADMIN — PIPERACILLIN AND TAZOBACTAM 3375 MG: 3; .375 INJECTION, POWDER, LYOPHILIZED, FOR SOLUTION INTRAVENOUS at 09:13

## 2025-03-26 RX ADMIN — POTASSIUM PHOSPHATE, MONOBASIC AND POTASSIUM PHOSPHATE, DIBASIC 30 MMOL: 224; 236 INJECTION, SOLUTION, CONCENTRATE INTRAVENOUS at 17:31

## 2025-03-26 RX ADMIN — WATER 5 MG: 1 INJECTION INTRAMUSCULAR; INTRAVENOUS; SUBCUTANEOUS at 18:58

## 2025-03-26 RX ADMIN — SODIUM CHLORIDE, PRESERVATIVE FREE 10 ML: 5 INJECTION INTRAVENOUS at 09:22

## 2025-03-26 RX ADMIN — SODIUM CHLORIDE 5 ML/HR: 0.9 INJECTION, SOLUTION INTRAVENOUS at 09:11

## 2025-03-26 RX ADMIN — FENTANYL CITRATE 50 MCG: 50 INJECTION INTRAMUSCULAR; INTRAVENOUS at 18:44

## 2025-03-26 RX ADMIN — SODIUM CHLORIDE: 0.9 INJECTION, SOLUTION INTRAVENOUS at 01:02

## 2025-03-26 RX ADMIN — PIPERACILLIN AND TAZOBACTAM 3375 MG: 3; .375 INJECTION, POWDER, LYOPHILIZED, FOR SOLUTION INTRAVENOUS at 17:27

## 2025-03-26 RX ADMIN — POTASSIUM CHLORIDE 10 MEQ: 7.46 INJECTION, SOLUTION INTRAVENOUS at 09:21

## 2025-03-26 RX ADMIN — PIPERACILLIN AND TAZOBACTAM 3375 MG: 3; .375 INJECTION, POWDER, LYOPHILIZED, FOR SOLUTION INTRAVENOUS at 01:03

## 2025-03-26 RX ADMIN — ENOXAPARIN SODIUM 40 MG: 100 INJECTION SUBCUTANEOUS at 10:32

## 2025-03-26 ASSESSMENT — PAIN DESCRIPTION - ORIENTATION
ORIENTATION: LEFT
ORIENTATION: LEFT;POSTERIOR
ORIENTATION: LEFT

## 2025-03-26 ASSESSMENT — PAIN SCALES - GENERAL
PAINLEVEL_OUTOF10: 7
PAINLEVEL_OUTOF10: 3
PAINLEVEL_OUTOF10: 7
PAINLEVEL_OUTOF10: 0
PAINLEVEL_OUTOF10: 3
PAINLEVEL_OUTOF10: 7
PAINLEVEL_OUTOF10: 5
PAINLEVEL_OUTOF10: 8
PAINLEVEL_OUTOF10: 7
PAINLEVEL_OUTOF10: 3

## 2025-03-26 ASSESSMENT — PAIN DESCRIPTION - LOCATION
LOCATION: CHEST

## 2025-03-26 NOTE — CONSULTS
Thoracic Surgery  Consult Note  History and Physical    Chief Complaint: No chief complaint on file.      Inpatient consult to Thoracic Surgery  Consult performed by: Beth Akins PA-C  Consult ordered by: David Dugan MD           Patient Active Problem List   Diagnosis    OA (osteoarthritis) of knee    Acquired hypothyroidism    Medium vessel vasculitis    Hyperlipidemia    Anxiety disorder    Essential hypertension    Attention-deficit hyperactivity disorder, predominantly inattentive type    Varicose veins of lower extremity    Peptic ulcer    Dyspnea    Empyema of lung (HCC)       HPI: Cori Dennis is a 74 y.o. female with past medical history of hypertension, Raynaud's, hypothyroidism, anxiety, ADHD, blindness in right eye, PUD who presented to [facility] ICU from [facility] for dyspnea, fatigue, cough, pleuritic left chest pain.  CT abdomen pelvis at [place] revealed loculated left pleural effusion, region of low-attenuation which is concerning for lung abscess.  She was transferred to [facility] ICU for CCM and thoracic surgery evaluation.  A 14 Sami chest tube was placed in the ICU and tPA instilled.     Past Medical History:   Diagnosis Date    Acquired hypothyroidism 08/06/2020    Anxiety     Attention-deficit hyperactivity disorder, predominantly inattentive type 08/06/2020    Essential hypertension 08/06/2020    Hyperlipidemia 08/06/2020    OA (osteoarthritis) of knee 06/06/2014    Peptic ulcer 08/06/2020    Raynaud's disease     Varicose veins of lower extremity 08/06/2020       Past Surgical History:   Procedure Laterality Date    COLONOSCOPY  07/13/2006    GASTRIC BYPASS SURGERY      GI      ORTHOPEDIC SURGERY      Right knee    TONSILLECTOMY      TOTAL KNEE ARTHROPLASTY Right 01/2007       Current Facility-Administered Medications   Medication Dose Route Frequency Provider Last Rate Last Admin    potassium chloride 10 mEq/100 mL IVPB (Peripheral Line)  10 mEq IntraVENous

## 2025-03-26 NOTE — PROGRESS NOTES
CRITICAL CARE NOTE    Name: Cori Dennis   : 1950   MRN: [id number]   Date: 3/26/2025      Reason for ICU Admission: Complex L pleural effusion, lung abscess     ICU PROBLEM LIST   Acute hypoxic respiratory failure  Complex L pleural effusion  CAP, Lung abscess   HTN  Hypothyroidism  REM sleep disorder  OA  Raynaulds  ADHD  Anxiety  Blind in R eye    HISTORY OF PRESENT ILLNESS:   Pt is a 73yo F w/ PMH as noted above who presented to [facility] ICU from [facility] for HLOC. Pt presented to OSH due to 2 weeks of progressive fatigue, lethargy, and dyspnea w/ she states began after possible aspiration event and URI like symptoms w/ related non-productive cough and pleuritic pain. No sick contacts or other exposures. Workup at OSH significant for complex L pleural effusion and concern for L lung abscess. [facility] thoracic surgery and CCM consulted and pt transferred to [facility].    On arrival, pt NAD, VSS, on O2 per NC.    Past 24hrs:  L pigtail chest tube placed w/ lytic instillation, 220ml out clear yellow output. Lights criteria c/w exudative effusion, likely 2/2 lung abscess. No bacteria on Gm stain, Cx NGTD thus far       NEUROLOGICAL:    PRN pain regimen  Delirium precautions  Home paxil    PULMONOLOGY:   Exudative effusion, follow cytology, Cx results  L chest tube to suction  Monitor chest tube output, repeat lytics   O2 per NC for spO2 92-98%  PRN bronchodilators  Abx as below  Thoracic surgery consulted    CARDIOVASCULAR:   MAP goal >65    GASTROINTESTINAL:   Diet as tolerated     RENAL/ELECTROLYTE/FLUIDS:   Strict I/Os  Monitor renal labs  Mg/Phos/K >2/3/4    ENDOCRINE:   Goal euglycemia  Home synthroid     HEMATOLOGY/ONCOLOGY:   SCDs, lovenox     ID/MICRO:   Bcx 3/24 from OSH NGTD  Follow pleural fluid Cx  Zosyn and azithromycin for CAP/atypical coverage       ICU DAILY CHECKLIST     Code Status:Full  DVT Prophylaxis:SCDs, lovenox  T/L/D: PIV  SUP: NA  Diet: as tolerated  Activity Level:ad

## 2025-03-26 NOTE — PROGRESS NOTES
Spiritual Health History and Assessment/Progress Note  [facility]    Advance Care Planning,  ,  ,      Name: Cori Dennis MRN: [id number]    Age: 74 y.o.     Sex: female   Language: English   Jehovah's witness: Other   Empyema of lung (HCC)     Date: 3/26/2025            Total Time Calculated: 65 min              Spiritual Assessment began in [facility] 7S2 INTENSIVE CARE        Referral/Consult From: Multi-disciplinary team   Encounter Overview/Reason: Advance Care Planning  Service Provided For: Patient    Nemo, Belief, Meaning:   Patient has beliefs or practices that help with coping during difficult times  Family/Friends No family/friends present      Importance and Influence:  Patient has no beliefs influential to healthcare decision-making identified during this visit  Family/Friends No family/friends present    Community:  Patient feels well-supported. Support system includes: Friends and Extended family  Family/Friends No family/friends present    Assessment and Plan of Care:     Patient Interventions include: Facilitated expression of thoughts and feelings, Affirmed coping skills/support systems, and Assisted in Advance Care Planning conversation  Family/Friends Interventions include: No family/friends present    Patient Plan of Care: Spiritual Care available upon further referral  Family/Friends Plan of Care: No family/friends present    I visited with patient Cori per consult from CM (Galina).   Cori was receptive to the visit and appears to be spiritually coping appropriately.  Completed AMD and updated chart with new contact.   Spiritual care remains available upon request.    Electronically signed by Zach Orta, Chaplain Resident on 3/26/2025 at 10:51 AM

## 2025-03-26 NOTE — PLAN OF CARE
Problem: Occupational Therapy - Adult  Goal: By Discharge: Performs self-care activities at highest level of function for planned discharge setting.  See evaluation for individualized goals.  Description: FUNCTIONAL STATUS PRIOR TO ADMISSION:  Patient was ambulatory using no DME  Receives Help From: Family, Prior Level of Assist for ADLs: Independent,  ,  ,  ,  ,  , Prior Level of Assist for Homemaking: Independent, Ambulation Assistance: Independent, Prior Level of Assist for Transfers: Independent, Active : Yes     HOME SUPPORT: Pt reports living alone and being active with ADLS/IADLS without the use of DME    Occupational Therapy Goals:  Initiated 3/26/2025  1.  Patient will perform grooming with Contact Guard Assist within 7 day(s).  2.  Patient will perform lower body dressing with Minimal Assist within 7 day(s).  3.  Patient will perform bathing with Minimal Assist within 7 day(s).  4.  Patient will perform toilet transfers with Contact Guard Assist  within 7 day(s).  5.  Patient will perform all aspects of toileting with Supervision within 7 day(s).  6.  Patient will participate in upper extremity therapeutic exercise/activities with Supervision for 15 minutes within 7 day(s).    7.  Patient will utilize energy conservation techniques during functional activities with verbal cues within 7 day(s).   Outcome: Progressing   OCCUPATIONAL THERAPY EVALUATION    Patient: Cori Dennis (74 y.o. female)  Date: 3/26/2025  Primary Diagnosis: Empyema (HCC) [J86.9]  Empyema of lung (HCC) [J86.9]         Precautions:                    ASSESSMENT :  The patient is limited by decreased functional mobility, independence in ADLs, high-level IADLs, strength, body mechanics, activity tolerance, endurance, safety awareness, coordination, balance, vision/visual deficit, posture s/p admission for acute hypoxic respiratory failure, Complex L pleural effusion. Prior to admission pt reports living alone and being

## 2025-03-26 NOTE — CONSULTS
DISPENSING    Automatic Reconciliation, Ar     a  Allergies   Allergen Reactions    Codeine Rash          Subjective:         REVIEW OF SYSTEMS:     Total of 12 systems reviewed as follows:   I am not able to complete the review of systems because:   The patient is intubated and sedated    The patient has altered mental status due to his acute medical problems    The patient has baseline aphasia from prior stroke(s)    The patient has baseline dementia and is not reliable historian                 POSITIVE= underlined text  Negative = text not underlined  General:  fever, chills, sweats, generalized weakness, weight loss/gain,      loss of appetite   Eyes:    blurred vision, eye pain, loss of vision, double vision  ENT:    rhinorrhea, pharyngitis   Respiratory:   Non productive cough, sputum production, SOB, wheezing, [place], pleuritic pain   Cardiology:   chest pain, palpitations, orthopnea, PND, edema, syncope   Gastrointestinal:  abdominal pain , N/V, dysphagia, diarrhea, constipation, bleeding   Genitourinary:  frequency, urgency, dysuria, hematuria, incontinence   Muskuloskeletal :  arthralgia, myalgia   Hematology:  easy bruising, nose or gum bleeding, lymphadenopathy   Dermatological: rash, ulceration, pruritis   Endocrine:   hot flashes or polydipsia   Neurological:  headache, dizziness, confusion, focal weakness, paresthesia,     Speech difficulties, memory loss, gait disturbance  Psychological: Feelings of anxiety, depression, agitation    Objective:     Visit Vitals  BP 95/75   Pulse (!) 111   Temp 98.8 °F (37.1 °C) (Oral)   Resp 22   Wt 55.1 kg (121 lb 7.6 oz)   SpO2 100%   BMI 20.21 kg/m²     Temp (24hrs), Av.3 °F (36.8 °C), Min:97.7 °F (36.5 °C), Max:98.8 °F (37.1 °C)       Lines:  peripheral    PHYSICAL EXAM:   General:    Ill appearing. Alert, cooperative, no distress, appears stated age.     HEENT: Atraumatic, anicteric sclerae, pink conjunctivae     No oral ulcers, mucosa moist, throat  AKIL Shi - NP     March 26, 2025      A total time of 60 minutes was spent on today's encounter.  Greater than 50% of the time was spent on the following:  Preparing for visit and chart review.  Obtaining and/or reviewing separately obtained history  Performing a medically appropriate exam and/or evaluation  Counseling and educating a patient/family/caregiver as noted above  Referring and communicating with other professionals (not separately reported)  Independently interpreting results (not separately reported) and communicating results to the patient/family/caregiver  Care coordination (not separately reported) as noted above  Documenting clinical information in the electronic health records (e.g. problem list, visit note) on the day of the encounter

## 2025-03-26 NOTE — PROCEDURES
PROCEDURE NOTE  Date: 3/26/2025   Name: Cori Dennis  YOB: 1950    Chest Tube Insertion    Date/Time: 3/26/2025 6:53 PM    Performed by: David Dugan MD  Authorized by: David Dugan MD  Consent: Verbal consent obtained.  Risks and benefits: risks, benefits and alternatives were discussed  Consent given by: patient  Patient identity confirmed: verbally with patient  Time out: Immediately prior to procedure a \"time out\" was called to verify the correct patient, procedure, equipment, support staff and site/side marked as required.  Indications: pleural effusion    Sedation:  Patient sedated: no      Anesthesia:  Local Anesthetic: lidocaine 1% with epinephrine  Preparation: sterile field established and skin prepped with chlorhexidine  Placement location: left lateral  Scalpel size: 10  Tube size (Kyrgyz): 14.  Ultrasound guidance: yes  Tension pneumothorax heard: no  Tube connected to: suction  Drainage characteristics: yellow and clear  Drainage amount: 400 ml  Suture material: 0 silk  Dressing: 4x4 sterile gauze  Post-insertion x-ray findings: tube in good position  Patient tolerance: patient tolerated the procedure well with no immediate complications

## 2025-03-26 NOTE — PLAN OF CARE
Problem: Physical Therapy - Adult  Goal: By Discharge: Performs mobility at highest level of function for planned discharge setting.  See evaluation for individualized goals.  Description: FUNCTIONAL STATUS PRIOR TO ADMISSION: Patient was independent and active without use of DME.  Pt is behavioral therapist and worked part-time PTA.     HOME SUPPORT PRIOR TO ADMISSION: The patient lived alone with no local support.    Physical Therapy Goals  Initiated 3/26/2025  1.  Patient will move from supine to sit and sit to supine in bed with independence within 7 day(s).    2.  Patient will perform sit to stand with independence within 7 day(s).  3.  Patient will transfer from bed to chair and chair to bed with independence using the least restrictive device within 7 day(s).  4.  Patient will ambulate with supervision/set-up for 300 feet with the least restrictive device within 7 day(s).   5.  Patient will ascend/descend 5 stairs with B handrail(s) with supervision/set-up within 7 day(s).   Outcome: Progressing   PHYSICAL THERAPY EVALUATION    Patient: Cori Dennis (74 y.o. female)  Date: 3/26/2025  Primary Diagnosis: Empyema (HCC) [J86.9]  Empyema of lung (HCC) [J86.9]       Precautions: Restrictions/Precautions  Restrictions/Precautions: Fall Risk (CT to continuous suction)            ASSESSMENT :   Patient is a 73 y/o female presenting at a transfer from [facility] for management of L pleural effusion and L lung abscess, now s/p L pigtail CT placement 3/25.  Patient seen for PT evaluation with fair tolerance to PT intervention, able to tolerate transition upright OOBTC, but now presents at a decline from her functional baseline.  Patient currently requiring Min-Vandana of 2 for all mobility and will benefit from ongoing acute PT intervention to further address functional deficits, maximize functioning, and progress independence throughout acute care stay.  Pt was previously independent and active.  Anticipate as acute

## 2025-03-26 NOTE — CARE COORDINATION
03/26/25 0936   Readmission Assessment   Number of Days since last admission? 1-7 days  (Transferred from [facility])   Previous Disposition Other (comment)  (NA)   Who is being Interviewed Patient   What was the patient's/caregiver's perception as to why they think they needed to return back to the hospital? Other (Comment)  (NA)   Did you visit your Primary Care Physician after you left the hospital, before you returned this time? No   Why weren't you able to visit your PCP? Other (Comment)  (NA)   Did you see a specialist, such as Cardiac, Pulmonary, Orthopedic Physician, etc. after you left the hospital? No   Who advised the patient to return to the hospital? Other (Comment)  (Transfer for higher level of care)   In our efforts to provide the best possible care to you and others like you, can you think of anything that we could have done to help you after you left the hospital the first time, so that you might not have needed to return so soon? Other (Comment)  (Patient transferred for  higher level of care)

## 2025-03-26 NOTE — PROGRESS NOTES
Alteplase/Dornase     Discussed and educated patient on procedure.  Dilaudid 0.25 mg IV x 1 given prior.   Chest tube clamped.  Cleaned stopcock with alcohol swab.    Alteplase 10 mg/30ml and Dornase nicholas 5 mg/30ml sequentially instilled into Left pleural cavity via tube thoracostomy. 20cc flushes of saline before, in between, and after.   Instructions given to nursing to reposition pt q 15 minutes, and unclamp tube after 1 hour.  Pt tolerated procedure well.     120cc of fluid total instilled through chest tube.    Rodolfo Ledesma, NP-C

## 2025-03-26 NOTE — CARE COORDINATION
Care Management Initial Assessment       RUR: 15%   Readmission? No, Transfer from [facility]   1st IM letter given? No     03/26/25 0903   Service Assessment   Patient Orientation Alert and Oriented;Person;Place;Situation;Self   Cognition Alert   History Provided By Patient   Primary Caregiver Self   Support Systems Family Members  (Cousin Antonia Givens [phone])   PCP Verified by CM Yes  (Verna BARNARD)   Last Visit to PCP Within last year   Prior Functional Level Independent in ADLs/IADLs   Current Functional Level Assistance with the following:;Bathing;Dressing;Toileting;Mobility   Can patient return to prior living arrangement Unknown at present   Ability to make needs known: Good   Family able to assist with home care needs: Other (comment)  (Unknown)   Would you like for me to discuss the discharge plan with any other family members/significant others, and if so, who? No   Financial Resources Medicare  (Medicare A, B with [employer] secondary, family needs to bring in card)   Community Resources None   Social/Functional History   Lives With Alone   Type of Home House   Home Layout One level   Home Access Stairs to enter with rails   Entrance Stairs - Number of Steps 5   Entrance Stairs - Rails Both   Bathroom Shower/Tub Walk-in shower   Bathroom Toilet Standard   Bathroom Equipment Built-in shower seat   Home Equipment None   Receives Help From Family   Prior Level of Assist for ADLs Independent   Prior Level of Assist for Homemaking Independent   Homemaking Responsibilities Yes   Ambulation Assistance Independent   Prior Level of Assist for Transfers Independent   Active  Yes   Mode of Transportation Car   Occupation Part time employment   Type of Occupation Behavioral Therapist   Discharge Planning   Living Arrangements Alone   Current Services Prior To Admission None   Potential Assistance Purchasing Medications No   History of falls? 0     Patient presented to [facility]

## 2025-03-26 NOTE — ACP (ADVANCE CARE PLANNING)
Advance Care Planning     Advance Care Planning Inpatient Note  Spiritual Care Department    Today's Date: 3/26/2025  Unit: [facility] 7S2 INTENSIVE CARE    Received request from IDT Member.  Upon review of chart and communication with care team, patient's decision making abilities are not in question.. Patient was/were present in the room during visit.    Goals of ACP Conversation:  Discuss advance care planning documents  Facilitate a discussion related to patient's goals of care as they align with the patient's values and beliefs.    Health Care Decision Makers:      Primary Decision Maker: Antonia Givens - Other - [phone]    Secondary Decision Maker: Sarah Beck - Friend - [phone]  Summary:  Completed New Documents  Updated Healthcare Decision Maker    Advance Care Planning Documents (Patient Wishes):  Healthcare Power of /Advance Directive Appointment of Health Care Agent  Living Will/Advance Directive  Anatomical Gift/Organ Donation     Assessment:  I visited with patient Cori per consult from CM (Galina).  Cori was alert and oriented. She expressed her clear wishes for changing her decision makers away from next of kin.   I facilitated a conversation, specifically around end of life care. Cori asked me to read the prompts on the AMD thoroughly.   She draws support from her cousin (Antonia) who is more like a sister to her.   I assessed Cori to be spiritually coping with her illness, and maintains hope that she will be able to return to her life.  Spiritual care is available upon request.      Interventions:  Provided education on documents for clarity and greater understanding  Discussed and provided education on state decision maker hierarchy  Assisted in the completion of documents according to patient's wishes at this time    Care Preferences Communicated:   No    Outcomes/Plan:  ACP Discussion: Completed  New advance directive completed.  Existing advance directive reviewed with

## 2025-03-27 ENCOUNTER — APPOINTMENT (OUTPATIENT)
Facility: HOSPITAL | Age: 75
DRG: 193 | End: 2025-03-27
Attending: STUDENT IN AN ORGANIZED HEALTH CARE EDUCATION/TRAINING PROGRAM
Payer: MEDICARE

## 2025-03-27 PROBLEM — B37.0 ORAL CANDIDIASIS: Status: ACTIVE | Noted: 2025-03-27

## 2025-03-27 PROBLEM — J90 PLEURAL EFFUSION ON LEFT: Status: ACTIVE | Noted: 2025-03-27

## 2025-03-27 LAB
ALBUMIN SERPL-MCNC: 1.6 G/DL (ref 3.5–5)
ALBUMIN/GLOB SERPL: 0.4 (ref 1.1–2.2)
ALP SERPL-CCNC: 162 U/L (ref 45–117)
ALT SERPL-CCNC: 10 U/L (ref 12–78)
ANION GAP SERPL CALC-SCNC: 6 MMOL/L (ref 2–12)
AST SERPL-CCNC: 12 U/L (ref 15–37)
BACTERIA SPEC CULT: ABNORMAL
BASOPHILS # BLD: 0 K/UL (ref 0–0.1)
BASOPHILS NFR BLD: 0 % (ref 0–1)
BILIRUB SERPL-MCNC: 0.5 MG/DL (ref 0.2–1)
BUN SERPL-MCNC: 18 MG/DL (ref 6–20)
BUN/CREAT SERPL: 32 (ref 12–20)
CALCIUM SERPL-MCNC: 8.3 MG/DL (ref 8.5–10.1)
CHLORIDE SERPL-SCNC: 105 MMOL/L (ref 97–108)
CO2 SERPL-SCNC: 23 MMOL/L (ref 21–32)
CREAT SERPL-MCNC: 0.56 MG/DL (ref 0.55–1.02)
DIFFERENTIAL METHOD BLD: ABNORMAL
EOSINOPHIL # BLD: 0.32 K/UL (ref 0–0.4)
EOSINOPHIL NFR BLD: 2 % (ref 0–7)
ERYTHROCYTE [DISTWIDTH] IN BLOOD BY AUTOMATED COUNT: 15.3 % (ref 11.5–14.5)
GLOBULIN SER CALC-MCNC: 4.1 G/DL (ref 2–4)
GLUCOSE SERPL-MCNC: 78 MG/DL (ref 65–100)
HCT VFR BLD AUTO: 27.5 % (ref 35–47)
HGB BLD-MCNC: 8.5 G/DL (ref 11.5–16)
IMM GRANULOCYTES # BLD AUTO: 0 K/UL
IMM GRANULOCYTES NFR BLD AUTO: 0 %
LYMPHOCYTES # BLD: 1.44 K/UL (ref 0.8–3.5)
LYMPHOCYTES NFR BLD: 9 % (ref 12–49)
MAGNESIUM SERPL-MCNC: 2.4 MG/DL (ref 1.6–2.4)
MCH RBC QN AUTO: 25.4 PG (ref 26–34)
MCHC RBC AUTO-ENTMCNC: 30.9 G/DL (ref 30–36.5)
MCV RBC AUTO: 82.1 FL (ref 80–99)
MONOCYTES # BLD: 0.64 K/UL (ref 0–1)
MONOCYTES NFR BLD: 4 % (ref 5–13)
NEUTS SEG # BLD: 13.6 K/UL (ref 1.8–8)
NEUTS SEG NFR BLD: 85 % (ref 32–75)
NRBC # BLD: 0 K/UL (ref 0–0.01)
NRBC BLD-RTO: 0 PER 100 WBC
PHOSPHATE SERPL-MCNC: 3.2 MG/DL (ref 2.6–4.7)
PLATELET # BLD AUTO: 347 K/UL (ref 150–400)
PMV BLD AUTO: 9.3 FL (ref 8.9–12.9)
POTASSIUM SERPL-SCNC: 4.4 MMOL/L (ref 3.5–5.1)
PROT SERPL-MCNC: 5.7 G/DL (ref 6.4–8.2)
RBC # BLD AUTO: 3.35 M/UL (ref 3.8–5.2)
RBC MORPH BLD: ABNORMAL
SERVICE CMNT-IMP: ABNORMAL
SODIUM SERPL-SCNC: 134 MMOL/L (ref 136–145)
WBC # BLD AUTO: 16 K/UL (ref 3.6–11)

## 2025-03-27 PROCEDURE — 51798 US URINE CAPACITY MEASURE: CPT

## 2025-03-27 PROCEDURE — 2000000000 HC ICU R&B

## 2025-03-27 PROCEDURE — 2580000003 HC RX 258: Performed by: STUDENT IN AN ORGANIZED HEALTH CARE EDUCATION/TRAINING PROGRAM

## 2025-03-27 PROCEDURE — 71250 CT THORAX DX C-: CPT

## 2025-03-27 PROCEDURE — 83735 ASSAY OF MAGNESIUM: CPT

## 2025-03-27 PROCEDURE — 6370000000 HC RX 637 (ALT 250 FOR IP): Performed by: NURSE PRACTITIONER

## 2025-03-27 PROCEDURE — 85025 COMPLETE CBC W/AUTO DIFF WBC: CPT

## 2025-03-27 PROCEDURE — 2500000003 HC RX 250 WO HCPCS: Performed by: STUDENT IN AN ORGANIZED HEALTH CARE EDUCATION/TRAINING PROGRAM

## 2025-03-27 PROCEDURE — 6360000002 HC RX W HCPCS: Performed by: STUDENT IN AN ORGANIZED HEALTH CARE EDUCATION/TRAINING PROGRAM

## 2025-03-27 PROCEDURE — 97530 THERAPEUTIC ACTIVITIES: CPT

## 2025-03-27 PROCEDURE — 97535 SELF CARE MNGMENT TRAINING: CPT

## 2025-03-27 PROCEDURE — 94760 N-INVAS EAR/PLS OXIMETRY 1: CPT

## 2025-03-27 PROCEDURE — 99232 SBSQ HOSP IP/OBS MODERATE 35: CPT | Performed by: NURSE PRACTITIONER

## 2025-03-27 PROCEDURE — 6370000000 HC RX 637 (ALT 250 FOR IP): Performed by: STUDENT IN AN ORGANIZED HEALTH CARE EDUCATION/TRAINING PROGRAM

## 2025-03-27 PROCEDURE — 51701 INSERT BLADDER CATHETER: CPT

## 2025-03-27 PROCEDURE — 80053 COMPREHEN METABOLIC PANEL: CPT

## 2025-03-27 PROCEDURE — 84100 ASSAY OF PHOSPHORUS: CPT

## 2025-03-27 PROCEDURE — 2700000000 HC OXYGEN THERAPY PER DAY

## 2025-03-27 RX ORDER — NYSTATIN 100000 [USP'U]/ML
5 SUSPENSION ORAL 4 TIMES DAILY
Status: DISCONTINUED | OUTPATIENT
Start: 2025-03-27 | End: 2025-03-30 | Stop reason: HOSPADM

## 2025-03-27 RX ORDER — SENNOSIDES A AND B 8.6 MG/1
1 TABLET, FILM COATED ORAL DAILY
Status: DISCONTINUED | OUTPATIENT
Start: 2025-03-27 | End: 2025-03-30 | Stop reason: HOSPADM

## 2025-03-27 RX ADMIN — SODIUM CHLORIDE, PRESERVATIVE FREE 10 ML: 5 INJECTION INTRAVENOUS at 09:16

## 2025-03-27 RX ADMIN — PIPERACILLIN AND TAZOBACTAM 3375 MG: 3; .375 INJECTION, POWDER, LYOPHILIZED, FOR SOLUTION INTRAVENOUS at 01:45

## 2025-03-27 RX ADMIN — AZITHROMYCIN MONOHYDRATE 500 MG: 500 INJECTION, POWDER, LYOPHILIZED, FOR SOLUTION INTRAVENOUS at 18:12

## 2025-03-27 RX ADMIN — ENOXAPARIN SODIUM 40 MG: 100 INJECTION SUBCUTANEOUS at 09:15

## 2025-03-27 RX ADMIN — NYSTATIN 500000 UNITS: 100000 SUSPENSION ORAL at 16:53

## 2025-03-27 RX ADMIN — LEVOTHYROXINE SODIUM 200 MCG: 0.05 TABLET ORAL at 06:52

## 2025-03-27 RX ADMIN — NYSTATIN 500000 UNITS: 100000 SUSPENSION ORAL at 21:00

## 2025-03-27 RX ADMIN — SENNOSIDES 8.6 MG: 8.6 TABLET, FILM COATED ORAL at 12:49

## 2025-03-27 RX ADMIN — WATER 5 MG: 1 INJECTION INTRAMUSCULAR; INTRAVENOUS; SUBCUTANEOUS at 11:15

## 2025-03-27 RX ADMIN — IBUPROFEN 600 MG: 400 TABLET, FILM COATED ORAL at 09:37

## 2025-03-27 RX ADMIN — NYSTATIN 500000 UNITS: 100000 SUSPENSION ORAL at 12:49

## 2025-03-27 RX ADMIN — SODIUM CHLORIDE 10 MG: 9 INJECTION INTRAMUSCULAR; INTRAVENOUS; SUBCUTANEOUS at 11:15

## 2025-03-27 RX ADMIN — ONDANSETRON 4 MG: 2 INJECTION, SOLUTION INTRAMUSCULAR; INTRAVENOUS at 04:58

## 2025-03-27 RX ADMIN — PIPERACILLIN AND TAZOBACTAM 3375 MG: 3; .375 INJECTION, POWDER, LYOPHILIZED, FOR SOLUTION INTRAVENOUS at 09:18

## 2025-03-27 RX ADMIN — PAROXETINE HYDROCHLORIDE 40 MG: 20 TABLET, FILM COATED ORAL at 09:15

## 2025-03-27 RX ADMIN — SODIUM CHLORIDE, PRESERVATIVE FREE 10 ML: 5 INJECTION INTRAVENOUS at 21:00

## 2025-03-27 RX ADMIN — SODIUM CHLORIDE, PRESERVATIVE FREE 10 ML: 5 INJECTION INTRAVENOUS at 09:15

## 2025-03-27 RX ADMIN — PIPERACILLIN AND TAZOBACTAM 3375 MG: 3; .375 INJECTION, POWDER, LYOPHILIZED, FOR SOLUTION INTRAVENOUS at 16:50

## 2025-03-27 ASSESSMENT — PAIN DESCRIPTION - LOCATION: LOCATION: ABDOMEN

## 2025-03-27 ASSESSMENT — PAIN SCALES - GENERAL
PAINLEVEL_OUTOF10: 0
PAINLEVEL_OUTOF10: 4
PAINLEVEL_OUTOF10: 0
PAINLEVEL_OUTOF10: 0

## 2025-03-27 ASSESSMENT — PAIN DESCRIPTION - ORIENTATION: ORIENTATION: LEFT;OUTER

## 2025-03-27 NOTE — PROGRESS NOTES
Infectious Disease Progress     Impression:   Loculated left pleural effusion   Hx of tobacco abuse  Acute respiratory failure due to hypoxia and ? CAP  leukocytosis  - afebrile, wbc 17->16    Procal 0.34    U/A (-)    MRSA nare screen +    Pleural fluid cell count (3/25) 4301 nucleated cells with 65% polys. Cx- no growth so far    Blood cx (3/24) no growth so far    Oral candidiasis   - start on nystatin swish and spit   Plan:     - Stopped IV azithromycin     Add vancomycin in presence of + MRSA and persistent leukocytosis.     Pharmacy to dose per creatinine clearance.     Continue with IV zosyn; waiting for pleural fluid sensitivity       Nystatin swish and spit qid x 10 days for oral candidiasis.     Plan of care d/w pt, clinical pharmacist, and Dr. Sidhu               History of Present Illness   3/26/2025  Patient is a 74 y.o. female with medical history of lung abscess, hypertension, hypothyroidism, Raynaulds, ADHD, anxiety, blind in R eye, REM sleep disorder, gastric bypass, right knee surgery in the past, and acute hypoxic respiratory failure was transferred to [facility] on 3/25 to further evaluate lung abscess. Pt was admitted to ICU, underwent for chest tube insertion upon arrival.     In ER, temp was 99, wbc 23.8, COVID 19/influenza (-), fluid cx revealed wbc from gram stain, blood cx with no growth so far, and U/A (-). Pt received IV azithromycin, vancomycin, and zosyn. Pt is currently on azithromycin and zosyn.     CT abd/pel revealed  Loculated left pleural effusion with partial collapse of the left lung. In  the left infrahilar region there is low attenuation within the collapsed lung and underlying mass lesion is difficult to exclude. Large volume of stool within the colon.     During visit, pt is c/o chest congestion, thick mucus but unable to cough up. C/o chest tube site discomfort. No fever, chills, sob, lizarraga, chest pain, abd pain, diarrhea, or dysuria.  Pt informed chocking sensation after eating

## 2025-03-27 NOTE — PROGRESS NOTES
Physician Progress Note      PATIENT:               JAZ ROBERTS  [facility] #:                  [id number]  :                       1950  ADMIT DATE:       3/25/2025 4:32 PM  DISCH DATE:  RESPONDING  PROVIDER #:        David Dugan MD          QUERY TEXT:    Pt admitted with pna, lung abscess. Pt noted to have wbc 29, hr 111, resp   failure. If possible, please document in the progress notes and discharge   summary if you are evaluating and /or treating any of the following:  The medical record reflects the following:  Risk Factors: CAP, lung abscess, complex pleural effusion  Clinical Indicators: 27pn-Fluid c/w exudative effusion 2/2 PNA/lung abscess,   wbc 29-17, procal 0.34, hr 111 , 26 pn- presenting with concern for sepsis   found to have left pneumonia with parapneumonic effusion.  Treatment: chest tube, Zosyn and azithromycin  Options provided:  -- Sepsis, present on admission  -- pna without Sepsis  -- Sepsis was ruled out  -- Other - I will add my own diagnosis  -- Disagree - Not applicable / Not valid  -- Disagree - Clinically unable to determine / Unknown  -- Refer to Clinical Documentation Reviewer    PROVIDER RESPONSE TEXT:    This patient has pna without Sepsis.    Query created by: Alicia Villaseñor on 3/27/2025 11:48 AM      QUERY TEXT:    Patient admitted with complex left pl effusion. Noted documentation of acute   respiratory failure in H&P. In order to support the diagnosis of acute   respiratory failure, please include additional clinical indicators in your   documentation.  Or please document if the diagnosis of acute respiratory   failure has been ruled out after further study.    The medical record reflects the following:  Risk Factors: omplex L pleural effusion, CAP, Lung abscess  Clinical Indicators: H&P- 2 weeks of progressive fatigue, lethargy, and   dyspnea w/ she states began after possible aspiration event and URI like   symptoms w/ related non-productive cough and pleuritic

## 2025-03-27 NOTE — PROGRESS NOTES
CRITICAL CARE NOTE    Name: Cori Dennis   : 1950   MRN: [id number]   Date: 3/27/2025      Reason for ICU Admission: Complex L pleural effusion, lung abscess     ICU PROBLEM LIST   Acute hypoxic respiratory failure  Complex L pleural effusion  CAP, Lung abscess   HTN  Hypothyroidism  REM sleep disorder  OA  Raynaulds  ADHD  Anxiety  Blind in R eye    HISTORY OF PRESENT ILLNESS:   Pt is a 73yo F w/ PMH as noted above who presented to [facility] ICU from [facility] for HLOC. Pt presented to OSH due to 2 weeks of progressive fatigue, lethargy, and dyspnea w/ she states began after possible aspiration event and URI like symptoms w/ related non-productive cough and pleuritic pain. No sick contacts or other exposures. Workup at OSH significant for complex L pleural effusion and concern for L lung abscess. [facility] thoracic surgery and CCM consulted and pt transferred to [facility].    On arrival, pt NAD, VSS, on O2 per NC. L pigtail chest tube placed w/ lytic instillation. Fluid c/w exudative effusion 2/2 PNA/lung abscess    Past 24hrs:  Chest tube dislodged yesterday, replaced w/ >1L output w/ lytic instillation. Pain at insertion site. CT chest w/ improving effusion and resolving complex features. Plan for one more dose of lytics, then likely chest tube removal this PM.     Possible transfer out of ICU this PM post chest tube removal.       NEUROLOGICAL:    PRN pain regimen  Delirium precautions  Home paxil    PULMONOLOGY:   Exudative effusion, follow cytology, Cx results  L chest tube to suction  Monitor chest tube output, repeat lytics   CT imaging w/ improvement  Likely remove chest tube later this PM after morning lytic dose  O2 per NC for spO2 92-98%  PRN bronchodilators  Abx as below  Thoracic surgery following, no acute intervention indicated     CARDIOVASCULAR:   MAP goal >65    GASTROINTESTINAL:   Diet as tolerated     RENAL/ELECTROLYTE/FLUIDS:   Strict I/Os  Monitor renal labs  Mg/Phos/K  >2/3/4    ENDOCRINE:   Goal euglycemia  Home synthroid     HEMATOLOGY/ONCOLOGY:   SCDs, lovenox     ID/MICRO:   Bcx 3/24 from OSH NGTD  Follow pleural fluid Cx  Zosyn and azithromycin for CAP/atypical coverage       ICU DAILY CHECKLIST     Code Status:Full  DVT Prophylaxis:SCDs, lovenox  T/L/D: PIV  SUP: NA  Diet: as tolerated  Activity Level:ad ileana  ABCDEF Bundle/Checklist Completed:Yes  Disposition: Possible transfer this PM  Multidisciplinary Rounds Completed:  yes  Patient/Family Updated: Yes      Review of Systems:   Negative except as noted above    OBJECTIVE:     Labs and Data: Reviewed 25  Medications: Reviewed 25  Imaging: Reviewed 25    BP 94/65   Pulse 90   Temp 98.4 °F (36.9 °C) (Oral)   Resp 19   Ht 1.651 m (5' 5\")   Wt 60 kg (132 lb 4.4 oz)   SpO2 96%   BMI 22.01 kg/m²      Temp (24hrs), Av.3 °F (36.8 °C), Min:98.1 °F (36.7 °C), Max:98.5 °F (36.9 °C)           Physical Exam  Vitals and nursing note reviewed.   Constitutional:       General: She is not in acute distress.     Appearance: Normal appearance. She is normal weight.   HENT:      Head: Normocephalic and atraumatic.      Nose: Nose normal. No congestion.      Mouth/Throat:      Mouth: Mucous membranes are moist.      Pharynx: Oropharynx is clear. No oropharyngeal exudate.   Eyes:      General: No scleral icterus.     Extraocular Movements: Extraocular movements intact.      Conjunctiva/sclera: Conjunctivae normal.      Pupils: Pupils are equal, round, and reactive to light.   Cardiovascular:      Rate and Rhythm: Normal rate and regular rhythm.      Pulses: Normal pulses.      Heart sounds: Normal heart sounds. No murmur heard.     No friction rub.   Pulmonary:      Effort: Pulmonary effort is normal. No respiratory distress.      Comments: Diminished LLL, L chest tube, serosanguinous output  Abdominal:      General: Abdomen is flat. Bowel sounds are normal. There is no distension.      Palpations: Abdomen is soft.

## 2025-03-27 NOTE — CARE COORDINATION
Transition of Care Plan:    RUR: 12 %   Prior Level of Functioning: Independent   Disposition: Home with HH  DENISE:   Follow up appointments: PCP, ID Thoracic surgery   DME needed: TBD  Transportation at discharge: Family/Friends   IM/IMM Medicare/ letter given: 3/26  Is patient a  and connected with VA? No  Caregiver Contact: Cousin Antonia Givens [phone]  Discharge Caregiver contacted prior to discharge? No  Care Conference needed? No  Barriers to discharge:    CT abd/pel revealed  Loculated left pleural effusion with partial collapse of the left lung.   Chest tube placed   Therapy is recommending Home with HH.  CM met with patient to discuss transitions of care. Provided a list of agencies, patient lives in [facility]. Patient lives alone, at discharge she plans on staying with a friend while she recuperates.Will follow up for choice.  Galina Mckeon RN,Care Management    07-Sep-2023 10:04

## 2025-03-27 NOTE — PLAN OF CARE
Status: Within Functional Limits  Orientation Level: Oriented X4  Cognition  Overall Cognitive Status: Exceptions  Arousal/Alertness: Appears intact  Following Commands: Appears intact  Attention Span: Attends with cues to redirect;Difficulty dividing attention  Memory: Impaired;Decreased recall of recent events  Safety Judgement: Decreased awareness of need for assistance;Decreased awareness of need for safety  Problem Solving: Impaired;Decreased awareness of errors  Insights: Decreased awareness of deficits  Initiation: Appears intact  Sequencing: Requires cues for some  Cognition Comment: impulsive, mildly confused as compared to yesterday with decreased attention and difficulty with motor planning at times; verbalizing hallucinations in session, but reports hallucinates at baseline (RN aware)    Functional Mobility Training:  Bed Mobility:  Bed Mobility Training  Bed Mobility Training: Yes  Supine to Sit: Minimal assistance  Scooting: Contact guard assistance  Transfers:  Transfer Training  Transfer Training: Yes  Sit to Stand: Minimal assistance  Stand to Sit: Minimal assistance  Bed to Chair: Minimal assistance  Balance:  Balance  Sitting: Intact  Standing: Impaired  Standing - Static: Fair  Standing - Dynamic: Fair;Poor   Ambulation/Gait Training:     Gait  Gait Training: Yes  Overall Level of Assistance: Minimal assistance  Distance (ft): 6 Feet (x2)  Assistive Device: None  Interventions: Verbal cues  Speed/Agustina: Slow;Shuffled  Step Length: Right shortened;Left shortened  Swing Pattern: Left asymmetrical  Gait Abnormalities: Decreased step clearance;Path deviations              Pain Rating:  Reports pain at CT site--tolerable for PT    Activity Tolerance:   Fair  and requires rest breaks    After treatment:   Patient left in no apparent distress sitting up in chair, Call bell within reach, and Bed/ chair alarm activated      COMMUNICATION/EDUCATION:   The patient's plan of care was discussed with:  occupational therapist and registered nurse    Patient Education  Education Given To: Patient  Education Provided: Role of Therapy;Plan of Care;Fall Prevention Strategies;Transfer Training;Mobility Training  Education Provided Comments: importance of upright/OOB activity  Education Method: Verbal  Barriers to Learning: None  Education Outcome: Verbalized understanding;Continued education needed      Aant Gifford PT  Minutes: 23

## 2025-03-27 NOTE — PLAN OF CARE
Problem: Occupational Therapy - Adult  Goal: By Discharge: Performs self-care activities at highest level of function for planned discharge setting.  See evaluation for individualized goals.  Description: FUNCTIONAL STATUS PRIOR TO ADMISSION:  Patient was ambulatory using no DME  Receives Help From: Family, Prior Level of Assist for ADLs: Independent,  ,  ,  ,  ,  , Prior Level of Assist for Homemaking: Independent, Ambulation Assistance: Independent, Prior Level of Assist for Transfers: Independent, Active : Yes     HOME SUPPORT: Pt reports living alone and being active with ADLS/IADLS without the use of DME    Occupational Therapy Goals:  Initiated 3/26/2025  1.  Patient will perform grooming with Contact Guard Assist within 7 day(s).  2.  Patient will perform lower body dressing with Minimal Assist within 7 day(s).  3.  Patient will perform bathing with Minimal Assist within 7 day(s).  4.  Patient will perform toilet transfers with Contact Guard Assist  within 7 day(s).  5.  Patient will perform all aspects of toileting with Supervision within 7 day(s).  6.  Patient will participate in upper extremity therapeutic exercise/activities with Supervision for 15 minutes within 7 day(s).    7.  Patient will utilize energy conservation techniques during functional activities with verbal cues within 7 day(s).   Outcome: Progressing   OCCUPATIONAL THERAPY TREATMENT  Patient: Cori Dennis (74 y.o. female)  Date: 3/27/2025  Primary Diagnosis: Empyema (HCC) [J86.9]  Empyema of lung (HCC) [J86.9]       Precautions: Fall Risk (CT to continuous suction)                Chart, occupational therapy assessment, plan of care, and goals were reviewed.    ASSESSMENT  Patient continues to benefit from skilled OT services and is progressing towards goals. Pt received semi-reclined in bed, agreeable to therapy, cleared by RN. Pt was Min A for bed mobility with fair sitting balance. Min A for sit<>stand and functional mobility

## 2025-03-28 ENCOUNTER — APPOINTMENT (OUTPATIENT)
Facility: HOSPITAL | Age: 75
DRG: 193 | End: 2025-03-28
Attending: STUDENT IN AN ORGANIZED HEALTH CARE EDUCATION/TRAINING PROGRAM
Payer: MEDICARE

## 2025-03-28 LAB
ALBUMIN SERPL-MCNC: 1.5 G/DL (ref 3.5–5)
ALBUMIN/GLOB SERPL: 0.4 (ref 1.1–2.2)
ALP SERPL-CCNC: 152 U/L (ref 45–117)
ALT SERPL-CCNC: 11 U/L (ref 12–78)
ANION GAP SERPL CALC-SCNC: 5 MMOL/L (ref 2–12)
AST SERPL-CCNC: 12 U/L (ref 15–37)
BASOPHILS # BLD: 0.04 K/UL (ref 0–0.1)
BASOPHILS NFR BLD: 0.4 % (ref 0–1)
BILIRUB SERPL-MCNC: 0.5 MG/DL (ref 0.2–1)
BUN SERPL-MCNC: 12 MG/DL (ref 6–20)
BUN/CREAT SERPL: 21 (ref 12–20)
CALCIUM SERPL-MCNC: 8.5 MG/DL (ref 8.5–10.1)
CHLORIDE SERPL-SCNC: 106 MMOL/L (ref 97–108)
CO2 SERPL-SCNC: 25 MMOL/L (ref 21–32)
CREAT SERPL-MCNC: 0.57 MG/DL (ref 0.55–1.02)
DIFFERENTIAL METHOD BLD: ABNORMAL
EOSINOPHIL # BLD: 0.34 K/UL (ref 0–0.4)
EOSINOPHIL NFR BLD: 3.2 % (ref 0–7)
ERYTHROCYTE [DISTWIDTH] IN BLOOD BY AUTOMATED COUNT: 15.2 % (ref 11.5–14.5)
GLOBULIN SER CALC-MCNC: 4.1 G/DL (ref 2–4)
GLUCOSE SERPL-MCNC: 93 MG/DL (ref 65–100)
HCT VFR BLD AUTO: 29.5 % (ref 35–47)
HGB BLD-MCNC: 9.4 G/DL (ref 11.5–16)
IMM GRANULOCYTES # BLD AUTO: 0.15 K/UL (ref 0–0.04)
IMM GRANULOCYTES NFR BLD AUTO: 1.4 % (ref 0–0.5)
LYMPHOCYTES # BLD: 0.72 K/UL (ref 0.8–3.5)
LYMPHOCYTES NFR BLD: 6.9 % (ref 12–49)
MCH RBC QN AUTO: 25.8 PG (ref 26–34)
MCHC RBC AUTO-ENTMCNC: 31.9 G/DL (ref 30–36.5)
MCV RBC AUTO: 81 FL (ref 80–99)
MONOCYTES # BLD: 1.1 K/UL (ref 0–1)
MONOCYTES NFR BLD: 10.5 % (ref 5–13)
NEUTS SEG # BLD: 8.12 K/UL (ref 1.8–8)
NEUTS SEG NFR BLD: 77.6 % (ref 32–75)
NRBC # BLD: 0 K/UL (ref 0–0.01)
NRBC BLD-RTO: 0 PER 100 WBC
PLATELET # BLD AUTO: 384 K/UL (ref 150–400)
POTASSIUM SERPL-SCNC: 4.2 MMOL/L (ref 3.5–5.1)
PROT SERPL-MCNC: 5.6 G/DL (ref 6.4–8.2)
RBC # BLD AUTO: 3.64 M/UL (ref 3.8–5.2)
SODIUM SERPL-SCNC: 136 MMOL/L (ref 136–145)
WBC # BLD AUTO: 10.5 K/UL (ref 3.6–11)

## 2025-03-28 PROCEDURE — 2500000003 HC RX 250 WO HCPCS: Performed by: STUDENT IN AN ORGANIZED HEALTH CARE EDUCATION/TRAINING PROGRAM

## 2025-03-28 PROCEDURE — 2580000003 HC RX 258: Performed by: STUDENT IN AN ORGANIZED HEALTH CARE EDUCATION/TRAINING PROGRAM

## 2025-03-28 PROCEDURE — 71045 X-RAY EXAM CHEST 1 VIEW: CPT

## 2025-03-28 PROCEDURE — 1100000000 HC RM PRIVATE

## 2025-03-28 PROCEDURE — 97535 SELF CARE MNGMENT TRAINING: CPT

## 2025-03-28 PROCEDURE — 6360000002 HC RX W HCPCS: Performed by: STUDENT IN AN ORGANIZED HEALTH CARE EDUCATION/TRAINING PROGRAM

## 2025-03-28 PROCEDURE — 2580000003 HC RX 258: Performed by: NURSE PRACTITIONER

## 2025-03-28 PROCEDURE — 6360000002 HC RX W HCPCS: Performed by: NURSE PRACTITIONER

## 2025-03-28 PROCEDURE — 6370000000 HC RX 637 (ALT 250 FOR IP): Performed by: NURSE PRACTITIONER

## 2025-03-28 PROCEDURE — 6370000000 HC RX 637 (ALT 250 FOR IP): Performed by: STUDENT IN AN ORGANIZED HEALTH CARE EDUCATION/TRAINING PROGRAM

## 2025-03-28 PROCEDURE — 97530 THERAPEUTIC ACTIVITIES: CPT

## 2025-03-28 PROCEDURE — 80053 COMPREHEN METABOLIC PANEL: CPT

## 2025-03-28 PROCEDURE — 99232 SBSQ HOSP IP/OBS MODERATE 35: CPT | Performed by: NURSE PRACTITIONER

## 2025-03-28 PROCEDURE — 85025 COMPLETE CBC W/AUTO DIFF WBC: CPT

## 2025-03-28 RX ADMIN — NYSTATIN 500000 UNITS: 100000 SUSPENSION ORAL at 12:41

## 2025-03-28 RX ADMIN — SODIUM CHLORIDE, PRESERVATIVE FREE 10 ML: 5 INJECTION INTRAVENOUS at 21:30

## 2025-03-28 RX ADMIN — NYSTATIN 500000 UNITS: 100000 SUSPENSION ORAL at 21:30

## 2025-03-28 RX ADMIN — SODIUM CHLORIDE 1500 MG: 0.9 INJECTION, SOLUTION INTRAVENOUS at 12:48

## 2025-03-28 RX ADMIN — PAROXETINE HYDROCHLORIDE 40 MG: 20 TABLET, FILM COATED ORAL at 08:56

## 2025-03-28 RX ADMIN — PIPERACILLIN AND TAZOBACTAM 3375 MG: 3; .375 INJECTION, POWDER, LYOPHILIZED, FOR SOLUTION INTRAVENOUS at 09:05

## 2025-03-28 RX ADMIN — ACETAMINOPHEN 650 MG: 325 TABLET ORAL at 12:41

## 2025-03-28 RX ADMIN — VANCOMYCIN HYDROCHLORIDE 1000 MG: 1 INJECTION, POWDER, LYOPHILIZED, FOR SOLUTION INTRAVENOUS at 22:57

## 2025-03-28 RX ADMIN — LEVOTHYROXINE SODIUM 200 MCG: 0.05 TABLET ORAL at 06:00

## 2025-03-28 RX ADMIN — ENOXAPARIN SODIUM 40 MG: 100 INJECTION SUBCUTANEOUS at 08:57

## 2025-03-28 RX ADMIN — PIPERACILLIN AND TAZOBACTAM 3375 MG: 3; .375 INJECTION, POWDER, LYOPHILIZED, FOR SOLUTION INTRAVENOUS at 16:40

## 2025-03-28 RX ADMIN — NYSTATIN 500000 UNITS: 100000 SUSPENSION ORAL at 16:41

## 2025-03-28 RX ADMIN — NYSTATIN 500000 UNITS: 100000 SUSPENSION ORAL at 08:57

## 2025-03-28 RX ADMIN — IBUPROFEN 600 MG: 400 TABLET, FILM COATED ORAL at 08:56

## 2025-03-28 RX ADMIN — SENNOSIDES 8.6 MG: 8.6 TABLET, FILM COATED ORAL at 08:57

## 2025-03-28 RX ADMIN — PIPERACILLIN AND TAZOBACTAM 3375 MG: 3; .375 INJECTION, POWDER, LYOPHILIZED, FOR SOLUTION INTRAVENOUS at 01:27

## 2025-03-28 ASSESSMENT — PAIN SCALES - GENERAL
PAINLEVEL_OUTOF10: 6
PAINLEVEL_OUTOF10: 0

## 2025-03-28 ASSESSMENT — PAIN DESCRIPTION - LOCATION: LOCATION: RIB CAGE

## 2025-03-28 ASSESSMENT — PAIN DESCRIPTION - ORIENTATION: ORIENTATION: LEFT

## 2025-03-28 ASSESSMENT — PAIN DESCRIPTION - DESCRIPTORS: DESCRIPTORS: ACHING

## 2025-03-28 NOTE — PLAN OF CARE
Problem: Occupational Therapy - Adult  Goal: By Discharge: Performs self-care activities at highest level of function for planned discharge setting.  See evaluation for individualized goals.  Description: FUNCTIONAL STATUS PRIOR TO ADMISSION:  Patient was ambulatory using no DME  Receives Help From: Family, Prior Level of Assist for ADLs: Independent,  ,  ,  ,  ,  , Prior Level of Assist for Homemaking: Independent, Ambulation Assistance: Independent, Prior Level of Assist for Transfers: Independent, Active : Yes     HOME SUPPORT: Pt reports living alone and being active with ADLS/IADLS without the use of DME    Occupational Therapy Goals:  Initiated 3/26/2025  1.  Patient will perform grooming with Contact Guard Assist within 7 day(s).  2.  Patient will perform lower body dressing with Minimal Assist within 7 day(s).  3.  Patient will perform bathing with Minimal Assist within 7 day(s).  4.  Patient will perform toilet transfers with Contact Guard Assist  within 7 day(s).  5.  Patient will perform all aspects of toileting with Supervision within 7 day(s).  6.  Patient will participate in upper extremity therapeutic exercise/activities with Supervision for 15 minutes within 7 day(s).    7.  Patient will utilize energy conservation techniques during functional activities with verbal cues within 7 day(s).   Outcome: Progressing   OCCUPATIONAL THERAPY TREATMENT  Patient: Cori Dennis (74 y.o. female)  Date: 3/28/2025  Primary Diagnosis: Empyema (HCC) [J86.9]  Empyema of lung (HCC) [J86.9]       Precautions: Fall Risk (CT to continuous suction)                Chart, occupational therapy assessment, plan of care, and goals were reviewed.    ASSESSMENT  Patient continues to benefit from skilled OT services and is progressing towards goals. Pt received semi-reclined in bed, agreeable to therapy, cleared by RN. Pt was Min A for bed mobility fair sitting balance able to don sock EOB with CGA. Min A for  need for safety;Impaired;Decreased awareness of need for assistance  Problem Solving: Impaired;Decreased awareness of errors  Insights: Decreased awareness of deficits  Initiation: Appears intact  Sequencing: Requires cues for some  Cognition Comment: waxing and waning confusion in session; ongoing poor insight into deficits and safety    Functional Mobility and Transfers for ADLs:  Bed Mobility:  Bed Mobility Training  Bed Mobility Training: Yes  Supine to Sit: Minimal assistance  Scooting: Contact guard assistance     Transfers:   Transfer Training  Transfer Training: Yes  Sit to Stand: Minimal assistance  Stand to Sit: Minimal assistance  Bed to Chair: Minimal assistance           Balance:     Balance  Sitting: Intact  Standing: Impaired  Standing - Static: Fair  Standing - Dynamic: Fair;Poor      ADL Intervention:        Grooming: .  - Face Washing : Minimal Assistance and Standing at Sink  - Oral Care: Minimal Assistance and Standing at sink  - Washing Hands : Minimal Assistance and Standing at sink    Toileting: .  - Bladder Hygiene : Contact Guard Assistance and Seated         Lower Extremity Dressing: .  - Protective Undergarment : Minimal Assistance          Activity Tolerance:   Fair  and requires rest breaks  Please refer to the flowsheet for vital signs taken during this treatment.    After treatment:   Patient left in no apparent distress sitting up in chair, Call bell within reach, and Bed/ chair alarm activated    COMMUNICATION/EDUCATION:   The patient's plan of care was discussed with: physical therapist and registered nurse         Thank you for this referral.  Valery Manzo OT  Minutes: 28

## 2025-03-28 NOTE — PROGRESS NOTES
Pharmacist Note - Vancomycin Dosing    Consult provided for this 74 y.o. female for indication of aspiration PNA.  Antibiotic regimen(s): vanc + zosyn  Patient on vancomycin PTA? NO   Pregnancy status: N/A    Recent Labs     25  0506 25  0134 25  0540   WBC 17.0* 16.0* 10.5   CREATININE 0.44* 0.56 0.57   BUN 13 18 12     Frequency of BMP: daily x3  Height: 165.1 cm  Weight: 60 kg  Est CrCl: 75-80 ml/min; UO: -- ml/kg/hr  Temp (24hrs), Av.2 °F (36.8 °C), Min:97.9 °F (36.6 °C), Max:98.6 °F (37 °C)    Cultures:  3/25 pleural fluid: NGTD, prelim  3/26 MRSA swab: (+)    MRSA Swab ordered (if applicable)? YES    The plan below is expected to result in a target range of AUC/HUGH 400-600    Therapy will be initiated with a loading dose of 1500 mg IV x 1 to be followed by a maintenance dose of 1000 mg IV every 12 hours.  Pharmacy to follow patient daily and order levels / make dose adjustments as appropriate.      *Vancomycin has been dosed used Bayesian kinetics software to target an AUC/HUGH of 400-600, which provides adequate exposure for an assumed infection due to MRSA with an HUGH of 1 or less while reducing the risk of nephrotoxicity as seen with traditional trough based dosing goals.

## 2025-03-28 NOTE — PROGRESS NOTES
CRITICAL CARE NOTE    Name: Cori Dennis   : 1950   MRN: [id number]   Date: 3/28/2025      Reason for ICU Admission: Complex L pleural effusion, lung abscess     ICU PROBLEM LIST   Acute hypoxic respiratory failure  Complex L pleural effusion  CAP, possible Lung abscess   HTN  Hypothyroidism  REM sleep disorder  OA  Raynaulds  ADHD  Anxiety  Blind in R eye    HISTORY OF PRESENT ILLNESS:   Pt is a 73yo F w/ PMH as noted above who presented to [facility] ICU from [facility] for HLOC. Pt presented to OSH due to 2 weeks of progressive fatigue, lethargy, and dyspnea w/ she states began after possible aspiration event and URI like symptoms w/ related non-productive cough and pleuritic pain. No sick contacts or other exposures. Workup at OSH significant for complex L pleural effusion and concern for L lung abscess. [facility] thoracic surgery and CCM consulted and pt transferred to [facility].    On arrival, pt NAD, VSS, on O2 per NC. L pigtail chest tube placed w/ lytic instillation. Fluid c/w exudative effusion / PNA/lung abscess. Required chest tube replacement 3/26 due to dislodgement of initial chest tube.     Past 24hrs:  No acute events o/n. >500ml output from chest tube on water seal, however appears serous. Plan to remove chest tube, monitor for recurrence.     Transfer out of ICU      NEUROLOGICAL:    PRN pain regimen  Delirium precautions  Home paxil    PULMONOLOGY:   Exudative effusion, follow cytology, Cx results  Remove chest tube, monitor for clinically significant effusion recurrence  CT imaging w/ improvement  O2 per NC for spO2 92-98%  PRN bronchodilators  Abx as below  Thoracic surgery following, no acute intervention indicated     CARDIOVASCULAR:   MAP goal >65    GASTROINTESTINAL:   Diet as tolerated     RENAL/ELECTROLYTE/FLUIDS:   Strict I/Os  Monitor renal labs  Mg/Phos/K >2/3/    ENDOCRINE:   Goal euglycemia  Home synthroid     HEMATOLOGY/ONCOLOGY:   SCDs, lovenox     ID/MICRO:   Bcx 3/24  Normal range of motion and neck supple. No rigidity.      Right lower leg: No edema.      Left lower leg: No edema.   Skin:     General: Skin is warm and dry.      Capillary Refill: Capillary refill takes less than 2 seconds.      Coloration: Skin is not jaundiced.   Neurological:      General: No focal deficit present.      Mental Status: She is alert and oriented to person, place, and time. Mental status is at baseline.      Cranial Nerves: No cranial nerve deficit.   Psychiatric:         Mood and Affect: Mood normal.         Behavior: Behavior normal.             Intake/Output:     Intake/Output Summary (Last 24 hours) at 3/28/2025 0837  Last data filed at 3/28/2025 0700  Gross per 24 hour   Intake 538.39 ml   Output 1930 ml   Net -1391.61 ml       Pertinent labs and imaging reviewed and interpreted as noted above        I personally spent 35 minutes of critical care time.  This is time spent at this critically ill patient's bedside actively involved in patient care as well as the coordination of care.  This does not include any procedural time which has been billed separately.    David Dugan MD  Internal/Critical Care/Neurocritical Care Medicine  [facility]   Staff IntensivistSt. Louis Behavioral Medicine Institute Critical Care

## 2025-03-28 NOTE — PROGRESS NOTES
Infectious Disease Progress     Impression:   Loculated left pleural effusion   Hx of tobacco abuse  Acute respiratory failure due to hypoxia and ? CAP  leukocytosis  - afebrile, wbc 17->16    Procal 0.34    U/A (-)    MRSA nare screen +    Pleural fluid cell count (3/25) 4301 nucleated cells with 65% polys. Cx- no growth so far    Blood cx (3/24) no growth so far    Oral candidiasis   - start on nystatin swish and spit   Plan:     - continue with IV zosyn and vancomycin in presence of + MRSA     Pharmacy to dose per creatinine clearance.     Recommend to complete total 7 days of abx therapy with PO Augmentin and doxycycline upon discharge, end date 4/3.       Nystatin swish and spit qid x 10 days for oral candidiasis, end date 4/6/2025     Plan of care d/w pt, clinical pharmacist, and Dr. Sidhu  ID team signing off. Please contact us with any questions.             History of Present Illness   3/26/2025  Patient is a 74 y.o. female with medical history of lung abscess, hypertension, hypothyroidism, Raynaulds, ADHD, anxiety, blind in R eye, REM sleep disorder, gastric bypass, right knee surgery in the past, and acute hypoxic respiratory failure was transferred to [facility] on 3/25 to further evaluate lung abscess. Pt was admitted to ICU, underwent for chest tube insertion upon arrival.     In ER, temp was 99, wbc 23.8, COVID 19/influenza (-), fluid cx revealed wbc from gram stain, blood cx with no growth so far, and U/A (-). Pt received IV azithromycin, vancomycin, and zosyn. Pt is currently on azithromycin and zosyn.     CT abd/pel revealed  Loculated left pleural effusion with partial collapse of the left lung. In  the left infrahilar region there is low attenuation within the collapsed lung and underlying mass lesion is difficult to exclude. Large volume of stool within the colon.     During visit, pt is c/o chest congestion, thick mucus but unable to cough up. C/o chest tube site discomfort. No fever, chills, sob,  lesion is difficult to exclude. 2. Large volume of stool within the colon. Electronically signed by Halima Joyce    XR ACUTE ABD SERIES CHEST 1 VW  Result Date: 3/24/2025  EXAM: Supine and upright views of the abdomen and a frontal view of the chest are provided. INDICATION:  Left-sided chest pain decreased lung sounds Reason for exam:->Left-sided chest pain decreased lung sounds FINDINGS: There is a moderate size left pleural effusion obscuring the left lung base with otherwise clear lungs. There is no pulmonary edema, cardiomegaly, midline shift or apparent pneumothorax. Bowel gas pattern is nonobstructive. There is mild to moderate stool. There is no pneumoperitoneum.     1. Left pleural effusion. 2. Mild to moderate stool Electronically signed by BROWN POE      Thank you for the opportunity to participate in the care of this patient.   Please contact with questions or concerns.      The above plan of care that has been discussed and agreed upon by Dr. Sidhu  A total time of 35 minutes was spent on today's encounter.  Greater than 50% of the time was spent on the following:  Preparing for visit and chart review.  Obtaining and/or reviewing separately obtained history  Performing a medically appropriate exam and/or evaluation  Counseling and educating a patient/family/caregiver as noted above  Referring and communicating with other professionals (not separately reported)  Independently interpreting results (not separately reported) and communicating results to the patient/family/caregiver  Care coordination (not separately reported) as noted above  Documenting clinical information in the electronic health records (e.g. problem list, visit note) on the day of the encounter    AKIL Shi NP

## 2025-03-28 NOTE — PLAN OF CARE
Problem: Physical Therapy - Adult  Goal: By Discharge: Performs mobility at highest level of function for planned discharge setting.  See evaluation for individualized goals.  Description: FUNCTIONAL STATUS PRIOR TO ADMISSION: Patient was independent and active without use of DME.  Pt is behavioral therapist and worked part-time PTA.     HOME SUPPORT PRIOR TO ADMISSION: The patient lived alone with no local support.    Physical Therapy Goals  Initiated 3/26/2025  1.  Patient will move from supine to sit and sit to supine in bed with independence within 7 day(s).    2.  Patient will perform sit to stand with independence within 7 day(s).  3.  Patient will transfer from bed to chair and chair to bed with independence using the least restrictive device within 7 day(s).  4.  Patient will ambulate with supervision/set-up for 300 feet with the least restrictive device within 7 day(s).   5.  Patient will ascend/descend 5 stairs with B handrail(s) with supervision/set-up within 7 day(s).   Outcome: Progressing   PHYSICAL THERAPY TREATMENT    Patient: Cori Dennis (74 y.o. female)  Date: 3/28/2025  Diagnosis: Empyema (HCC) [J86.9]  Empyema of lung (HCC) [J86.9] Empyema of lung (HCC)      Precautions: Restrictions/Precautions  Restrictions/Precautions: Fall Risk (CT to continuous suction)            ASSESSMENT:  Patient continues to benefit from skilled PT services and is progressing towards goals. Patient seen for ongoing PT intervention with decreased tolerance to upright activity due to tachycardia with minimal activity.  Patient also with waxing and waning confusion in session, but easily redirectable.  Continues to require overall Vandana for all mobility and unable to progress ambulation due to HR increase with transfers and standing activities.  Able to remain up OOBTC at end of session with HR returned to baseline.  Anticipate as acute medical issues resolve, mobility and activity tolerance will improve.  Will

## 2025-03-28 NOTE — CARE COORDINATION
Signed       Transition of Care Plan:     RUR: 12 %   Prior Level of Functioning: Independent   Disposition: Home with HH  DENISE:   Follow up appointments: PCP, ID Thoracic surgery   DME needed: TBD  Transportation at discharge: Family/Friends   IM/IMM Medicare/ letter given: 3/26  Is patient a [place] and connected with VA? No  Caregiver Contact: Coudavid Givens [phone]  Discharge Caregiver contacted prior to discharge? No  Care Conference needed? No  Barriers to discharge:    Chest tube d/c'd   Care manager met with patient and she has not chosen an agency. Encouraged patient to have a choice soon.   At Discharge   Patient will be staying with her friend Gabe Albarado address [address] [address]. Phone [phone].   Galina Mckeon RN,Care Management

## 2025-03-29 ENCOUNTER — APPOINTMENT (OUTPATIENT)
Facility: HOSPITAL | Age: 75
DRG: 193 | End: 2025-03-29
Attending: STUDENT IN AN ORGANIZED HEALTH CARE EDUCATION/TRAINING PROGRAM
Payer: MEDICARE

## 2025-03-29 LAB
ANION GAP SERPL CALC-SCNC: 2 MMOL/L (ref 2–12)
BASOPHILS # BLD: 0.2 K/UL (ref 0–0.1)
BASOPHILS NFR BLD: 2 % (ref 0–1)
BUN SERPL-MCNC: 10 MG/DL (ref 6–20)
BUN/CREAT SERPL: 19 (ref 12–20)
CALCIUM SERPL-MCNC: 8.8 MG/DL (ref 8.5–10.1)
CHLORIDE SERPL-SCNC: 105 MMOL/L (ref 97–108)
CO2 SERPL-SCNC: 29 MMOL/L (ref 21–32)
CREAT SERPL-MCNC: 0.53 MG/DL (ref 0.55–1.02)
DIFFERENTIAL METHOD BLD: ABNORMAL
EOSINOPHIL # BLD: 0.4 K/UL (ref 0–0.4)
EOSINOPHIL NFR BLD: 4 % (ref 0–7)
ERYTHROCYTE [DISTWIDTH] IN BLOOD BY AUTOMATED COUNT: 15.4 % (ref 11.5–14.5)
GLUCOSE SERPL-MCNC: 88 MG/DL (ref 65–100)
HCT VFR BLD AUTO: 27.9 % (ref 35–47)
HGB BLD-MCNC: 8.9 G/DL (ref 11.5–16)
IMM GRANULOCYTES # BLD AUTO: 0 K/UL
IMM GRANULOCYTES NFR BLD AUTO: 0 %
LYMPHOCYTES # BLD: 1.01 K/UL (ref 0.8–3.5)
LYMPHOCYTES NFR BLD: 10 % (ref 12–49)
MAGNESIUM SERPL-MCNC: 2.2 MG/DL (ref 1.6–2.4)
MCH RBC QN AUTO: 25.9 PG (ref 26–34)
MCHC RBC AUTO-ENTMCNC: 31.9 G/DL (ref 30–36.5)
MCV RBC AUTO: 81.3 FL (ref 80–99)
MONOCYTES # BLD: 0.71 K/UL (ref 0–1)
MONOCYTES NFR BLD: 7 % (ref 5–13)
NEUTS SEG # BLD: 7.78 K/UL (ref 1.8–8)
NEUTS SEG NFR BLD: 77 % (ref 32–75)
NRBC # BLD: 0 K/UL (ref 0–0.01)
NRBC BLD-RTO: 0 PER 100 WBC
PHOSPHATE SERPL-MCNC: 3 MG/DL (ref 2.6–4.7)
PLATELET # BLD AUTO: 425 K/UL (ref 150–400)
PMV BLD AUTO: 9.2 FL (ref 8.9–12.9)
POTASSIUM SERPL-SCNC: 4.1 MMOL/L (ref 3.5–5.1)
RBC # BLD AUTO: 3.43 M/UL (ref 3.8–5.2)
RBC MORPH BLD: ABNORMAL
SODIUM SERPL-SCNC: 136 MMOL/L (ref 136–145)
WBC # BLD AUTO: 10.1 K/UL (ref 3.6–11)

## 2025-03-29 PROCEDURE — 2580000003 HC RX 258: Performed by: STUDENT IN AN ORGANIZED HEALTH CARE EDUCATION/TRAINING PROGRAM

## 2025-03-29 PROCEDURE — 6370000000 HC RX 637 (ALT 250 FOR IP): Performed by: NURSE PRACTITIONER

## 2025-03-29 PROCEDURE — 80048 BASIC METABOLIC PNL TOTAL CA: CPT

## 2025-03-29 PROCEDURE — 6370000000 HC RX 637 (ALT 250 FOR IP): Performed by: STUDENT IN AN ORGANIZED HEALTH CARE EDUCATION/TRAINING PROGRAM

## 2025-03-29 PROCEDURE — 83735 ASSAY OF MAGNESIUM: CPT

## 2025-03-29 PROCEDURE — 85025 COMPLETE CBC W/AUTO DIFF WBC: CPT

## 2025-03-29 PROCEDURE — 6360000002 HC RX W HCPCS: Performed by: NURSE PRACTITIONER

## 2025-03-29 PROCEDURE — 2500000003 HC RX 250 WO HCPCS: Performed by: STUDENT IN AN ORGANIZED HEALTH CARE EDUCATION/TRAINING PROGRAM

## 2025-03-29 PROCEDURE — 6360000002 HC RX W HCPCS: Performed by: STUDENT IN AN ORGANIZED HEALTH CARE EDUCATION/TRAINING PROGRAM

## 2025-03-29 PROCEDURE — 84100 ASSAY OF PHOSPHORUS: CPT

## 2025-03-29 PROCEDURE — 71045 X-RAY EXAM CHEST 1 VIEW: CPT

## 2025-03-29 PROCEDURE — 2580000003 HC RX 258: Performed by: NURSE PRACTITIONER

## 2025-03-29 PROCEDURE — 1100000000 HC RM PRIVATE

## 2025-03-29 RX ADMIN — VANCOMYCIN HYDROCHLORIDE 1000 MG: 1 INJECTION, POWDER, LYOPHILIZED, FOR SOLUTION INTRAVENOUS at 11:57

## 2025-03-29 RX ADMIN — PIPERACILLIN AND TAZOBACTAM 3375 MG: 3; .375 INJECTION, POWDER, LYOPHILIZED, FOR SOLUTION INTRAVENOUS at 17:28

## 2025-03-29 RX ADMIN — SENNOSIDES 8.6 MG: 8.6 TABLET, FILM COATED ORAL at 09:00

## 2025-03-29 RX ADMIN — SODIUM CHLORIDE, PRESERVATIVE FREE 10 ML: 5 INJECTION INTRAVENOUS at 09:01

## 2025-03-29 RX ADMIN — NYSTATIN 500000 UNITS: 100000 SUSPENSION ORAL at 17:28

## 2025-03-29 RX ADMIN — SODIUM CHLORIDE, PRESERVATIVE FREE 10 ML: 5 INJECTION INTRAVENOUS at 20:07

## 2025-03-29 RX ADMIN — NYSTATIN 500000 UNITS: 100000 SUSPENSION ORAL at 14:31

## 2025-03-29 RX ADMIN — PIPERACILLIN AND TAZOBACTAM 3375 MG: 3; .375 INJECTION, POWDER, LYOPHILIZED, FOR SOLUTION INTRAVENOUS at 09:26

## 2025-03-29 RX ADMIN — PAROXETINE HYDROCHLORIDE 40 MG: 20 TABLET, FILM COATED ORAL at 09:19

## 2025-03-29 RX ADMIN — LEVOTHYROXINE SODIUM 200 MCG: 0.05 TABLET ORAL at 07:10

## 2025-03-29 RX ADMIN — NYSTATIN 500000 UNITS: 100000 SUSPENSION ORAL at 20:07

## 2025-03-29 RX ADMIN — PIPERACILLIN AND TAZOBACTAM 3375 MG: 3; .375 INJECTION, POWDER, LYOPHILIZED, FOR SOLUTION INTRAVENOUS at 01:14

## 2025-03-29 RX ADMIN — NYSTATIN 500000 UNITS: 100000 SUSPENSION ORAL at 09:05

## 2025-03-29 RX ADMIN — ENOXAPARIN SODIUM 40 MG: 100 INJECTION SUBCUTANEOUS at 09:06

## 2025-03-29 ASSESSMENT — PAIN SCALES - GENERAL
PAINLEVEL_OUTOF10: 0

## 2025-03-29 NOTE — CARE COORDINATION
12:26 PM  Transition of Care Plan:    RUR: 12%  Prior Level of Functioning: Independent  Disposition: Home with HH  DENISE: 3/30/25  Follow up appointments: PCP/Specialists  DME needed: None  Transportation at discharge: Family/Friend  IM/IMM Medicare/ letter given: 1st IMM 3/26/25  Caregiver Contact: Coudavid Givens [phone]   Barriers to discharge: None    At discharge patient will be staying with her friend Gabe Albarado address [address] [address]. Phone [phone].     FOC offered for Home Health.  Referral placed to Daquan Marshall via [facility].  CM awaiting receipt of referral and updates at this time.  CM will continue to follow and assist with TRINO needs as they arise.    DARLENE Palacios/CM

## 2025-03-29 NOTE — PROGRESS NOTES
ICU Transfer/Accept Summary     This patient is being transferred OUT OF THE ICU  DATE OF TRANSFER: 3/28/2025       PATIENT ID: Cori Dennis  MRN: [id number]   YOB: 1950    PRIMARY CARE PROVIDER: Verna Lynne APRN - NP   DATE OF ADMISSION: 3/25/2025  4:32 PM    ATTENDING PHYSICIAN: Anamika Bañuelos MD  CONSULTATIONS:   IP CONSULT TO THORACIC SURGERY  IP CONSULT TO INFECTIOUS DISEASES  IP CONSULT TO PHARMACY    PROCEDURES/SURGERIES:   * No surgery found *    REASON FOR ADMISSION: Empyema of lung (HCC)     HOSPITAL PROBLEM LIST:  Patient Active Problem List   Diagnosis    OA (osteoarthritis) of knee    Acquired hypothyroidism    Medium vessel vasculitis    Hyperlipidemia    Anxiety disorder    Essential hypertension    Attention-deficit hyperactivity disorder, predominantly inattentive type    Varicose veins of lower extremity    Peptic ulcer    Dyspnea    Empyema of lung (HCC)    Abscess of lower lobe of left lung with pneumonia (HCC)    Leukocytosis    Oral candidiasis    Pleural effusion on left             Brief HPI and Hospital Course:    This is a 74-year-old woman with past medical history significant for hypertension, hypothyroidism, blindness right eye presented at [facility] with lethargy, fatigue and shortness of breath.  Evaluation at the emergency room revealed complex left pleural effusion and need for possible lung abscess.  Patient was transferred to [facility] for higher level of care and evaluation by thoracic surgery service.  She was admitted to ICU.  Pigtail catheter was placed with lytic instillation.  Pleural fluid consistent with exudative effusion.  Patient was seen by ID team and started on IV Zosyn and vancomycin.  Chest tube was discontinued.  Patient is hemodynamically stable and no longer require ICU care.  Medical consult was requested with hospitalist for downgrade and for continuation of care  Assessment and Plan:  1.

## 2025-03-29 NOTE — PROGRESS NOTES
independently reviewed and interpreted patient's lab and all other diagnostic data    Notes reviewed from all clinical/nonclinical/nursing services involved in patient's clinical care. Care coordination discussions were held with appropriate clinical/nonclinical/ nursing providers based on care coordination needs.     Labs:     Recent Labs     03/28/25  0540 03/29/25  0546   WBC 10.5 10.1   HGB 9.4* 8.9*   HCT 29.5* 27.9*    425*     Recent Labs     03/27/25  0134 03/28/25  0540 03/29/25  0433 03/29/25  0546   * 136 136  --    K 4.4 4.2 4.1  --     106 105  --    CO2 23 25 29  --    BUN 18 12 10  --    MG 2.4  --   --  2.2   PHOS 3.2  --   --  3.0     Recent Labs     03/27/25 0134 03/28/25  0540   ALT 10* 11*   GLOB 4.1* 4.1*     No results for input(s): \"INR\", \"APTT\" in the last 72 hours.    Invalid input(s): \"PTP\"   No results for input(s): \"TIBC\" in the last 72 hours.    Invalid input(s): \"FE\", \"PSAT\", \"FERR\"   No results found for: \"RBCF\"   No results for input(s): \"PH\", \"PCO2\", \"PO2\" in the last 72 hours.  No results for input(s): \"CPK\" in the last 72 hours.    Invalid input(s): \"CPKMB\", \"CKNDX\", \"TROIQ\"  Lab Results   Component Value Date/Time    CHOL 262 06/13/2024 08:25 AM    HDL 76 06/13/2024 08:25 AM    .2 06/13/2024 08:25 AM     08/16/2021 08:47 AM     No results found for: \"GLUCPOC\"        Medications Reviewed:     Current Facility-Administered Medications   Medication Dose Route Frequency    [START ON 3/30/2025] Vancomycin - Please draw vanc level on 3/30 0600. No earlier than this. Thank you!!   Other Once    Vancomycin - pharmacy to dose   Other RX Placeholder    vancomycin (VANCOCIN) 1,000 mg in sodium chloride 0.9 % 250 mL IVPB ([id number])  1,000 mg IntraVENous Q12H    ibuprofen (ADVIL;MOTRIN) tablet 600 mg  600 mg Oral Q6H PRN    nystatin (MYCOSTATIN) 928533 UNIT/ML suspension 500,000 Units  5 mL Oral 4x Daily    senna (SENOKOT) tablet 8.6 mg  1 tablet Oral Daily     enoxaparin (LOVENOX) injection 40 mg  40 mg SubCUTAneous Daily    vitamin D (ERGOCALCIFEROL) capsule 50,000 Units  50,000 Units Oral Weekly    levothyroxine (SYNTHROID) tablet 200 mcg  200 mcg Oral Daily    PARoxetine (PAXIL) tablet 40 mg  40 mg Oral Daily    sodium chloride flush 0.9 % injection 5-40 mL  5-40 mL IntraVENous 2 times per day    sodium chloride flush 0.9 % injection 5-40 mL  5-40 mL IntraVENous PRN    0.9 % sodium chloride infusion   IntraVENous PRN    potassium chloride 20 mEq/50 mL IVPB (Central Line)  20 mEq IntraVENous PRN    Or    potassium chloride 10 mEq/100 mL IVPB (Peripheral Line)  10 mEq IntraVENous PRN    magnesium sulfate 2000 mg in 50 mL IVPB premix  2,000 mg IntraVENous PRN    ondansetron (ZOFRAN-ODT) disintegrating tablet 4 mg  4 mg Oral Q8H PRN    Or    ondansetron (ZOFRAN) injection 4 mg  4 mg IntraVENous Q6H PRN    polyethylene glycol (GLYCOLAX) packet 17 g  17 g Oral Daily PRN    acetaminophen (TYLENOL) tablet 650 mg  650 mg Oral Q6H PRN    Or    acetaminophen (TYLENOL) suppository 650 mg  650 mg Rectal Q6H PRN    ipratropium 0.5 mg-albuterol 2.5 mg (DUONEB) nebulizer solution 1 Dose  1 Dose Inhalation Q4H PRN    piperacillin-tazobactam (ZOSYN) 3,375 mg in sodium chloride 0.9 % 50 mL IVPB (addEASE)  3,375 mg IntraVENous q8h    lidocaine 4 % external patch 1 patch  1 patch TransDERmal Daily    oxyCODONE (ROXICODONE) immediate release tablet 2.5 mg  2.5 mg Oral Q4H PRN     ______________________________________________________________________  EXPECTED LENGTH OF STAY: Unable to retrieve estimated LOS  ACTUAL LENGTH OF STAY:          4                 Denys Avilez MD

## 2025-03-30 VITALS
RESPIRATION RATE: 18 BRPM | SYSTOLIC BLOOD PRESSURE: 135 MMHG | HEART RATE: 89 BPM | TEMPERATURE: 98.1 F | HEIGHT: 65 IN | WEIGHT: 132 LBS | DIASTOLIC BLOOD PRESSURE: 80 MMHG | BODY MASS INDEX: 21.99 KG/M2 | OXYGEN SATURATION: 97 %

## 2025-03-30 LAB
ANION GAP SERPL CALC-SCNC: 7 MMOL/L (ref 2–12)
BACTERIA SPEC CULT: NORMAL
BASOPHILS # BLD: 0.1 K/UL (ref 0–0.1)
BASOPHILS NFR BLD: 1 % (ref 0–1)
BUN SERPL-MCNC: 11 MG/DL (ref 6–20)
BUN/CREAT SERPL: 20 (ref 12–20)
CALCIUM SERPL-MCNC: 8.5 MG/DL (ref 8.5–10.1)
CHLORIDE SERPL-SCNC: 100 MMOL/L (ref 97–108)
CO2 SERPL-SCNC: 24 MMOL/L (ref 21–32)
CREAT SERPL-MCNC: 0.55 MG/DL (ref 0.55–1.02)
DIFFERENTIAL METHOD BLD: ABNORMAL
EOSINOPHIL # BLD: 0.21 K/UL (ref 0–0.4)
EOSINOPHIL NFR BLD: 2 % (ref 0–7)
ERYTHROCYTE [DISTWIDTH] IN BLOOD BY AUTOMATED COUNT: 15.7 % (ref 11.5–14.5)
GLUCOSE SERPL-MCNC: 100 MG/DL (ref 65–100)
GRAM STN SPEC: NORMAL
HCT VFR BLD AUTO: 29.9 % (ref 35–47)
HGB BLD-MCNC: 9.1 G/DL (ref 11.5–16)
IMM GRANULOCYTES # BLD AUTO: 0 K/UL
IMM GRANULOCYTES NFR BLD AUTO: 0 %
LYMPHOCYTES # BLD: 1.35 K/UL (ref 0.8–3.5)
LYMPHOCYTES NFR BLD: 13 % (ref 12–49)
Lab: NORMAL
Lab: NORMAL
MCH RBC QN AUTO: 25.1 PG (ref 26–34)
MCHC RBC AUTO-ENTMCNC: 30.4 G/DL (ref 30–36.5)
MCV RBC AUTO: 82.4 FL (ref 80–99)
MONOCYTES # BLD: 1.04 K/UL (ref 0–1)
MONOCYTES NFR BLD: 10 % (ref 5–13)
MYELOCYTES NFR BLD MANUAL: 1 %
NEUTS BAND NFR BLD MANUAL: 5 % (ref 0–6)
NEUTS SEG # BLD: 7.59 K/UL (ref 1.8–8)
NEUTS SEG NFR BLD: 68 % (ref 32–75)
NRBC # BLD: 0 K/UL (ref 0–0.01)
NRBC BLD-RTO: 0 PER 100 WBC
PLATELET # BLD AUTO: 400 K/UL (ref 150–400)
PMV BLD AUTO: 9 FL (ref 8.9–12.9)
POTASSIUM SERPL-SCNC: 4.4 MMOL/L (ref 3.5–5.1)
RBC # BLD AUTO: 3.63 M/UL (ref 3.8–5.2)
RBC MORPH BLD: ABNORMAL
SERVICE CMNT-IMP: NORMAL
SODIUM SERPL-SCNC: 131 MMOL/L (ref 136–145)
VANCOMYCIN SERPL-MCNC: 14.1 UG/ML
WBC # BLD AUTO: 10.4 K/UL (ref 3.6–11)

## 2025-03-30 PROCEDURE — 6360000002 HC RX W HCPCS: Performed by: NURSE PRACTITIONER

## 2025-03-30 PROCEDURE — 2580000003 HC RX 258: Performed by: STUDENT IN AN ORGANIZED HEALTH CARE EDUCATION/TRAINING PROGRAM

## 2025-03-30 PROCEDURE — 6360000002 HC RX W HCPCS: Performed by: STUDENT IN AN ORGANIZED HEALTH CARE EDUCATION/TRAINING PROGRAM

## 2025-03-30 PROCEDURE — 6370000000 HC RX 637 (ALT 250 FOR IP): Performed by: STUDENT IN AN ORGANIZED HEALTH CARE EDUCATION/TRAINING PROGRAM

## 2025-03-30 PROCEDURE — 80048 BASIC METABOLIC PNL TOTAL CA: CPT

## 2025-03-30 PROCEDURE — 6370000000 HC RX 637 (ALT 250 FOR IP): Performed by: NURSE PRACTITIONER

## 2025-03-30 PROCEDURE — 85025 COMPLETE CBC W/AUTO DIFF WBC: CPT

## 2025-03-30 PROCEDURE — 2580000003 HC RX 258: Performed by: NURSE PRACTITIONER

## 2025-03-30 PROCEDURE — 80202 ASSAY OF VANCOMYCIN: CPT

## 2025-03-30 RX ORDER — NYSTATIN 100000 [USP'U]/ML
5 SUSPENSION ORAL 4 TIMES DAILY
Qty: 140 ML | Refills: 0 | Status: SHIPPED | OUTPATIENT
Start: 2025-03-30 | End: 2025-04-06

## 2025-03-30 RX ORDER — DOXYCYCLINE HYCLATE 100 MG
100 TABLET ORAL 2 TIMES DAILY
Qty: 10 TABLET | Refills: 0 | Status: SHIPPED | OUTPATIENT
Start: 2025-03-30 | End: 2025-04-04

## 2025-03-30 RX ADMIN — PAROXETINE HYDROCHLORIDE 40 MG: 20 TABLET, FILM COATED ORAL at 08:07

## 2025-03-30 RX ADMIN — VANCOMYCIN HYDROCHLORIDE 1000 MG: 1 INJECTION, POWDER, LYOPHILIZED, FOR SOLUTION INTRAVENOUS at 00:45

## 2025-03-30 RX ADMIN — PHENOL 1 SPRAY: 1.5 LIQUID ORAL at 06:06

## 2025-03-30 RX ADMIN — LEVOTHYROXINE SODIUM 200 MCG: 0.05 TABLET ORAL at 05:10

## 2025-03-30 RX ADMIN — PIPERACILLIN AND TAZOBACTAM 3375 MG: 3; .375 INJECTION, POWDER, LYOPHILIZED, FOR SOLUTION INTRAVENOUS at 00:46

## 2025-03-30 RX ADMIN — ENOXAPARIN SODIUM 40 MG: 100 INJECTION SUBCUTANEOUS at 08:07

## 2025-03-30 RX ADMIN — PIPERACILLIN AND TAZOBACTAM 3375 MG: 3; .375 INJECTION, POWDER, LYOPHILIZED, FOR SOLUTION INTRAVENOUS at 08:34

## 2025-03-30 RX ADMIN — SENNOSIDES 8.6 MG: 8.6 TABLET, FILM COATED ORAL at 08:07

## 2025-03-30 RX ADMIN — NYSTATIN 500000 UNITS: 100000 SUSPENSION ORAL at 08:07

## 2025-03-30 ASSESSMENT — PAIN SCALES - GENERAL
PAINLEVEL_OUTOF10: 0

## 2025-03-30 ASSESSMENT — PAIN DESCRIPTION - LOCATION: LOCATION: THROAT

## 2025-03-30 NOTE — CARE COORDINATION
10:58 AM  DANIEL received notification that [facility] able to accept and accommodate patient for Home Health services.  Follow-up placed on AVS.    DARLENE Palacios/DANIEL

## 2025-03-30 NOTE — DISCHARGE SUMMARY
Discharge Summary       PATIENT ID: Cori Dennis  MRN: [id number]   YOB: 1950    DATE OF ADMISSION: 3/25/2025  4:32 PM    DATE OF DISCHARGE: 3/30/25   PRIMARY CARE PROVIDER: Verna Lynne APRN - [facility]     ATTENDING PHYSICIAN: Densy Avilez MD  DISCHARGING PROVIDER: Denys Avilez MD    To contact this individual call [phone] and ask the  to page.  If unavailable ask to be transferred the Adult Hospitalist Department.    CONSULTATIONS: IP CONSULT TO THORACIC SURGERY  IP CONSULT TO INFECTIOUS DISEASES  IP CONSULT TO PHARMACY  IP CONSULT HOME HEALTH    PROCEDURES/SURGERIES: * No surgery found *    ADMITTING DIAGNOSES & HOSPITAL COURSE:     Per H&P:    Pt is a 73yo F w/ PMH as noted above who presented to [facility] ICU from [facility] for HLOC. Pt presented to OSH due to 2 weeks of progressive fatigue, lethargy, and dyspnea w/ she states began after possible aspiration event and URI like symptoms w/ related non-productive cough and pleuritic pain. No sick contacts or other exposures. Workup at OSH significant for complex L pleural effusion and concern for L lung abscess. [facility] thoracic surgery and CCM consulted and pt transferred to [facility].     On arrival, pt NAD, VSS, on O2 per NC.     Plan for L chest tube, send fluid studies, Cx  Lytic trial  O2 per NC for spO2 92-98%  PRN bronchodilators  Abx as below  Thoracic surgery consult  DISCHARGE DIAGNOSES / PLAN:      Acute hypoxic respiratory failure  Complex left pleural effusion  Bacterial pneumonia  -Status post chest tube  -Chest tube removed 3/28  -On room air  -Cardiothoracic surgery has seen and have signed off  -ID on board  -On vancomycin and Zosyn  -DC on 7 days total of Augmentin and doxycycline upon discharge through 4/3  -Nystatin swish and spit 4 times daily x 10 days for oral candidiasis through 4/6     Essential hypertension  -BP looks good     Hypothyroidism  -Continue Synthroid     Right eye

## 2025-03-30 NOTE — PROGRESS NOTES
Pharmacist Note - Vancomycin Dosing  Therapy day 3  Indication: aspiration PNA  Current regimen: 1000mg q12h    Recent Labs     03/28/25  0540 03/29/25  0433 03/29/25  0546 03/30/25  0029   WBC 10.5  --  10.1 10.4   CREATININE 0.57 0.53*  --  0.55   BUN 12 10  --  11       A random vancomycin level of 14.1 mcg/mL was obtained and from this level, the patient's AUC24 is calculated to be 585 with the current regimen.     Goal target range AUC/HUGH 400-600      Plan: Change to Vancomycin 750mg q12h for predicted  mg/L*hr. Pharmacy will continue to monitor this patient daily for changes in clinical status and renal function.    *Random vancomycin levels are used to calculate AUC/HUGH, this level should not be interpreted as a trough. Vancomycin has been dosed using Bayesian kinetics software to target an AUC24:HUGH of 400-600, which provides adequate exposure for as assumed infection due to MRSA with an HUGH of 1 or less while reducing the risk of nephrotoxicity as seen with traditional trough based dosing goals.

## 2025-04-01 NOTE — CARE COORDINATION
CM lead received handoff from unit CM regarding pending HH referrals. Noted patient will be returning to her home at [address] [place], [place] [id number].    [facility].    CM called patient at [phone] and notified of agency contact info.    JASMIN MCKINLEY/CRM

## 2025-04-02 NOTE — PROGRESS NOTES
Physician Progress Note      PATIENT:               JAZ ROBERTS  CSN #:                  [id number]  :                       1950  ADMIT DATE:       3/25/2025 4:32 PM  DISCH DATE:        3/30/2025 2:16 PM  RESPONDING  PROVIDER #:        Cindy Morales NP          QUERY TEXT:    Pneumonia with \"possible aspiration event\" documented in H&P. Please specify   the type of pneumonia and the causative organism:    The clinical indicators include:  3/25 H&P Dr Dugan: \"Pt presented to OSH due to 2 weeks of progressive   fatigue, lethargy, and dyspnea w/ she states began after possible aspiration   event and URI like symptoms w/ related non-productive cough and pleuritic   pain.\"    Lab 3/25/25: pleural/bronchial group:  fluid yellow and appears cloudy, RBC   >100, polys 65, lymphocytes 25, mono/macro 10    CXR 3/25: Small left-sided pleural effusion with chest tube. No pneumothorax.   Pulmonary edema.    CXR 3/26: Unchanged position of left-sided chest tube with moderate pleural   effusion and basilar atelectasis. No pneumothorax.    CT chest 3/26:  1.  Left chest tube at the left chest wall. Recommend repositioning for   satisfactory placement of the chest tube within the left pleural space.  2. Slight decrease in loculated left pleural effusion with few foci of left   pleural air noted. Underlying left basilar atelectasis/consolidation is   similar  to prior.  3. Nonspecific groundglass opacity at the right lung apex.    CT Chest 3/27:  1. Interval replacement of the left chest tube into the left pleural space.   The tube is now kinked around one of the left ribs. Correlation with chest   tube  function is recommended.  2. Continued decrease in size of a left pleural effusion, which is now small.   Improving left lower lobe consolidation, which may reflect resolving   atelectasis or consolidation.  3. Debris or secretions in the left lower lobe bronchi raises possibility of   aspiration. Speech consultation  recommended if not already done.    \"Bacterial pneumonia\" noted on DS by Dr Denys Avilez    MAR: Zithromax IV (3/25-3/28), Zosyn IV q8h, Vancomycin IV.  Discharge summary:  \"DC on 7 days total of Augmentin and doxycycline upon   discharge through 4/3\"  Options provided:  -- Aspiration pneumonia  -- Pneumonia unrelated to aspiration  -- Other - I will add my own diagnosis  -- Disagree - Not applicable / Not valid  -- Disagree - Clinically unable to determine / Unknown  -- Refer to Clinical Documentation Reviewer    PROVIDER RESPONSE TEXT:    This patient has aspiration pneumonia.    Query created by: MARY LOWERY on 4/2/2025 8:10 AM      Electronically signed by:  Cindy Morales NP 4/2/2025 9:43 AM

## 2025-05-24 ENCOUNTER — HOSPITAL ENCOUNTER (OUTPATIENT)
Age: 75
Setting detail: SPECIMEN
Discharge: HOME | End: 2025-05-27
Payer: MEDICARE

## 2025-05-24 ENCOUNTER — TRANSCRIBE ORDERS (OUTPATIENT)
Age: 75
End: 2025-05-24

## 2025-05-24 ENCOUNTER — HOSPITAL ENCOUNTER (OUTPATIENT)
Age: 75
Discharge: HOME | End: 2025-05-27

## 2025-05-24 DIAGNOSIS — J96.01 ACUTE RESPIRATORY FAILURE WITH HYPOXIA (HCC): ICD-10-CM

## 2025-05-24 DIAGNOSIS — J86.9 EMPYEMA OF PLEURA (HCC): ICD-10-CM

## 2025-05-24 DIAGNOSIS — R07.9 CHEST PAIN, UNSPECIFIED TYPE: ICD-10-CM

## 2025-05-24 DIAGNOSIS — J86.9 EMPYEMA OF PLEURA (HCC): Primary | ICD-10-CM

## 2025-05-24 DIAGNOSIS — R07.9 CHEST PAIN, UNSPECIFIED TYPE: Primary | ICD-10-CM

## 2025-05-24 LAB
ANION GAP SERPL CALC-SCNC: 13 MMOL/L
BASOPHILS # BLD: 0.06 K/UL (ref 0–0.1)
BASOPHILS NFR BLD: 0.4 % (ref 0–2)
BNP SERPL-MCNC: 2360 PG/ML (ref 36–1800)
BUN SERPL-MCNC: 13 MG/DL (ref 6–23)
BUN/CREAT SERPL: 22
CA-I BLD-MCNC: 10.1 MG/DL (ref 8.5–10.1)
CHLORIDE SERPL-SCNC: 96 MMOL/L (ref 98–107)
CO2 SERPL-SCNC: 24 MMOL/L (ref 21–32)
CREAT SERPL-MCNC: 0.61 MG/DL (ref 0.6–1.3)
DIFFERENTIAL METHOD BLD: ABNORMAL
EOSINOPHIL # BLD: 0.31 K/UL (ref 0–0.4)
EOSINOPHIL NFR BLD: 2.3 % (ref 0–5)
ERYTHROCYTE [DISTWIDTH] IN BLOOD BY AUTOMATED COUNT: 17.6 % (ref 11.6–14.5)
GLUCOSE SERPL-MCNC: 111 MG/DL (ref 74–108)
HCT VFR BLD AUTO: 36.6 % (ref 35–45)
HGB BLD-MCNC: 11.2 G/DL (ref 12–16)
IMM GRANULOCYTES # BLD AUTO: 0.14 K/UL (ref 0–0.04)
IMM GRANULOCYTES NFR BLD AUTO: 1 % (ref 0–0.5)
LACTATE SERPL-SCNC: 1.3 MMOL/L
LYMPHOCYTES # BLD: 0.84 K/UL (ref 0.9–3.6)
LYMPHOCYTES NFR BLD: 6.1 % (ref 21–52)
MCH RBC QN AUTO: 27.4 PG (ref 24–34)
MCHC RBC AUTO-ENTMCNC: 30.6 G/DL (ref 31–37)
MCV RBC AUTO: 89.5 FL (ref 78–100)
MONOCYTES # BLD: 0.83 K/UL (ref 0.05–1.2)
MONOCYTES NFR BLD: 6.1 % (ref 3–10)
NEUTS SEG # BLD: 11.51 K/UL (ref 1.8–8)
NEUTS SEG NFR BLD: 84.1 % (ref 40–73)
NRBC # BLD: 0 K/UL (ref 0–0.01)
NRBC BLD-RTO: 0 PER 100 WBC
PLATELET # BLD AUTO: 675 K/UL (ref 135–420)
PMV BLD AUTO: 8.7 FL (ref 9.2–11.8)
POTASSIUM SERPL-SCNC: 5 MMOL/L (ref 3.5–5.5)
RBC # BLD AUTO: 4.09 M/UL (ref 4.2–5.3)
SODIUM SERPL-SCNC: 134 MMOL/L (ref 136–145)
TROPONIN T SERPL HS-MCNC: 18.1 NG/L (ref 0–14)
TROPONIN T SERPL HS-MCNC: 20.1 NG/L (ref 0–14)
WBC # BLD AUTO: 13.7 K/UL (ref 4.6–13.2)

## 2025-05-24 PROCEDURE — 84484 ASSAY OF TROPONIN QUANT: CPT

## 2025-05-24 PROCEDURE — 93005 ELECTROCARDIOGRAM TRACING: CPT

## 2025-05-24 PROCEDURE — 36415 COLL VENOUS BLD VENIPUNCTURE: CPT

## 2025-05-24 PROCEDURE — 6360000004 HC RX CONTRAST MEDICATION: Performed by: NURSE PRACTITIONER

## 2025-05-24 PROCEDURE — 71045 X-RAY EXAM CHEST 1 VIEW: CPT

## 2025-05-24 PROCEDURE — 83880 ASSAY OF NATRIURETIC PEPTIDE: CPT

## 2025-05-24 PROCEDURE — 80048 BASIC METABOLIC PNL TOTAL CA: CPT

## 2025-05-24 PROCEDURE — 71275 CT ANGIOGRAPHY CHEST: CPT

## 2025-05-24 PROCEDURE — 83605 ASSAY OF LACTIC ACID: CPT

## 2025-05-24 PROCEDURE — 85025 COMPLETE CBC W/AUTO DIFF WBC: CPT

## 2025-05-24 RX ORDER — IOPAMIDOL 755 MG/ML
100 INJECTION, SOLUTION INTRAVASCULAR
Status: COMPLETED | OUTPATIENT
Start: 2025-05-24 | End: 2025-05-24

## 2025-05-24 RX ADMIN — IOPAMIDOL 100 ML: 755 INJECTION, SOLUTION INTRAVENOUS at 16:22

## 2025-05-24 NOTE — PROGRESS NOTES
Review of Systems: MRT Response Team Called while pt was staying on SNF 3rd floor [facility]    Physical Exam: Issue Low BP 90/60, faint feeling, difficult getting pulse ox reading 95% SATs after warming of hand, pt was already wearing 2 liters 0xygen via NC, E  EKG was ordered stat and CXR, EKG was done at bed side and RRT took paper orders and face sheet down to admission person in the ER ( Saturday). Dr Brothers also ordered a liter of fluids and IV placed by nursing staff.   No resp distress noted.

## 2025-05-25 ENCOUNTER — HOSPITAL ENCOUNTER (INPATIENT)
Facility: HOSPITAL | Age: 75
LOS: 16 days | Discharge: NURSING, REHAB OR LONG-TERM CARE | DRG: 177 | End: 2025-06-10
Attending: INTERNAL MEDICINE | Admitting: INTERNAL MEDICINE
Payer: MEDICARE

## 2025-05-25 DIAGNOSIS — I50.9 ACUTE CONGESTIVE HEART FAILURE, UNSPECIFIED HEART FAILURE TYPE (HCC): Primary | ICD-10-CM

## 2025-05-25 DIAGNOSIS — J86.9 EMPYEMA, LEFT (HCC): ICD-10-CM

## 2025-05-25 PROBLEM — J90 PLEURAL EFFUSION: Status: ACTIVE | Noted: 2025-05-25

## 2025-05-25 PROBLEM — E87.1 HYPONATREMIA: Status: ACTIVE | Noted: 2025-05-25

## 2025-05-25 PROBLEM — D64.9 NORMOCYTIC ANEMIA: Status: ACTIVE | Noted: 2025-05-25

## 2025-05-25 LAB
ALBUMIN SERPL-MCNC: 1.8 G/DL (ref 3.4–5)
ALBUMIN/GLOB SERPL: 0.4 (ref 0.8–1.7)
ALP SERPL-CCNC: 137 U/L (ref 45–117)
ALT SERPL-CCNC: 20 U/L (ref 10–35)
ANION GAP SERPL CALC-SCNC: 11 MMOL/L (ref 3–18)
AST SERPL-CCNC: 31 U/L (ref 10–38)
BASOPHILS # BLD: 0.07 K/UL (ref 0–0.1)
BASOPHILS NFR BLD: 0.6 % (ref 0–2)
BILIRUB SERPL-MCNC: 0.2 MG/DL (ref 0.2–1)
BUN SERPL-MCNC: 12 MG/DL (ref 6–23)
BUN/CREAT SERPL: 24 (ref 12–20)
CALCIUM SERPL-MCNC: 9 MG/DL (ref 8.5–10.1)
CHLORIDE SERPL-SCNC: 98 MMOL/L (ref 98–107)
CO2 SERPL-SCNC: 22 MMOL/L (ref 21–32)
CREAT SERPL-MCNC: 0.5 MG/DL (ref 0.6–1.3)
DIFFERENTIAL METHOD BLD: ABNORMAL
EKG ATRIAL RATE: 120 BPM
EKG DIAGNOSIS: NORMAL
EKG P AXIS: 66 DEGREES
EKG P-R INTERVAL: 164 MS
EKG Q-T INTERVAL: 356 MS
EKG QRS DURATION: 96 MS
EKG QTC CALCULATION (BAZETT): 503 MS
EKG R AXIS: 33 DEGREES
EKG T AXIS: 62 DEGREES
EKG VENTRICULAR RATE: 120 BPM
EOSINOPHIL # BLD: 0.29 K/UL (ref 0–0.4)
EOSINOPHIL NFR BLD: 2.4 % (ref 0–5)
ERYTHROCYTE [DISTWIDTH] IN BLOOD BY AUTOMATED COUNT: 17.4 % (ref 11.6–14.5)
GLOBULIN SER CALC-MCNC: 4.4 G/DL (ref 2–4)
GLUCOSE SERPL-MCNC: 127 MG/DL (ref 74–108)
HCT VFR BLD AUTO: 28.1 % (ref 35–45)
HGB BLD-MCNC: 8.6 G/DL (ref 12–16)
IMM GRANULOCYTES # BLD AUTO: 0.16 K/UL (ref 0–0.04)
IMM GRANULOCYTES NFR BLD AUTO: 1.3 % (ref 0–0.5)
INR PPP: 1.2 (ref 0.9–1.1)
LYMPHOCYTES # BLD: 0.95 K/UL (ref 0.9–3.6)
LYMPHOCYTES NFR BLD: 7.8 % (ref 21–52)
MCH RBC QN AUTO: 27.2 PG (ref 24–34)
MCHC RBC AUTO-ENTMCNC: 30.6 G/DL (ref 31–37)
MCV RBC AUTO: 88.9 FL (ref 78–100)
MONOCYTES # BLD: 0.89 K/UL (ref 0.05–1.2)
MONOCYTES NFR BLD: 7.3 % (ref 3–10)
NEUTS SEG # BLD: 9.83 K/UL (ref 1.8–8)
NEUTS SEG NFR BLD: 80.6 % (ref 40–73)
NRBC # BLD: 0 K/UL (ref 0–0.01)
NRBC BLD-RTO: 0 PER 100 WBC
PLATELET # BLD AUTO: 594 K/UL (ref 135–420)
PMV BLD AUTO: 8.9 FL (ref 9.2–11.8)
POTASSIUM SERPL-SCNC: 3.8 MMOL/L (ref 3.5–5.5)
PROCALCITONIN SERPL-MCNC: 0.08 NG/ML
PROT SERPL-MCNC: 6.2 G/DL (ref 6.4–8.2)
PROTHROMBIN TIME: 15 SEC (ref 11.9–14.9)
RBC # BLD AUTO: 3.16 M/UL (ref 4.2–5.3)
SODIUM SERPL-SCNC: 132 MMOL/L (ref 136–145)
TROPONIN T SERPL HS-MCNC: 19.2 NG/L (ref 0–14)
WBC # BLD AUTO: 12.2 K/UL (ref 4.6–13.2)

## 2025-05-25 PROCEDURE — 85610 PROTHROMBIN TIME: CPT

## 2025-05-25 PROCEDURE — 87305 ASPERGILLUS AG IA: CPT

## 2025-05-25 PROCEDURE — 84145 PROCALCITONIN (PCT): CPT

## 2025-05-25 PROCEDURE — 2140000001 HC CVICU INTERMEDIATE R&B

## 2025-05-25 PROCEDURE — 86160 COMPLEMENT ANTIGEN: CPT

## 2025-05-25 PROCEDURE — 6360000002 HC RX W HCPCS: Performed by: INTERNAL MEDICINE

## 2025-05-25 PROCEDURE — 87040 BLOOD CULTURE FOR BACTERIA: CPT

## 2025-05-25 PROCEDURE — 86235 NUCLEAR ANTIGEN ANTIBODY: CPT

## 2025-05-25 PROCEDURE — 80053 COMPREHEN METABOLIC PANEL: CPT

## 2025-05-25 PROCEDURE — 6370000000 HC RX 637 (ALT 250 FOR IP): Performed by: INTERNAL MEDICINE

## 2025-05-25 PROCEDURE — 85025 COMPLETE CBC W/AUTO DIFF WBC: CPT

## 2025-05-25 PROCEDURE — 2500000003 HC RX 250 WO HCPCS: Performed by: STUDENT IN AN ORGANIZED HEALTH CARE EDUCATION/TRAINING PROGRAM

## 2025-05-25 PROCEDURE — 2580000003 HC RX 258: Performed by: INTERNAL MEDICINE

## 2025-05-25 PROCEDURE — 86225 DNA ANTIBODY NATIVE: CPT

## 2025-05-25 PROCEDURE — 99223 1ST HOSP IP/OBS HIGH 75: CPT | Performed by: INTERNAL MEDICINE

## 2025-05-25 PROCEDURE — 2500000003 HC RX 250 WO HCPCS: Performed by: INTERNAL MEDICINE

## 2025-05-25 PROCEDURE — 84484 ASSAY OF TROPONIN QUANT: CPT

## 2025-05-25 PROCEDURE — 94761 N-INVAS EAR/PLS OXIMETRY MLT: CPT

## 2025-05-25 PROCEDURE — 36415 COLL VENOUS BLD VENIPUNCTURE: CPT

## 2025-05-25 PROCEDURE — 6370000000 HC RX 637 (ALT 250 FOR IP): Performed by: STUDENT IN AN ORGANIZED HEALTH CARE EDUCATION/TRAINING PROGRAM

## 2025-05-25 PROCEDURE — 2700000000 HC OXYGEN THERAPY PER DAY

## 2025-05-25 PROCEDURE — 83516 IMMUNOASSAY NONANTIBODY: CPT

## 2025-05-25 RX ORDER — SODIUM CHLORIDE 0.9 % (FLUSH) 0.9 %
5-40 SYRINGE (ML) INJECTION PRN
Status: DISCONTINUED | OUTPATIENT
Start: 2025-05-25 | End: 2025-06-10 | Stop reason: HOSPADM

## 2025-05-25 RX ORDER — SODIUM CHLORIDE 9 MG/ML
INJECTION, SOLUTION INTRAVENOUS PRN
Status: DISCONTINUED | OUTPATIENT
Start: 2025-05-25 | End: 2025-06-10 | Stop reason: HOSPADM

## 2025-05-25 RX ORDER — ENOXAPARIN SODIUM 100 MG/ML
40 INJECTION SUBCUTANEOUS DAILY
Status: DISCONTINUED | OUTPATIENT
Start: 2025-05-26 | End: 2025-06-10 | Stop reason: HOSPADM

## 2025-05-25 RX ORDER — SODIUM CHLORIDE 0.9 % (FLUSH) 0.9 %
5-40 SYRINGE (ML) INJECTION PRN
Status: DISCONTINUED | OUTPATIENT
Start: 2025-05-25 | End: 2025-05-28

## 2025-05-25 RX ORDER — KETOROLAC TROMETHAMINE 15 MG/ML
15 INJECTION, SOLUTION INTRAMUSCULAR; INTRAVENOUS EVERY 6 HOURS PRN
Status: DISPENSED | OUTPATIENT
Start: 2025-05-25 | End: 2025-05-30

## 2025-05-25 RX ORDER — IPRATROPIUM BROMIDE AND ALBUTEROL SULFATE 2.5; .5 MG/3ML; MG/3ML
1 SOLUTION RESPIRATORY (INHALATION)
Status: DISCONTINUED | OUTPATIENT
Start: 2025-05-25 | End: 2025-05-25

## 2025-05-25 RX ORDER — SODIUM CHLORIDE 0.9 % (FLUSH) 0.9 %
5-40 SYRINGE (ML) INJECTION EVERY 12 HOURS SCHEDULED
Status: DISCONTINUED | OUTPATIENT
Start: 2025-05-25 | End: 2025-06-10 | Stop reason: HOSPADM

## 2025-05-25 RX ORDER — ROSUVASTATIN CALCIUM 10 MG/1
10 TABLET, COATED ORAL DAILY
Status: DISCONTINUED | OUTPATIENT
Start: 2025-05-25 | End: 2025-06-10 | Stop reason: HOSPADM

## 2025-05-25 RX ORDER — ACETAMINOPHEN 325 MG/1
650 TABLET ORAL EVERY 6 HOURS PRN
Status: DISCONTINUED | OUTPATIENT
Start: 2025-05-25 | End: 2025-06-10 | Stop reason: HOSPADM

## 2025-05-25 RX ORDER — SODIUM CHLORIDE 9 MG/ML
INJECTION, SOLUTION INTRAVENOUS PRN
Status: DISCONTINUED | OUTPATIENT
Start: 2025-05-25 | End: 2025-05-28

## 2025-05-25 RX ORDER — FLUCONAZOLE 100 MG/1
150 TABLET ORAL ONCE
Status: COMPLETED | OUTPATIENT
Start: 2025-05-25 | End: 2025-05-25

## 2025-05-25 RX ORDER — POTASSIUM CHLORIDE 7.45 MG/ML
10 INJECTION INTRAVENOUS PRN
Status: DISCONTINUED | OUTPATIENT
Start: 2025-05-25 | End: 2025-06-10 | Stop reason: HOSPADM

## 2025-05-25 RX ORDER — ONDANSETRON 2 MG/ML
4 INJECTION INTRAMUSCULAR; INTRAVENOUS EVERY 6 HOURS PRN
Status: DISCONTINUED | OUTPATIENT
Start: 2025-05-25 | End: 2025-06-10 | Stop reason: HOSPADM

## 2025-05-25 RX ORDER — POLYETHYLENE GLYCOL 3350 17 G/17G
17 POWDER, FOR SOLUTION ORAL DAILY PRN
Status: DISCONTINUED | OUTPATIENT
Start: 2025-05-25 | End: 2025-06-10 | Stop reason: HOSPADM

## 2025-05-25 RX ORDER — BUDESONIDE 1 MG/2ML
1 INHALANT ORAL
Status: DISCONTINUED | OUTPATIENT
Start: 2025-05-25 | End: 2025-05-25

## 2025-05-25 RX ORDER — MULTIVITAMIN WITH IRON
1 TABLET ORAL DAILY
Status: DISCONTINUED | OUTPATIENT
Start: 2025-05-25 | End: 2025-06-10 | Stop reason: HOSPADM

## 2025-05-25 RX ORDER — DEXTROAMPHETAMINE SACCHARATE, AMPHETAMINE ASPARTATE MONOHYDRATE, DEXTROAMPHETAMINE SULFATE AND AMPHETAMINE SULFATE 7.5; 7.5; 7.5; 7.5 MG/1; MG/1; MG/1; MG/1
30 CAPSULE, EXTENDED RELEASE ORAL EVERY MORNING
Refills: 0 | Status: DISCONTINUED | OUTPATIENT
Start: 2025-05-25 | End: 2025-05-25

## 2025-05-25 RX ORDER — ACETAMINOPHEN 650 MG/1
650 SUPPOSITORY RECTAL EVERY 6 HOURS PRN
Status: DISCONTINUED | OUTPATIENT
Start: 2025-05-25 | End: 2025-06-10 | Stop reason: HOSPADM

## 2025-05-25 RX ORDER — LACTOBACILLUS RHAMNOSUS GG 10B CELL
1 CAPSULE ORAL
Status: DISCONTINUED | OUTPATIENT
Start: 2025-05-25 | End: 2025-06-10 | Stop reason: HOSPADM

## 2025-05-25 RX ORDER — LEVOTHYROXINE SODIUM 200 UG/1
200 TABLET ORAL DAILY
Status: DISCONTINUED | OUTPATIENT
Start: 2025-05-25 | End: 2025-06-10 | Stop reason: HOSPADM

## 2025-05-25 RX ORDER — PANTOPRAZOLE SODIUM 40 MG/1
40 TABLET, DELAYED RELEASE ORAL
Status: DISCONTINUED | OUTPATIENT
Start: 2025-05-25 | End: 2025-06-10 | Stop reason: HOSPADM

## 2025-05-25 RX ORDER — VANCOMYCIN 1.5 G/300ML
1500 INJECTION, SOLUTION INTRAVENOUS ONCE
Status: COMPLETED | OUTPATIENT
Start: 2025-05-25 | End: 2025-05-25

## 2025-05-25 RX ORDER — MAGNESIUM SULFATE IN WATER 40 MG/ML
2000 INJECTION, SOLUTION INTRAVENOUS PRN
Status: DISCONTINUED | OUTPATIENT
Start: 2025-05-25 | End: 2025-06-10 | Stop reason: HOSPADM

## 2025-05-25 RX ORDER — SODIUM CHLORIDE 0.9 % (FLUSH) 0.9 %
5-40 SYRINGE (ML) INJECTION EVERY 12 HOURS SCHEDULED
Status: DISCONTINUED | OUTPATIENT
Start: 2025-05-25 | End: 2025-05-28

## 2025-05-25 RX ORDER — TRAMADOL HYDROCHLORIDE 50 MG/1
50 TABLET ORAL EVERY 4 HOURS PRN
Status: DISCONTINUED | OUTPATIENT
Start: 2025-05-25 | End: 2025-06-07

## 2025-05-25 RX ORDER — ONDANSETRON 4 MG/1
4 TABLET, ORALLY DISINTEGRATING ORAL EVERY 8 HOURS PRN
Status: DISCONTINUED | OUTPATIENT
Start: 2025-05-25 | End: 2025-06-10 | Stop reason: HOSPADM

## 2025-05-25 RX ORDER — POTASSIUM CHLORIDE 1500 MG/1
40 TABLET, EXTENDED RELEASE ORAL PRN
Status: DISCONTINUED | OUTPATIENT
Start: 2025-05-25 | End: 2025-06-10 | Stop reason: HOSPADM

## 2025-05-25 RX ORDER — MECLIZINE HCL 12.5 MG 12.5 MG/1
25 TABLET ORAL EVERY 8 HOURS PRN
Status: DISCONTINUED | OUTPATIENT
Start: 2025-05-25 | End: 2025-06-10 | Stop reason: HOSPADM

## 2025-05-25 RX ADMIN — SODIUM CHLORIDE, PRESERVATIVE FREE 10 ML: 5 INJECTION INTRAVENOUS at 22:02

## 2025-05-25 RX ADMIN — TRAMADOL HYDROCHLORIDE 50 MG: 50 TABLET, COATED ORAL at 02:51

## 2025-05-25 RX ADMIN — CEFEPIME 2000 MG: 2 INJECTION, POWDER, FOR SOLUTION INTRAVENOUS at 02:48

## 2025-05-25 RX ADMIN — Medication 1 CAPSULE: at 08:46

## 2025-05-25 RX ADMIN — LEVOTHYROXINE SODIUM 200 MCG: 200 TABLET ORAL at 06:43

## 2025-05-25 RX ADMIN — VANCOMYCIN 1500 MG: 1.5 INJECTION, SOLUTION INTRAVENOUS at 02:54

## 2025-05-25 RX ADMIN — THERA TABS 1 TABLET: TAB at 08:46

## 2025-05-25 RX ADMIN — ROSUVASTATIN CALCIUM 10 MG: 10 TABLET, FILM COATED ORAL at 08:46

## 2025-05-25 RX ADMIN — ACETAMINOPHEN 650 MG: 325 TABLET ORAL at 22:00

## 2025-05-25 RX ADMIN — SODIUM CHLORIDE, PRESERVATIVE FREE 10 ML: 5 INJECTION INTRAVENOUS at 10:19

## 2025-05-25 RX ADMIN — PANTOPRAZOLE SODIUM 40 MG: 40 TABLET, DELAYED RELEASE ORAL at 06:43

## 2025-05-25 RX ADMIN — TRAMADOL HYDROCHLORIDE 50 MG: 50 TABLET, COATED ORAL at 08:53

## 2025-05-25 RX ADMIN — ACETAMINOPHEN 650 MG: 325 TABLET ORAL at 17:28

## 2025-05-25 RX ADMIN — CEFEPIME 2000 MG: 2 INJECTION, POWDER, FOR SOLUTION INTRAVENOUS at 09:02

## 2025-05-25 RX ADMIN — SODIUM CHLORIDE, PRESERVATIVE FREE 10 ML: 5 INJECTION INTRAVENOUS at 10:20

## 2025-05-25 RX ADMIN — FLUCONAZOLE 150 MG: 100 TABLET ORAL at 17:22

## 2025-05-25 ASSESSMENT — PAIN DESCRIPTION - FREQUENCY
FREQUENCY: INTERMITTENT

## 2025-05-25 ASSESSMENT — PAIN - FUNCTIONAL ASSESSMENT
PAIN_FUNCTIONAL_ASSESSMENT: ACTIVITIES ARE NOT PREVENTED

## 2025-05-25 ASSESSMENT — PAIN SCALES - GENERAL
PAINLEVEL_OUTOF10: 4
PAINLEVEL_OUTOF10: 2
PAINLEVEL_OUTOF10: 0
PAINLEVEL_OUTOF10: 5
PAINLEVEL_OUTOF10: 0
PAINLEVEL_OUTOF10: 6
PAINLEVEL_OUTOF10: 0
PAINLEVEL_OUTOF10: 3
PAINLEVEL_OUTOF10: 6
PAINLEVEL_OUTOF10: 5
PAINLEVEL_OUTOF10: 0

## 2025-05-25 ASSESSMENT — PAIN DESCRIPTION - ORIENTATION
ORIENTATION: LEFT;RIGHT
ORIENTATION: RIGHT;LEFT;MID
ORIENTATION: LEFT;LOWER;MID
ORIENTATION: POSTERIOR
ORIENTATION: LOWER;LEFT;MID

## 2025-05-25 ASSESSMENT — PAIN DESCRIPTION - DESCRIPTORS
DESCRIPTORS: ACHING
DESCRIPTORS: DISCOMFORT

## 2025-05-25 ASSESSMENT — PAIN DESCRIPTION - LOCATION
LOCATION: ABDOMEN
LOCATION: SACRUM;BUTTOCKS
LOCATION: RIB CAGE
LOCATION: BUTTOCKS;BACK
LOCATION: RIB CAGE

## 2025-05-25 ASSESSMENT — PAIN DESCRIPTION - PAIN TYPE
TYPE: ACUTE PAIN

## 2025-05-25 ASSESSMENT — PAIN DESCRIPTION - ONSET
ONSET: GRADUAL
ONSET: GRADUAL

## 2025-05-25 NOTE — H&P
High-Dose (FLUZONE) 0.5 ML JAYESH injection TO BE ADMINISTERED BY PHARMACIST FOR IMMUNIZATION    Mohini Hernandez MD   Influenza Vac Split High-Dose (FLUZONE) 0.5 ML JAYESH injection TO BE ADMINISTERED BY PHARMACIST FOR IMMUNIZATION    Mohini Hernandez MD   levoFLOXacin (LEVAQUIN) 500 MG tablet     Mohini Hernandez MD   loperamide (IMODIUM) 2 MG capsule     Mohini Hernandez MD   meclizine (ANTIVERT) 12.5 MG tablet Take 1 tablet by mouth in the morning, at noon, in the evening, and at bedtime    Mohini Hernandez MD   meclizine (ANTIVERT) 25 MG tablet Take 1 tablet by mouth every 8 hours as needed    Mohini Hernandez MD   metroNIDAZOLE (FLAGYL) 500 MG tablet TAKE 1 TABLET BY MOUTH EVERY 8 HOURS FOR 10 DAYS    Mohini Hernandez MD   Multiple Vitamins-Minerals (PRESERVISION AREDS) CAPS Take 1 capsule by mouth 2 times daily    Mohini Hernandez MD   sodium-potassium-mag sulfate (SUPREP) 17.5-3.13-1.6 GM/177ML SOLN solution     Mohini Hernandez MD   nitrofurantoin (MACRODANTIN) 100 MG capsule     Mohini Hernandez MD   pantoprazole (PROTONIX) 40 MG tablet Take 1 tablet by mouth daily    Mohini Hernandez MD   pravastatin (PRAVACHOL) 40 MG tablet Take 1 tablet by mouth nightly    Mohini Hernandez MD   promethazine (PHENERGAN) 25 MG tablet     Mohini Hernandez MD   rosuvastatin (CRESTOR) 10 MG tablet Take 1 tablet by mouth daily    Mohini Hernandez MD   sucralfate (CARAFATE) 1 GM tablet     Mohini Hernandez MD   traMADol (ULTRAM) 50 MG tablet     Mohini Hernandez MD   valACYclovir (VALTREX) 1 g tablet     Mhoini Hernandez MD   Benzocaine-Menthol (CEPACOL) 6-10 MG LOZG lozenge Take 1 lozenge by mouth every 2 hours as needed for Sore Throat 3/30/25   Denys Coe MD   phenol 1.4 % LIQD mouth spray Take 1 spray by mouth every 2 hours as needed for Sore Throat 3/30/25   Denys Coe MD   ergocalciferol (ERGOCALCIFEROL) 1.25 MG

## 2025-05-26 LAB
ALBUMIN SERPL-MCNC: 1.8 G/DL (ref 3.4–5)
ALBUMIN/GLOB SERPL: 0.4 (ref 0.8–1.7)
ALP SERPL-CCNC: 138 U/L (ref 45–117)
ALT SERPL-CCNC: 17 U/L (ref 10–35)
ANION GAP SERPL CALC-SCNC: 10 MMOL/L (ref 3–18)
AST SERPL-CCNC: 33 U/L (ref 10–38)
BASOPHILS # BLD: 0.07 K/UL (ref 0–0.1)
BASOPHILS NFR BLD: 0.6 % (ref 0–2)
BILIRUB SERPL-MCNC: 0.3 MG/DL (ref 0.2–1)
BUN SERPL-MCNC: 11 MG/DL (ref 6–23)
BUN/CREAT SERPL: 24 (ref 12–20)
CALCIUM SERPL-MCNC: 9.3 MG/DL (ref 8.5–10.1)
CHLORIDE SERPL-SCNC: 100 MMOL/L (ref 98–107)
CO2 SERPL-SCNC: 23 MMOL/L (ref 21–32)
CREAT SERPL-MCNC: 0.44 MG/DL (ref 0.6–1.3)
DIFFERENTIAL METHOD BLD: ABNORMAL
EOSINOPHIL # BLD: 0.37 K/UL (ref 0–0.4)
EOSINOPHIL NFR BLD: 3.1 % (ref 0–5)
ERYTHROCYTE [DISTWIDTH] IN BLOOD BY AUTOMATED COUNT: 17.3 % (ref 11.6–14.5)
GLOBULIN SER CALC-MCNC: 4.5 G/DL (ref 2–4)
GLUCOSE SERPL-MCNC: 100 MG/DL (ref 74–108)
HCT VFR BLD AUTO: 28.6 % (ref 35–45)
HGB BLD-MCNC: 8.6 G/DL (ref 12–16)
IMM GRANULOCYTES # BLD AUTO: 0.09 K/UL (ref 0–0.04)
IMM GRANULOCYTES NFR BLD AUTO: 0.7 % (ref 0–0.5)
LDH SERPL L TO P-CCNC: 150 U/L (ref 81–231)
LYMPHOCYTES # BLD: 0.94 K/UL (ref 0.9–3.6)
LYMPHOCYTES NFR BLD: 7.8 % (ref 21–52)
MCH RBC QN AUTO: 26.9 PG (ref 24–34)
MCHC RBC AUTO-ENTMCNC: 30.1 G/DL (ref 31–37)
MCV RBC AUTO: 89.4 FL (ref 78–100)
MONOCYTES # BLD: 1.15 K/UL (ref 0.05–1.2)
MONOCYTES NFR BLD: 9.6 % (ref 3–10)
NEUTS SEG # BLD: 9.4 K/UL (ref 1.8–8)
NEUTS SEG NFR BLD: 78.2 % (ref 40–73)
NRBC # BLD: 0 K/UL (ref 0–0.01)
NRBC BLD-RTO: 0 PER 100 WBC
PLATELET # BLD AUTO: 604 K/UL (ref 135–420)
PMV BLD AUTO: 8.8 FL (ref 9.2–11.8)
POTASSIUM SERPL-SCNC: 3.6 MMOL/L (ref 3.5–5.5)
PROT SERPL-MCNC: 6.4 G/DL (ref 6.4–8.2)
RBC # BLD AUTO: 3.2 M/UL (ref 4.2–5.3)
SODIUM SERPL-SCNC: 134 MMOL/L (ref 136–145)
WBC # BLD AUTO: 12 K/UL (ref 4.6–13.2)

## 2025-05-26 PROCEDURE — 80053 COMPREHEN METABOLIC PANEL: CPT

## 2025-05-26 PROCEDURE — 97530 THERAPEUTIC ACTIVITIES: CPT

## 2025-05-26 PROCEDURE — 99232 SBSQ HOSP IP/OBS MODERATE 35: CPT | Performed by: STUDENT IN AN ORGANIZED HEALTH CARE EDUCATION/TRAINING PROGRAM

## 2025-05-26 PROCEDURE — 2500000003 HC RX 250 WO HCPCS: Performed by: STUDENT IN AN ORGANIZED HEALTH CARE EDUCATION/TRAINING PROGRAM

## 2025-05-26 PROCEDURE — 97116 GAIT TRAINING THERAPY: CPT

## 2025-05-26 PROCEDURE — 6360000002 HC RX W HCPCS: Performed by: INTERNAL MEDICINE

## 2025-05-26 PROCEDURE — 36415 COLL VENOUS BLD VENIPUNCTURE: CPT

## 2025-05-26 PROCEDURE — 6360000002 HC RX W HCPCS: Performed by: STUDENT IN AN ORGANIZED HEALTH CARE EDUCATION/TRAINING PROGRAM

## 2025-05-26 PROCEDURE — 2500000003 HC RX 250 WO HCPCS: Performed by: INTERNAL MEDICINE

## 2025-05-26 PROCEDURE — 99232 SBSQ HOSP IP/OBS MODERATE 35: CPT | Performed by: INTERNAL MEDICINE

## 2025-05-26 PROCEDURE — 6370000000 HC RX 637 (ALT 250 FOR IP): Performed by: INTERNAL MEDICINE

## 2025-05-26 PROCEDURE — 2140000001 HC CVICU INTERMEDIATE R&B

## 2025-05-26 PROCEDURE — 83615 LACTATE (LD) (LDH) ENZYME: CPT

## 2025-05-26 PROCEDURE — 85025 COMPLETE CBC W/AUTO DIFF WBC: CPT

## 2025-05-26 PROCEDURE — 97165 OT EVAL LOW COMPLEX 30 MIN: CPT

## 2025-05-26 PROCEDURE — 97161 PT EVAL LOW COMPLEX 20 MIN: CPT

## 2025-05-26 PROCEDURE — 94761 N-INVAS EAR/PLS OXIMETRY MLT: CPT

## 2025-05-26 RX ADMIN — ENOXAPARIN SODIUM 40 MG: 100 INJECTION SUBCUTANEOUS at 08:37

## 2025-05-26 RX ADMIN — Medication 1 CAPSULE: at 08:37

## 2025-05-26 RX ADMIN — TRAMADOL HYDROCHLORIDE 50 MG: 50 TABLET, COATED ORAL at 12:57

## 2025-05-26 RX ADMIN — SODIUM CHLORIDE, PRESERVATIVE FREE 10 ML: 5 INJECTION INTRAVENOUS at 21:14

## 2025-05-26 RX ADMIN — LEVOTHYROXINE SODIUM 200 MCG: 200 TABLET ORAL at 06:05

## 2025-05-26 RX ADMIN — KETOROLAC TROMETHAMINE 15 MG: 15 INJECTION, SOLUTION INTRAMUSCULAR; INTRAVENOUS at 21:12

## 2025-05-26 RX ADMIN — PANTOPRAZOLE SODIUM 40 MG: 40 TABLET, DELAYED RELEASE ORAL at 06:05

## 2025-05-26 RX ADMIN — SODIUM CHLORIDE, PRESERVATIVE FREE 10 ML: 5 INJECTION INTRAVENOUS at 08:37

## 2025-05-26 RX ADMIN — THERA TABS 1 TABLET: TAB at 08:36

## 2025-05-26 RX ADMIN — SODIUM CHLORIDE, PRESERVATIVE FREE 10 ML: 5 INJECTION INTRAVENOUS at 08:38

## 2025-05-26 RX ADMIN — ROSUVASTATIN CALCIUM 10 MG: 10 TABLET, FILM COATED ORAL at 08:37

## 2025-05-26 ASSESSMENT — PAIN DESCRIPTION - LOCATION
LOCATION: ABDOMEN;BUTTOCKS
LOCATION: RIB CAGE
LOCATION: RIB CAGE

## 2025-05-26 ASSESSMENT — PAIN DESCRIPTION - FREQUENCY
FREQUENCY: INTERMITTENT
FREQUENCY: INTERMITTENT

## 2025-05-26 ASSESSMENT — PAIN DESCRIPTION - PAIN TYPE
TYPE: ACUTE PAIN
TYPE: ACUTE PAIN

## 2025-05-26 ASSESSMENT — PAIN SCALES - GENERAL
PAINLEVEL_OUTOF10: 0
PAINLEVEL_OUTOF10: 4
PAINLEVEL_OUTOF10: 0
PAINLEVEL_OUTOF10: 5
PAINLEVEL_OUTOF10: 6
PAINLEVEL_OUTOF10: 2
PAINLEVEL_OUTOF10: 0

## 2025-05-26 ASSESSMENT — PAIN - FUNCTIONAL ASSESSMENT
PAIN_FUNCTIONAL_ASSESSMENT: ACTIVITIES ARE NOT PREVENTED
PAIN_FUNCTIONAL_ASSESSMENT: PREVENTS OR INTERFERES SOME ACTIVE ACTIVITIES AND ADLS
PAIN_FUNCTIONAL_ASSESSMENT: ACTIVITIES ARE NOT PREVENTED

## 2025-05-26 ASSESSMENT — PAIN DESCRIPTION - ORIENTATION
ORIENTATION: RIGHT;LEFT
ORIENTATION: LEFT
ORIENTATION: LEFT

## 2025-05-26 ASSESSMENT — PAIN DESCRIPTION - DESCRIPTORS
DESCRIPTORS: DISCOMFORT;DULL
DESCRIPTORS: ACHING
DESCRIPTORS: ACHING

## 2025-05-27 ENCOUNTER — APPOINTMENT (OUTPATIENT)
Facility: HOSPITAL | Age: 75
DRG: 177 | End: 2025-05-27
Attending: INTERNAL MEDICINE
Payer: MEDICARE

## 2025-05-27 ENCOUNTER — APPOINTMENT (OUTPATIENT)
Facility: HOSPITAL | Age: 75
DRG: 177 | End: 2025-05-27
Attending: STUDENT IN AN ORGANIZED HEALTH CARE EDUCATION/TRAINING PROGRAM
Payer: MEDICARE

## 2025-05-27 LAB
ANION GAP SERPL CALC-SCNC: 9 MMOL/L (ref 3–18)
APPEARANCE FLD: ABNORMAL
BASOPHILS # BLD: 0.05 K/UL (ref 0–0.1)
BASOPHILS NFR BLD: 0.5 % (ref 0–2)
BUN SERPL-MCNC: 10 MG/DL (ref 6–23)
BUN/CREAT SERPL: 23 (ref 12–20)
CALCIUM SERPL-MCNC: 8.8 MG/DL (ref 8.5–10.1)
CHLORIDE SERPL-SCNC: 98 MMOL/L (ref 98–107)
CO2 SERPL-SCNC: 25 MMOL/L (ref 21–32)
COLOR FLD: ABNORMAL
CREAT SERPL-MCNC: 0.42 MG/DL (ref 0.6–1.3)
CYTOLOGY-NON GYN: NORMAL
DIFFERENTIAL METHOD BLD: ABNORMAL
ECHO AO ASC DIAM: 3 CM
ECHO AO ASCENDING AORTA INDEX: 1.92 CM/M2
ECHO AO ROOT DIAM: 3 CM
ECHO AO ROOT INDEX: 1.92 CM/M2
ECHO AV AREA PEAK VELOCITY: 2.6 CM2
ECHO AV AREA VTI: 2.3 CM2
ECHO AV AREA/BSA PEAK VELOCITY: 1.7 CM2/M2
ECHO AV AREA/BSA VTI: 1.5 CM2/M2
ECHO AV MEAN GRADIENT: 4 MMHG
ECHO AV MEAN VELOCITY: 1 M/S
ECHO AV PEAK GRADIENT: 6 MMHG
ECHO AV PEAK VELOCITY: 1.3 M/S
ECHO AV VELOCITY RATIO: 0.69
ECHO AV VTI: 23 CM
ECHO BSA: 1.55 M2
ECHO EST RA PRESSURE: 3 MMHG
ECHO LA VOL A-L A2C: 55 ML (ref 22–52)
ECHO LA VOL A-L A4C: 42 ML (ref 22–52)
ECHO LA VOL MOD A2C: 48 ML (ref 22–52)
ECHO LA VOL MOD A4C: 37 ML (ref 22–52)
ECHO LA VOLUME AREA LENGTH: 49 ML
ECHO LA VOLUME INDEX A-L A2C: 35 ML/M2 (ref 16–34)
ECHO LA VOLUME INDEX A-L A4C: 27 ML/M2 (ref 16–34)
ECHO LA VOLUME INDEX AREA LENGTH: 31 ML/M2 (ref 16–34)
ECHO LA VOLUME INDEX MOD A2C: 31 ML/M2 (ref 16–34)
ECHO LA VOLUME INDEX MOD A4C: 24 ML/M2 (ref 16–34)
ECHO LV EDV A2C: 66 ML
ECHO LV EDV A4C: 72 ML
ECHO LV EDV BP: 70 ML (ref 56–104)
ECHO LV EDV INDEX A4C: 46 ML/M2
ECHO LV EDV INDEX BP: 45 ML/M2
ECHO LV EDV NDEX A2C: 42 ML/M2
ECHO LV EF PHYSICIAN: 50 %
ECHO LV EJECTION FRACTION A2C: 51 %
ECHO LV EJECTION FRACTION A4C: 54 %
ECHO LV EJECTION FRACTION BIPLANE: 53 % (ref 55–100)
ECHO LV ESV A2C: 32 ML
ECHO LV ESV A4C: 33 ML
ECHO LV ESV BP: 33 ML (ref 19–49)
ECHO LV ESV INDEX A2C: 21 ML/M2
ECHO LV ESV INDEX A4C: 21 ML/M2
ECHO LV ESV INDEX BP: 21 ML/M2
ECHO LV FRACTIONAL SHORTENING: 27 % (ref 28–44)
ECHO LV INTERNAL DIMENSION DIASTOLE INDEX: 2.88 CM/M2
ECHO LV INTERNAL DIMENSION DIASTOLIC: 4.5 CM (ref 3.9–5.3)
ECHO LV INTERNAL DIMENSION SYSTOLIC INDEX: 2.12 CM/M2
ECHO LV INTERNAL DIMENSION SYSTOLIC: 3.3 CM
ECHO LV IVSD: 0.9 CM (ref 0.6–0.9)
ECHO LV MASS 2D: 132.8 G (ref 67–162)
ECHO LV MASS INDEX 2D: 85.1 G/M2 (ref 43–95)
ECHO LV POSTERIOR WALL DIASTOLIC: 0.9 CM (ref 0.6–0.9)
ECHO LV RELATIVE WALL THICKNESS RATIO: 0.4
ECHO LVOT AREA: 3.8 CM2
ECHO LVOT AV VTI INDEX: 0.63
ECHO LVOT DIAM: 2.2 CM
ECHO LVOT MEAN GRADIENT: 2 MMHG
ECHO LVOT PEAK GRADIENT: 3 MMHG
ECHO LVOT PEAK VELOCITY: 0.9 M/S
ECHO LVOT STROKE VOLUME INDEX: 35.1 ML/M2
ECHO LVOT SV: 54.7 ML
ECHO LVOT VTI: 14.4 CM
ECHO MV AREA VTI: 2.8 CM2
ECHO MV LVOT VTI INDEX: 1.34
ECHO MV MAX VELOCITY: 1.1 M/S
ECHO MV MEAN GRADIENT: 2 MMHG
ECHO MV MEAN VELOCITY: 0.7 M/S
ECHO MV PEAK GRADIENT: 4 MMHG
ECHO MV VTI: 19.3 CM
ECHO PV MAX VELOCITY: 1.1 M/S
ECHO PV PEAK GRADIENT: 5 MMHG
ECHO RA AREA 4C: 13.6 CM2
ECHO RA END SYSTOLIC VOLUME APICAL 4 CHAMBER INDEX BSA: 21 ML/M2
ECHO RA VOLUME AREA LENGTH APICAL 4 CHAMBER: 34 ML
ECHO RA VOLUME: 33 ML
ECHO RIGHT VENTRICULAR SYSTOLIC PRESSURE (RVSP): 29 MMHG
ECHO RV BASAL DIMENSION: 4.2 CM
ECHO RV FREE WALL PEAK S': 17 CM/S
ECHO RV MID DIMENSION: 2.6 CM
ECHO RV TAPSE: 2.3 CM (ref 1.7–?)
ECHO RVOT PEAK GRADIENT: 3 MMHG
ECHO RVOT PEAK VELOCITY: 0.9 M/S
ECHO TV REGURGITANT MAX VELOCITY: 2.54 M/S
ECHO TV REGURGITANT PEAK GRADIENT: 26 MMHG
EOSINOPHIL # BLD: 0.27 K/UL (ref 0–0.4)
EOSINOPHIL NFR BLD: 2.5 % (ref 0–5)
EOSINOPHIL NFR FLD MANUAL: 0 %
ERYTHROCYTE [DISTWIDTH] IN BLOOD BY AUTOMATED COUNT: 17.2 % (ref 11.6–14.5)
GLUCOSE SERPL-MCNC: 101 MG/DL (ref 74–108)
HCT VFR BLD AUTO: 27.1 % (ref 35–45)
HGB BLD-MCNC: 8.1 G/DL (ref 12–16)
IMM GRANULOCYTES # BLD AUTO: 0.09 K/UL (ref 0–0.04)
IMM GRANULOCYTES NFR BLD AUTO: 0.8 % (ref 0–0.5)
LYMPHOCYTES # BLD: 0.93 K/UL (ref 0.9–3.6)
LYMPHOCYTES NFR BLD: 8.6 % (ref 21–52)
LYMPHOCYTES NFR FLD: 2 %
MCH RBC QN AUTO: 26.1 PG (ref 24–34)
MCHC RBC AUTO-ENTMCNC: 29.9 G/DL (ref 31–37)
MCV RBC AUTO: 87.4 FL (ref 78–100)
MONOCYTES # BLD: 1.02 K/UL (ref 0.05–1.2)
MONOCYTES NFR BLD: 9.4 % (ref 3–10)
MONOCYTES NFR FLD: 1 %
NEUTROPHILS NFR FLD: 97 % (ref 0–25)
NEUTS BAND # FLD: 0 %
NEUTS SEG # BLD: 8.44 K/UL (ref 1.8–8)
NEUTS SEG NFR BLD: 78.2 % (ref 40–73)
NRBC # BLD: 0 K/UL (ref 0–0.01)
NRBC BLD-RTO: 0 PER 100 WBC
NUC CELL # FLD: ABNORMAL /CU MM
PLATELET # BLD AUTO: 617 K/UL (ref 135–420)
PMV BLD AUTO: 8.8 FL (ref 9.2–11.8)
POTASSIUM SERPL-SCNC: 3.4 MMOL/L (ref 3.5–5.5)
RBC # BLD AUTO: 3.1 M/UL (ref 4.2–5.3)
RBC # FLD: ABNORMAL /CU MM
SODIUM SERPL-SCNC: 131 MMOL/L (ref 136–145)
SPECIMEN SOURCE FLD: ABNORMAL
WBC # BLD AUTO: 10.8 K/UL (ref 4.6–13.2)

## 2025-05-27 PROCEDURE — 82945 GLUCOSE OTHER FLUID: CPT

## 2025-05-27 PROCEDURE — 93306 TTE W/DOPPLER COMPLETE: CPT | Performed by: INTERNAL MEDICINE

## 2025-05-27 PROCEDURE — 83986 ASSAY PH BODY FLUID NOS: CPT

## 2025-05-27 PROCEDURE — 2140000001 HC CVICU INTERMEDIATE R&B

## 2025-05-27 PROCEDURE — 89051 BODY FLUID CELL COUNT: CPT

## 2025-05-27 PROCEDURE — 87206 SMEAR FLUORESCENT/ACID STAI: CPT

## 2025-05-27 PROCEDURE — 87102 FUNGUS ISOLATION CULTURE: CPT

## 2025-05-27 PROCEDURE — 36415 COLL VENOUS BLD VENIPUNCTURE: CPT

## 2025-05-27 PROCEDURE — 87186 SC STD MICRODIL/AGAR DIL: CPT

## 2025-05-27 PROCEDURE — 87116 MYCOBACTERIA CULTURE: CPT

## 2025-05-27 PROCEDURE — 93306 TTE W/DOPPLER COMPLETE: CPT

## 2025-05-27 PROCEDURE — 87205 SMEAR GRAM STAIN: CPT

## 2025-05-27 PROCEDURE — 84157 ASSAY OF PROTEIN OTHER: CPT

## 2025-05-27 PROCEDURE — 87077 CULTURE AEROBIC IDENTIFY: CPT

## 2025-05-27 PROCEDURE — 87147 CULTURE TYPE IMMUNOLOGIC: CPT

## 2025-05-27 PROCEDURE — 83615 LACTATE (LD) (LDH) ENZYME: CPT

## 2025-05-27 PROCEDURE — 99232 SBSQ HOSP IP/OBS MODERATE 35: CPT | Performed by: STUDENT IN AN ORGANIZED HEALTH CARE EDUCATION/TRAINING PROGRAM

## 2025-05-27 PROCEDURE — 6360000002 HC RX W HCPCS: Performed by: INTERNAL MEDICINE

## 2025-05-27 PROCEDURE — 99232 SBSQ HOSP IP/OBS MODERATE 35: CPT | Performed by: INTERNAL MEDICINE

## 2025-05-27 PROCEDURE — 0W9B3ZZ DRAINAGE OF LEFT PLEURAL CAVITY, PERCUTANEOUS APPROACH: ICD-10-PCS | Performed by: PHYSICIAN ASSISTANT

## 2025-05-27 PROCEDURE — 2580000003 HC RX 258: Performed by: INTERNAL MEDICINE

## 2025-05-27 PROCEDURE — 88112 CYTOPATH CELL ENHANCE TECH: CPT

## 2025-05-27 PROCEDURE — 6370000000 HC RX 637 (ALT 250 FOR IP): Performed by: INTERNAL MEDICINE

## 2025-05-27 PROCEDURE — 2500000003 HC RX 250 WO HCPCS: Performed by: STUDENT IN AN ORGANIZED HEALTH CARE EDUCATION/TRAINING PROGRAM

## 2025-05-27 PROCEDURE — 80048 BASIC METABOLIC PNL TOTAL CA: CPT

## 2025-05-27 PROCEDURE — 87070 CULTURE OTHR SPECIMN AEROBIC: CPT

## 2025-05-27 PROCEDURE — 88305 TISSUE EXAM BY PATHOLOGIST: CPT

## 2025-05-27 PROCEDURE — 85025 COMPLETE CBC W/AUTO DIFF WBC: CPT

## 2025-05-27 PROCEDURE — 2709999900 US THORACENTESIS

## 2025-05-27 PROCEDURE — 2500000003 HC RX 250 WO HCPCS: Performed by: INTERNAL MEDICINE

## 2025-05-27 PROCEDURE — 71045 X-RAY EXAM CHEST 1 VIEW: CPT

## 2025-05-27 RX ADMIN — THERA TABS 1 TABLET: TAB at 08:56

## 2025-05-27 RX ADMIN — ROSUVASTATIN CALCIUM 10 MG: 10 TABLET, FILM COATED ORAL at 08:57

## 2025-05-27 RX ADMIN — SODIUM CHLORIDE, PRESERVATIVE FREE 10 ML: 5 INJECTION INTRAVENOUS at 21:09

## 2025-05-27 RX ADMIN — LEVOTHYROXINE SODIUM 200 MCG: 200 TABLET ORAL at 06:14

## 2025-05-27 RX ADMIN — PIPERACILLIN AND TAZOBACTAM 4500 MG: 4; .5 INJECTION, POWDER, FOR SOLUTION INTRAVENOUS at 16:48

## 2025-05-27 RX ADMIN — PANTOPRAZOLE SODIUM 40 MG: 40 TABLET, DELAYED RELEASE ORAL at 06:14

## 2025-05-27 RX ADMIN — Medication 1 CAPSULE: at 08:56

## 2025-05-27 RX ADMIN — ENOXAPARIN SODIUM 40 MG: 100 INJECTION SUBCUTANEOUS at 08:57

## 2025-05-27 RX ADMIN — SODIUM CHLORIDE, PRESERVATIVE FREE 10 ML: 5 INJECTION INTRAVENOUS at 08:59

## 2025-05-27 RX ADMIN — ACETAMINOPHEN 650 MG: 325 TABLET ORAL at 21:26

## 2025-05-27 RX ADMIN — PIPERACILLIN AND TAZOBACTAM 3375 MG: 3; .375 INJECTION, POWDER, LYOPHILIZED, FOR SOLUTION INTRAVENOUS at 21:19

## 2025-05-27 RX ADMIN — VANCOMYCIN HYDROCHLORIDE 1250 MG: 10 INJECTION, POWDER, LYOPHILIZED, FOR SOLUTION INTRAVENOUS at 21:08

## 2025-05-27 RX ADMIN — POTASSIUM CHLORIDE 40 MEQ: 1500 TABLET, EXTENDED RELEASE ORAL at 18:21

## 2025-05-27 ASSESSMENT — PAIN - FUNCTIONAL ASSESSMENT: PAIN_FUNCTIONAL_ASSESSMENT: ACTIVITIES ARE NOT PREVENTED

## 2025-05-27 ASSESSMENT — PAIN SCALES - GENERAL
PAINLEVEL_OUTOF10: 0
PAINLEVEL_OUTOF10: 5
PAINLEVEL_OUTOF10: 2
PAINLEVEL_OUTOF10: 0

## 2025-05-27 ASSESSMENT — PAIN DESCRIPTION - ORIENTATION: ORIENTATION: LEFT;RIGHT

## 2025-05-27 ASSESSMENT — PAIN DESCRIPTION - DESCRIPTORS: DESCRIPTORS: ACHING

## 2025-05-27 ASSESSMENT — PAIN DESCRIPTION - LOCATION: LOCATION: ABDOMEN

## 2025-05-27 ASSESSMENT — PAIN DESCRIPTION - ONSET: ONSET: GRADUAL

## 2025-05-27 ASSESSMENT — PAIN DESCRIPTION - FREQUENCY: FREQUENCY: INTERMITTENT

## 2025-05-27 ASSESSMENT — PAIN DESCRIPTION - PAIN TYPE: TYPE: ACUTE PAIN

## 2025-05-27 NOTE — PROCEDURES
PROCEDURE NOTE  Date: 5/27/2025   Name: Cori Dennis  YOB: 1950    Procedures      RADIOLOGY POST THORACENTESIS NOTE     May 27, 2025       3:22 PM     :  GEOVANNA Weiss    Assistant:  None.    Attending: Dr. Moctezuma present for critical portions of the procedure    Procedure:  US-guided left thoracentesis     Pre-operative Diagnosis: Pleural effusion    Post-operative Diagnosis: same    Procedure Details: Informed consent obtained. Under ultrasound guidance, the largest pocket of fluid collection was identified. Percutaneous access was achieved using a posterior intercostal approach with a one step needle. 1% lidocaine was utilized for local anesthesia. The 5 [place] Yueh sheathed needle connected to manually controlled vacuum bottle.     70 cc of tan/pink fluid removed.    Images saved in PACS.         Complications:  No immediate.      Specimens: Yes    Estimated blood Loss: Minimal               Condition: Stable.    Impression:  Technically successful left thoracentesis of 70 mL purulent appearing fluid. Residual densely loculated left pleural effusion remains post procedure.    Plan: Post procedure chest xray pending.  Left chest tube placement planned for tomorrow after discussing with Dr. Weiss

## 2025-05-28 ENCOUNTER — APPOINTMENT (OUTPATIENT)
Facility: HOSPITAL | Age: 75
DRG: 177 | End: 2025-05-28
Attending: INTERNAL MEDICINE
Payer: MEDICARE

## 2025-05-28 ENCOUNTER — HOSPITAL ENCOUNTER (INPATIENT)
Facility: HOSPITAL | Age: 75
Discharge: HOME | DRG: 177 | End: 2025-05-31
Attending: INTERNAL MEDICINE
Payer: MEDICARE

## 2025-05-28 VITALS
RESPIRATION RATE: 20 BRPM | DIASTOLIC BLOOD PRESSURE: 63 MMHG | HEART RATE: 91 BPM | SYSTOLIC BLOOD PRESSURE: 111 MMHG | OXYGEN SATURATION: 99 %

## 2025-05-28 LAB
ANION GAP SERPL CALC-SCNC: 9 MMOL/L (ref 3–18)
BASOPHILS # BLD: 0.05 K/UL (ref 0–0.1)
BASOPHILS NFR BLD: 0.4 % (ref 0–2)
BUN SERPL-MCNC: 9 MG/DL (ref 6–23)
BUN/CREAT SERPL: 20 (ref 12–20)
C3 SERPL-MCNC: 196 MG/DL (ref 82–167)
C4 SERPL-MCNC: 32 MG/DL (ref 12–38)
CALCIUM SERPL-MCNC: 8.9 MG/DL (ref 8.5–10.1)
CHLORIDE SERPL-SCNC: 102 MMOL/L (ref 98–107)
CO2 SERPL-SCNC: 23 MMOL/L (ref 21–32)
CREAT SERPL-MCNC: 0.45 MG/DL (ref 0.6–1.3)
DIFFERENTIAL METHOD BLD: ABNORMAL
EOSINOPHIL # BLD: 0.22 K/UL (ref 0–0.4)
EOSINOPHIL NFR BLD: 2 % (ref 0–5)
ERYTHROCYTE [DISTWIDTH] IN BLOOD BY AUTOMATED COUNT: 17.1 % (ref 11.6–14.5)
GLUCOSE SERPL-MCNC: 102 MG/DL (ref 74–108)
HCT VFR BLD AUTO: 29.2 % (ref 35–45)
HGB BLD-MCNC: 8.7 G/DL (ref 12–16)
IMM GRANULOCYTES # BLD AUTO: 0.1 K/UL (ref 0–0.04)
IMM GRANULOCYTES NFR BLD AUTO: 0.9 % (ref 0–0.5)
LYMPHOCYTES # BLD: 1.08 K/UL (ref 0.9–3.6)
LYMPHOCYTES NFR BLD: 9.7 % (ref 21–52)
MCH RBC QN AUTO: 26.3 PG (ref 24–34)
MCHC RBC AUTO-ENTMCNC: 29.8 G/DL (ref 31–37)
MCV RBC AUTO: 88.2 FL (ref 78–100)
MONOCYTES # BLD: 0.77 K/UL (ref 0.05–1.2)
MONOCYTES NFR BLD: 6.9 % (ref 3–10)
NEUTS SEG # BLD: 8.91 K/UL (ref 1.8–8)
NEUTS SEG NFR BLD: 80.1 % (ref 40–73)
NRBC # BLD: 0 K/UL (ref 0–0.01)
NRBC BLD-RTO: 0 PER 100 WBC
PLATELET # BLD AUTO: 645 K/UL (ref 135–420)
PMV BLD AUTO: 8.6 FL (ref 9.2–11.8)
POTASSIUM SERPL-SCNC: 4 MMOL/L (ref 3.5–5.5)
RBC # BLD AUTO: 3.31 M/UL (ref 4.2–5.3)
SODIUM SERPL-SCNC: 133 MMOL/L (ref 136–145)
VANCOMYCIN SERPL-MCNC: 12.2 UG/ML (ref 5–40)
WBC # BLD AUTO: 11.1 K/UL (ref 4.6–13.2)

## 2025-05-28 PROCEDURE — 6360000002 HC RX W HCPCS: Performed by: PHYSICIAN ASSISTANT

## 2025-05-28 PROCEDURE — 2140000001 HC CVICU INTERMEDIATE R&B

## 2025-05-28 PROCEDURE — 97530 THERAPEUTIC ACTIVITIES: CPT

## 2025-05-28 PROCEDURE — 97110 THERAPEUTIC EXERCISES: CPT

## 2025-05-28 PROCEDURE — 6360000002 HC RX W HCPCS: Performed by: STUDENT IN AN ORGANIZED HEALTH CARE EDUCATION/TRAINING PROGRAM

## 2025-05-28 PROCEDURE — 85025 COMPLETE CBC W/AUTO DIFF WBC: CPT

## 2025-05-28 PROCEDURE — 6360000002 HC RX W HCPCS: Performed by: INTERNAL MEDICINE

## 2025-05-28 PROCEDURE — 36415 COLL VENOUS BLD VENIPUNCTURE: CPT

## 2025-05-28 PROCEDURE — 6370000000 HC RX 637 (ALT 250 FOR IP): Performed by: INTERNAL MEDICINE

## 2025-05-28 PROCEDURE — 99232 SBSQ HOSP IP/OBS MODERATE 35: CPT | Performed by: STUDENT IN AN ORGANIZED HEALTH CARE EDUCATION/TRAINING PROGRAM

## 2025-05-28 PROCEDURE — APPSS15 APP SPLIT SHARED TIME 0-15 MINUTES

## 2025-05-28 PROCEDURE — 99232 SBSQ HOSP IP/OBS MODERATE 35: CPT | Performed by: INTERNAL MEDICINE

## 2025-05-28 PROCEDURE — 32551 INSERTION OF CHEST TUBE: CPT

## 2025-05-28 PROCEDURE — 97535 SELF CARE MNGMENT TRAINING: CPT

## 2025-05-28 PROCEDURE — 2580000003 HC RX 258: Performed by: INTERNAL MEDICINE

## 2025-05-28 PROCEDURE — 0W9B30Z DRAINAGE OF LEFT PLEURAL CAVITY WITH DRAINAGE DEVICE, PERCUTANEOUS APPROACH: ICD-10-PCS | Performed by: PHYSICIAN ASSISTANT

## 2025-05-28 PROCEDURE — 99152 MOD SED SAME PHYS/QHP 5/>YRS: CPT

## 2025-05-28 PROCEDURE — 2500000003 HC RX 250 WO HCPCS: Performed by: INTERNAL MEDICINE

## 2025-05-28 PROCEDURE — 71045 X-RAY EXAM CHEST 1 VIEW: CPT

## 2025-05-28 PROCEDURE — 80048 BASIC METABOLIC PNL TOTAL CA: CPT

## 2025-05-28 PROCEDURE — 80202 ASSAY OF VANCOMYCIN: CPT

## 2025-05-28 RX ORDER — MIDAZOLAM HYDROCHLORIDE 2 MG/2ML
INJECTION, SOLUTION INTRAMUSCULAR; INTRAVENOUS PRN
Status: COMPLETED | OUTPATIENT
Start: 2025-05-28 | End: 2025-05-28

## 2025-05-28 RX ORDER — LIDOCAINE HYDROCHLORIDE 10 MG/ML
INJECTION, SOLUTION EPIDURAL; INFILTRATION; INTRACAUDAL; PERINEURAL PRN
Status: COMPLETED | OUTPATIENT
Start: 2025-05-28 | End: 2025-05-28

## 2025-05-28 RX ORDER — FENTANYL CITRATE 50 UG/ML
INJECTION, SOLUTION INTRAMUSCULAR; INTRAVENOUS PRN
Status: COMPLETED | OUTPATIENT
Start: 2025-05-28 | End: 2025-05-28

## 2025-05-28 RX ADMIN — VANCOMYCIN HYDROCHLORIDE 1000 MG: 1 INJECTION, POWDER, LYOPHILIZED, FOR SOLUTION INTRAVENOUS at 09:20

## 2025-05-28 RX ADMIN — KETOROLAC TROMETHAMINE 15 MG: 15 INJECTION, SOLUTION INTRAMUSCULAR; INTRAVENOUS at 21:24

## 2025-05-28 RX ADMIN — FENTANYL CITRATE 50 MCG: 50 INJECTION INTRAMUSCULAR; INTRAVENOUS at 10:55

## 2025-05-28 RX ADMIN — THERA TABS 1 TABLET: TAB at 08:51

## 2025-05-28 RX ADMIN — PANTOPRAZOLE SODIUM 40 MG: 40 TABLET, DELAYED RELEASE ORAL at 05:01

## 2025-05-28 RX ADMIN — PIPERACILLIN AND TAZOBACTAM 3375 MG: 3; .375 INJECTION, POWDER, LYOPHILIZED, FOR SOLUTION INTRAVENOUS at 22:59

## 2025-05-28 RX ADMIN — SODIUM CHLORIDE, PRESERVATIVE FREE 10 ML: 5 INJECTION INTRAVENOUS at 21:22

## 2025-05-28 RX ADMIN — SODIUM CHLORIDE, PRESERVATIVE FREE 10 ML: 5 INJECTION INTRAVENOUS at 08:54

## 2025-05-28 RX ADMIN — VANCOMYCIN HYDROCHLORIDE 1000 MG: 1 INJECTION, POWDER, LYOPHILIZED, FOR SOLUTION INTRAVENOUS at 21:21

## 2025-05-28 RX ADMIN — LIDOCAINE HYDROCHLORIDE 5 ML: 10 INJECTION, SOLUTION EPIDURAL; INFILTRATION; INTRACAUDAL; PERINEURAL at 10:57

## 2025-05-28 RX ADMIN — MIDAZOLAM HYDROCHLORIDE 0.5 MG: 1 INJECTION, SOLUTION INTRAMUSCULAR; INTRAVENOUS at 11:02

## 2025-05-28 RX ADMIN — FENTANYL CITRATE 25 MCG: 50 INJECTION INTRAMUSCULAR; INTRAVENOUS at 11:02

## 2025-05-28 RX ADMIN — PIPERACILLIN AND TAZOBACTAM 3375 MG: 3; .375 INJECTION, POWDER, LYOPHILIZED, FOR SOLUTION INTRAVENOUS at 04:59

## 2025-05-28 RX ADMIN — ROSUVASTATIN CALCIUM 10 MG: 10 TABLET, FILM COATED ORAL at 08:51

## 2025-05-28 RX ADMIN — Medication 1 CAPSULE: at 08:51

## 2025-05-28 RX ADMIN — PIPERACILLIN AND TAZOBACTAM 3375 MG: 3; .375 INJECTION, POWDER, LYOPHILIZED, FOR SOLUTION INTRAVENOUS at 13:05

## 2025-05-28 RX ADMIN — MIDAZOLAM HYDROCHLORIDE 1 MG: 1 INJECTION, SOLUTION INTRAMUSCULAR; INTRAVENOUS at 10:55

## 2025-05-28 RX ADMIN — LEVOTHYROXINE SODIUM 200 MCG: 200 TABLET ORAL at 05:01

## 2025-05-28 ASSESSMENT — PAIN SCALES - GENERAL
PAINLEVEL_OUTOF10: 0
PAINLEVEL_OUTOF10: 6
PAINLEVEL_OUTOF10: 0

## 2025-05-28 ASSESSMENT — PAIN DESCRIPTION - ORIENTATION: ORIENTATION: LEFT

## 2025-05-28 ASSESSMENT — PAIN DESCRIPTION - PAIN TYPE: TYPE: ACUTE PAIN

## 2025-05-28 ASSESSMENT — PAIN DESCRIPTION - LOCATION: LOCATION: CHEST

## 2025-05-28 ASSESSMENT — PAIN DESCRIPTION - DESCRIPTORS: DESCRIPTORS: ACHING

## 2025-05-28 ASSESSMENT — PAIN - FUNCTIONAL ASSESSMENT: PAIN_FUNCTIONAL_ASSESSMENT: PREVENTS OR INTERFERES SOME ACTIVE ACTIVITIES AND ADLS

## 2025-05-28 ASSESSMENT — PAIN DESCRIPTION - FREQUENCY: FREQUENCY: INTERMITTENT

## 2025-05-28 ASSESSMENT — PAIN DESCRIPTION - ONSET: ONSET: GRADUAL

## 2025-05-28 NOTE — PROGRESS NOTES
Preprocedure Assessment      Today 5/28/2025     Indication/Symptoms:   Cori Dennis is a 75 y.o. here for left chest tube placement for empyema    The H & P and/or progress notes and any available imaging were reviewed.  The risks, indications and possible alternatives to the procedure, including doing nothing, were discussed and informed consent was obtained.    Physical Exam:    ASA  III  Mallampati  III      Heart:   RRR   Lungs:   diminished LT greater than RT coarse breath sounds.    The patient is an appropriate candidate to undergo the planned procedure and sedation.    Patient was immediately reassessed prior to procedural sedation and continues to be a candidate for sedation     CONCEPCION JOHNSON PA-C

## 2025-05-28 NOTE — PROGRESS NOTES
TRANSFER - OUT REPORT:    Verbal report given to Sri DRISCOLL on Cori Dennis  being transferred to  for routine post-op       Report consisted of patient's Situation, Background, Assessment and   Recommendations(SBAR).     Information from the following report(s) Nurse Handoff Report was reviewed with the receiving nurse.           Lines:   Peripheral IV 05/27/25 Left;Anterior Cephalic (Active)   Site Assessment Clean, dry & intact 05/27/25 1623   Line Status Blood return noted;Capped;Flushed 05/27/25 1623   Line Care Cap changed;Connections checked and tightened;Line pulled back;Ports disinfected 05/27/25 1623   Phlebitis Assessment No symptoms 05/27/25 1623   Infiltration Assessment 0 05/27/25 1623   Alcohol Cap Used Yes 05/27/25 1623   Dressing Status New dressing applied 05/27/25 1623   Dressing Type Transparent 05/27/25 1623   Dressing Intervention New 05/27/25 1623       Peripheral IV 05/27/25 Right;Ventral Wrist (Active)   Site Assessment Clean, dry & intact 05/27/25 1625   Line Status Blood return noted;Capped;Flushed;Heparin locked 05/27/25 1625   Line Care Cap changed;Connections checked and tightened;Line pulled back 05/27/25 1625   Phlebitis Assessment No symptoms 05/27/25 1625   Infiltration Assessment 0 05/27/25 1625   Alcohol Cap Used Yes 05/27/25 1625   Dressing Status New dressing applied 05/27/25 1625   Dressing Type Transparent 05/27/25 1625        Opportunity for questions and clarification was provided.      Patient transported with:  Tech

## 2025-05-28 NOTE — PROCEDURES
RADIOLOGY POST PROCEDURE NOTE     May 28, 2025       11:06 AM     Preoperative Diagnosis:   Empyema    Postoperative Diagnosis:  Same.    :  CONCEPCION JOHNSON PA-C    Assistant:  None.    Type of Anesthesia: 1% plain lidocaine, moderate sedation    Procedure/Description:  Left chest tube placement    Findings:   Successful Left chest tube 18 fr thal quick return of purulent fluid.    Please keep chest tube to 20 cm wall suction    Estimated blood Loss:  Minimal    Specimen Removed:    None requested    Blood transfusions:  None.    Implants:  None.    Complications: None    Condition: Stable    Discharge Plan:   Return to nursing unit    CONCEPCION JOHNSON PA-C

## 2025-05-28 NOTE — CONSULTS
Room #: 363      ANTONY: [id number]      Situation: Wound Care Consult    Background:    PMH:   Active Ambulatory Problems     Diagnosis Date Noted    OA (osteoarthritis) of knee 06/06/2014    Acquired hypothyroidism 08/06/2020    Medium vessel vasculitis 04/19/2021    Hyperlipidemia 08/06/2020    Anxiety disorder 08/06/2020    Essential hypertension 08/06/2020    Attention-deficit hyperactivity disorder, predominantly inattentive type 08/06/2020    Varicose veins of lower extremity 08/06/2020    Peptic ulcer 08/06/2020    Dyspnea 03/24/2025    Empyema of lung (HCC) 03/25/2025    Abscess of lower lobe of left lung with pneumonia (HCC) 03/26/2025    Leukocytosis 03/26/2025    Oral candidiasis 03/27/2025    Pleural effusion on left 03/27/2025     Resolved Ambulatory Problems     Diagnosis Date Noted    No Resolved Ambulatory Problems     Past Medical History:   Diagnosis Date    Anxiety     Raynaud's disease         Willis Score: 19/23   BMI: Body mass index is 19.14 kg/m².   Preventive measures in place: limited layers    Assessment:   Patient found supine.  Patient is Awake and alert, Oriented x person, place, time and situation, and Pleasant and conversant. Reports that this wound began at home and she knows it was due to pressure.  She has a history of gastric bypass but reports a very recent weight loss of 30lbs due to \"food aversion\".      Wound(s) Description:           Wound 05/25/25 Sacrum (Active)   Wound Image   05/27/25 1117   Wound Etiology Pressure Unstageable 05/27/25 1117   Dressing Status Reinforced dressing 05/27/25 1117   Wound Cleansed Not Cleansed 05/27/25 0850   Dressing/Treatment Silicone border 05/27/25 1117   Wound Length (cm) 2.5 cm 05/27/25 1117   Wound Width (cm) 0.9 cm 05/27/25 1117   Wound Surface Area (cm^2) 2.25 cm^2 05/27/25 1117   Wound Assessment Slough 05/27/25 1117   Drainage Amount Small (< 25%) 05/27/25 1117   Drainage Description Serous 05/27/25 1117   Odor None 05/27/25 1117 
  Psychiatric/Behavioral: Negative.          Vitals:    05/25/25 1130   BP: 123/65   Pulse: 96   Resp: 22   Temp: 97.9 °F (36.6 °C)   SpO2: 95%        Physical Exam  Constitutional:       Appearance: She is cachectic. She is ill-appearing. She is not toxic-appearing or diaphoretic.   HENT:      Head: Normocephalic and atraumatic.      Nose: Nose normal.      Mouth/Throat:      Pharynx: Oropharynx is clear.   Eyes:      Comments: Right eye blind   Cardiovascular:      Rate and Rhythm: Regular rhythm.      Heart sounds: No murmur heard.     No gallop.   Pulmonary:      Breath sounds: No wheezing or rales.      Comments: Decreased BS left  Abdominal:      Palpations: Abdomen is soft.      Tenderness: There is no abdominal tenderness.   Musculoskeletal:      Cervical back: Neck supple.      Right lower leg: No edema.      Left lower leg: No edema.   Skin:     Findings: No rash.   Neurological:      Mental Status: She is alert and oriented to person, place, and time.   Psychiatric:         Mood and Affect: Mood normal.         Behavior: Behavior normal.         Recent Labs     05/25/25  0210 05/24/25  1020   WBC 12.2 13.7*   HGB 8.6* 11.2*   HCT 28.1* 36.6   MCV 88.9 89.5   * 675*       Recent Labs     05/25/25  0210 05/24/25  1020   BUN 12 13   CREATININE 0.50* 0.61       Results       Procedure Component Value Units Date/Time    Culture with Smear, Acid Fast Bacillius [[id number]]     Order Status: Sent Specimen: Body Fluid     Culture, Fungus [[id number]]     Order Status: Sent Specimen: Body Fluid     Gram stain [[id number]]     Order Status: Sent Specimen: Body Fluid     Culture, Body Fluid (with Gram Stain) [[id number]]     Order Status: Sent Specimen: Body Fluid     Culture, Blood 1 [[id number]]     Order Status: Sent Specimen: Blood     Culture, Blood 2 [[id number]]     Order Status: Sent Specimen: Blood     Strep Pneumoniae Antigen [[id number]]     Order Status: Sent Specimen: Urine, clean catch     
Results   Component Value Date/Time    WBC 11.1 05/28/2025 03:01 AM    HCT 29.2 (L) 05/28/2025 03:01 AM        Coagulation   Lab Results   Component Value Date    INR 1.2 (H) 05/25/2025    APTT 29.1 11/27/2022          Physical Assessment:  /71   Pulse 90   Temp 98.3 °F (36.8 °C) (Oral)   Resp 26   Ht 1.651 m (5' 5\")   Wt 52.2 kg (115 lb)   SpO2 97%   BMI 19.14 kg/m²     Const: Thin frail appearing no distress  Cardio:  RRR, no murmurs, rubs, or gallops  Pulm: Diminished bilaterally left greater than right with coarse breath sounds  Abd:  Soft, NT, ND, +BS  Extr:  No LE edema, bilat  Neuro:  Moves all extremities well  Skin:  Warm and dry        Christoph Chen PA-C  For Ottoniel DAVIS  
fracture.    Impression  Recurrent complex left pleural hydropneumothorax  Associated minimal left basilar subsegmental atelectasis  No pulmonary embolism    Electronically signed by Kymberly Malagon        High complexity decision making was performed during the evaluation of this patient at high risk for decompensation with multiple organ involvement     Above mentioned total time spent on reviewing the case/medical record/data/notes/EMR/patient examination/documentation/coordinating care with nurse/consultants, exclusive of procedures with complex decision making performed and > 50% time spent in face to face evaluation.     Sb Weiss, DO   5/25/2025  Pulmonary, Critical Care Medicine  [facility]

## 2025-05-29 ENCOUNTER — APPOINTMENT (OUTPATIENT)
Facility: HOSPITAL | Age: 75
DRG: 177 | End: 2025-05-29
Attending: INTERNAL MEDICINE
Payer: MEDICARE

## 2025-05-29 PROBLEM — J86.9 EMPYEMA, LEFT (HCC): Status: ACTIVE | Noted: 2025-05-29

## 2025-05-29 LAB
CENTROMERE B AB SER-ACNC: <0.2 AI (ref 0–0.9)
CHROMATIN AB SERPL-ACNC: <0.2 AI (ref 0–0.9)
DSDNA AB SER-ACNC: <1 IU/ML (ref 0–9)
ENA JO1 AB SER-ACNC: <0.2 AI (ref 0–0.9)
ENA RNP AB SER-ACNC: 0.2 AI (ref 0–0.9)
ENA SCL70 AB SER-ACNC: <0.2 AI (ref 0–0.9)
ENA SM AB SER-ACNC: <0.2 AI (ref 0–0.9)
ENA SM+RNP AB SER-ACNC: <0.2 AI (ref 0–0.9)
ENA SS-A AB SER-ACNC: <0.2 AI (ref 0–0.9)
ENA SS-B AB SER-ACNC: <0.2 AI (ref 0–0.9)
GALACTOMANNAN AG SPEC IA-ACNC: 0.07 INDEX (ref 0–0.49)
GLUCOSE BLD STRIP.AUTO-MCNC: 126 MG/DL (ref 70–110)
GLUCOSE BLD STRIP.AUTO-MCNC: 95 MG/DL (ref 70–110)
RIBOSOMAL P AB SER-ACNC: <0.2 AI (ref 0–0.9)
SEE BELOW:, 164879: NORMAL

## 2025-05-29 PROCEDURE — 6370000000 HC RX 637 (ALT 250 FOR IP): Performed by: INTERNAL MEDICINE

## 2025-05-29 PROCEDURE — 99232 SBSQ HOSP IP/OBS MODERATE 35: CPT | Performed by: STUDENT IN AN ORGANIZED HEALTH CARE EDUCATION/TRAINING PROGRAM

## 2025-05-29 PROCEDURE — 32551 INSERTION OF CHEST TUBE: CPT

## 2025-05-29 PROCEDURE — 99232 SBSQ HOSP IP/OBS MODERATE 35: CPT | Performed by: INTERNAL MEDICINE

## 2025-05-29 PROCEDURE — 82962 GLUCOSE BLOOD TEST: CPT

## 2025-05-29 PROCEDURE — 2580000003 HC RX 258: Performed by: INTERNAL MEDICINE

## 2025-05-29 PROCEDURE — 6360000002 HC RX W HCPCS: Performed by: INTERNAL MEDICINE

## 2025-05-29 PROCEDURE — 2140000001 HC CVICU INTERMEDIATE R&B

## 2025-05-29 PROCEDURE — 71045 X-RAY EXAM CHEST 1 VIEW: CPT

## 2025-05-29 PROCEDURE — 2500000003 HC RX 250 WO HCPCS: Performed by: INTERNAL MEDICINE

## 2025-05-29 PROCEDURE — APPSS15 APP SPLIT SHARED TIME 0-15 MINUTES

## 2025-05-29 RX ORDER — LINEZOLID 600 MG/1
600 TABLET, FILM COATED ORAL EVERY 12 HOURS SCHEDULED
Status: DISCONTINUED | OUTPATIENT
Start: 2025-05-29 | End: 2025-06-10 | Stop reason: HOSPADM

## 2025-05-29 RX ORDER — LOPERAMIDE HYDROCHLORIDE 2 MG/1
2 CAPSULE ORAL 4 TIMES DAILY PRN
Status: DISCONTINUED | OUTPATIENT
Start: 2025-05-29 | End: 2025-06-10 | Stop reason: HOSPADM

## 2025-05-29 RX ADMIN — THERA TABS 1 TABLET: TAB at 09:19

## 2025-05-29 RX ADMIN — ACETAMINOPHEN 650 MG: 325 TABLET ORAL at 22:21

## 2025-05-29 RX ADMIN — SODIUM CHLORIDE, PRESERVATIVE FREE 10 ML: 5 INJECTION INTRAVENOUS at 22:23

## 2025-05-29 RX ADMIN — LINEZOLID 600 MG: 600 TABLET, FILM COATED ORAL at 22:21

## 2025-05-29 RX ADMIN — VANCOMYCIN HYDROCHLORIDE 1000 MG: 1 INJECTION, POWDER, LYOPHILIZED, FOR SOLUTION INTRAVENOUS at 10:06

## 2025-05-29 RX ADMIN — PANTOPRAZOLE SODIUM 40 MG: 40 TABLET, DELAYED RELEASE ORAL at 10:13

## 2025-05-29 RX ADMIN — ROSUVASTATIN CALCIUM 10 MG: 10 TABLET, FILM COATED ORAL at 09:19

## 2025-05-29 RX ADMIN — PIPERACILLIN AND TAZOBACTAM 3375 MG: 3; .375 INJECTION, POWDER, LYOPHILIZED, FOR SOLUTION INTRAVENOUS at 05:59

## 2025-05-29 RX ADMIN — Medication 1 CAPSULE: at 09:19

## 2025-05-29 RX ADMIN — LEVOTHYROXINE SODIUM 200 MCG: 200 TABLET ORAL at 05:56

## 2025-05-29 RX ADMIN — SODIUM CHLORIDE, PRESERVATIVE FREE 10 ML: 5 INJECTION INTRAVENOUS at 15:08

## 2025-05-29 RX ADMIN — ENOXAPARIN SODIUM 40 MG: 100 INJECTION SUBCUTANEOUS at 09:20

## 2025-05-29 RX ADMIN — PIPERACILLIN AND TAZOBACTAM 3375 MG: 3; .375 INJECTION, POWDER, LYOPHILIZED, FOR SOLUTION INTRAVENOUS at 15:08

## 2025-05-29 ASSESSMENT — PAIN SCALES - GENERAL
PAINLEVEL_OUTOF10: 4
PAINLEVEL_OUTOF10: 1
PAINLEVEL_OUTOF10: 0

## 2025-05-29 ASSESSMENT — PAIN DESCRIPTION - DESCRIPTORS: DESCRIPTORS: ACHING

## 2025-05-29 ASSESSMENT — PAIN DESCRIPTION - ORIENTATION: ORIENTATION: LEFT

## 2025-05-29 ASSESSMENT — PAIN - FUNCTIONAL ASSESSMENT: PAIN_FUNCTIONAL_ASSESSMENT: ACTIVITIES ARE NOT PREVENTED

## 2025-05-29 ASSESSMENT — PAIN DESCRIPTION - LOCATION: LOCATION: ABDOMEN

## 2025-05-30 LAB
ACID FAST STN SPEC: NEGATIVE
ANION GAP SERPL CALC-SCNC: 11 MMOL/L (ref 3–18)
BACTERIA SPEC CULT: ABNORMAL
BASOPHILS # BLD: 0.06 K/UL (ref 0–0.1)
BASOPHILS NFR BLD: 0.4 % (ref 0–2)
BUN SERPL-MCNC: 9 MG/DL (ref 6–23)
BUN/CREAT SERPL: 15 (ref 12–20)
CALCIUM SERPL-MCNC: 9.3 MG/DL (ref 8.5–10.1)
CHLORIDE SERPL-SCNC: 101 MMOL/L (ref 98–107)
CO2 SERPL-SCNC: 22 MMOL/L (ref 21–32)
CREAT SERPL-MCNC: 0.6 MG/DL (ref 0.6–1.3)
DIFFERENTIAL METHOD BLD: ABNORMAL
EOSINOPHIL # BLD: 0.26 K/UL (ref 0–0.4)
EOSINOPHIL NFR BLD: 1.9 % (ref 0–5)
ERYTHROCYTE [DISTWIDTH] IN BLOOD BY AUTOMATED COUNT: 17.2 % (ref 11.6–14.5)
GLUCOSE SERPL-MCNC: 98 MG/DL (ref 74–108)
GRAM STN SPEC: ABNORMAL
GRAM STN SPEC: ABNORMAL
HCT VFR BLD AUTO: 27.4 % (ref 35–45)
HGB BLD-MCNC: 8.4 G/DL (ref 12–16)
IMM GRANULOCYTES # BLD AUTO: 0.07 K/UL (ref 0–0.04)
IMM GRANULOCYTES NFR BLD AUTO: 0.5 % (ref 0–0.5)
LYMPHOCYTES # BLD: 1.05 K/UL (ref 0.9–3.6)
LYMPHOCYTES NFR BLD: 7.7 % (ref 21–52)
MAGNESIUM SERPL-MCNC: 2 MG/DL (ref 1.6–2.6)
MCH RBC QN AUTO: 27 PG (ref 24–34)
MCHC RBC AUTO-ENTMCNC: 30.7 G/DL (ref 31–37)
MCV RBC AUTO: 88.1 FL (ref 78–100)
MONOCYTES # BLD: 0.77 K/UL (ref 0.05–1.2)
MONOCYTES NFR BLD: 5.7 % (ref 3–10)
MYCOBACTERIUM SPEC QL CULT: NORMAL
NEUTS SEG # BLD: 11.36 K/UL (ref 1.8–8)
NEUTS SEG NFR BLD: 83.8 % (ref 40–73)
NRBC # BLD: 0 K/UL (ref 0–0.01)
NRBC BLD-RTO: 0 PER 100 WBC
PLATELET # BLD AUTO: 691 K/UL (ref 135–420)
PMV BLD AUTO: 8.8 FL (ref 9.2–11.8)
POTASSIUM SERPL-SCNC: 3.7 MMOL/L (ref 3.5–5.5)
RBC # BLD AUTO: 3.11 M/UL (ref 4.2–5.3)
SERVICE CMNT-IMP: ABNORMAL
SODIUM SERPL-SCNC: 134 MMOL/L (ref 136–145)
SPECIMEN PREPARATION: NORMAL
SPECIMEN SOURCE: NORMAL
VANCOMYCIN SERPL-MCNC: 12.7 UG/ML (ref 5–40)
WBC # BLD AUTO: 13.6 K/UL (ref 4.6–13.2)

## 2025-05-30 PROCEDURE — 85025 COMPLETE CBC W/AUTO DIFF WBC: CPT

## 2025-05-30 PROCEDURE — 99232 SBSQ HOSP IP/OBS MODERATE 35: CPT | Performed by: INTERNAL MEDICINE

## 2025-05-30 PROCEDURE — 99232 SBSQ HOSP IP/OBS MODERATE 35: CPT | Performed by: STUDENT IN AN ORGANIZED HEALTH CARE EDUCATION/TRAINING PROGRAM

## 2025-05-30 PROCEDURE — 83735 ASSAY OF MAGNESIUM: CPT

## 2025-05-30 PROCEDURE — 2140000001 HC CVICU INTERMEDIATE R&B

## 2025-05-30 PROCEDURE — 80048 BASIC METABOLIC PNL TOTAL CA: CPT

## 2025-05-30 PROCEDURE — 2580000003 HC RX 258

## 2025-05-30 PROCEDURE — 2700000000 HC OXYGEN THERAPY PER DAY

## 2025-05-30 PROCEDURE — 2500000003 HC RX 250 WO HCPCS

## 2025-05-30 PROCEDURE — 6360000002 HC RX W HCPCS

## 2025-05-30 PROCEDURE — 6360000002 HC RX W HCPCS: Performed by: INTERNAL MEDICINE

## 2025-05-30 PROCEDURE — APPSS15 APP SPLIT SHARED TIME 0-15 MINUTES

## 2025-05-30 PROCEDURE — 6370000000 HC RX 637 (ALT 250 FOR IP): Performed by: INTERNAL MEDICINE

## 2025-05-30 PROCEDURE — 2500000003 HC RX 250 WO HCPCS: Performed by: INTERNAL MEDICINE

## 2025-05-30 PROCEDURE — 80202 ASSAY OF VANCOMYCIN: CPT

## 2025-05-30 PROCEDURE — 32561 LYSE CHEST FIBRIN INIT DAY: CPT

## 2025-05-30 PROCEDURE — 94761 N-INVAS EAR/PLS OXIMETRY MLT: CPT

## 2025-05-30 PROCEDURE — 36415 COLL VENOUS BLD VENIPUNCTURE: CPT

## 2025-05-30 RX ADMIN — LINEZOLID 600 MG: 600 TABLET, FILM COATED ORAL at 21:48

## 2025-05-30 RX ADMIN — Medication 1 CAPSULE: at 10:14

## 2025-05-30 RX ADMIN — WATER 5 MG: 1 INJECTION INTRAMUSCULAR; INTRAVENOUS; SUBCUTANEOUS at 14:31

## 2025-05-30 RX ADMIN — THERA TABS 1 TABLET: TAB at 10:12

## 2025-05-30 RX ADMIN — TRAMADOL HYDROCHLORIDE 50 MG: 50 TABLET, COATED ORAL at 17:03

## 2025-05-30 RX ADMIN — ROSUVASTATIN CALCIUM 10 MG: 10 TABLET, FILM COATED ORAL at 10:12

## 2025-05-30 RX ADMIN — LEVOTHYROXINE SODIUM 200 MCG: 200 TABLET ORAL at 05:54

## 2025-05-30 RX ADMIN — SODIUM CHLORIDE, PRESERVATIVE FREE 10 ML: 5 INJECTION INTRAVENOUS at 21:48

## 2025-05-30 RX ADMIN — PANTOPRAZOLE SODIUM 40 MG: 40 TABLET, DELAYED RELEASE ORAL at 05:54

## 2025-05-30 RX ADMIN — TRAMADOL HYDROCHLORIDE 50 MG: 50 TABLET, COATED ORAL at 23:27

## 2025-05-30 RX ADMIN — LINEZOLID 600 MG: 600 TABLET, FILM COATED ORAL at 10:12

## 2025-05-30 RX ADMIN — SODIUM CHLORIDE, PRESERVATIVE FREE 10 ML: 5 INJECTION INTRAVENOUS at 10:12

## 2025-05-30 RX ADMIN — ENOXAPARIN SODIUM 40 MG: 100 INJECTION SUBCUTANEOUS at 10:12

## 2025-05-30 RX ADMIN — ALTEPLASE 10 MG: 2.2 INJECTION, POWDER, LYOPHILIZED, FOR SOLUTION INTRAVENOUS at 14:31

## 2025-05-30 ASSESSMENT — PAIN DESCRIPTION - DESCRIPTORS
DESCRIPTORS: ACHING

## 2025-05-30 ASSESSMENT — PAIN SCALES - GENERAL
PAINLEVEL_OUTOF10: 0
PAINLEVEL_OUTOF10: 0
PAINLEVEL_OUTOF10: 7
PAINLEVEL_OUTOF10: 4
PAINLEVEL_OUTOF10: 0
PAINLEVEL_OUTOF10: 4
PAINLEVEL_OUTOF10: 7

## 2025-05-30 ASSESSMENT — PAIN DESCRIPTION - LOCATION
LOCATION: CHEST

## 2025-05-30 ASSESSMENT — PAIN - FUNCTIONAL ASSESSMENT
PAIN_FUNCTIONAL_ASSESSMENT: ACTIVITIES ARE NOT PREVENTED
PAIN_FUNCTIONAL_ASSESSMENT: ACTIVITIES ARE NOT PREVENTED

## 2025-05-30 ASSESSMENT — PAIN DESCRIPTION - FREQUENCY
FREQUENCY: INTERMITTENT
FREQUENCY: INTERMITTENT

## 2025-05-30 ASSESSMENT — PAIN DESCRIPTION - ONSET
ONSET: GRADUAL
ONSET: GRADUAL

## 2025-05-30 ASSESSMENT — PAIN DESCRIPTION - PAIN TYPE
TYPE: ACUTE PAIN;SURGICAL PAIN
TYPE: ACUTE PAIN

## 2025-05-30 ASSESSMENT — PAIN DESCRIPTION - ORIENTATION
ORIENTATION: LEFT

## 2025-05-30 NOTE — PRE-PROCEDURE INSTRUCTIONS
[facility]  Pulmonary, Critical Care, and Sleep Medicine  TPA/Dornase instillation via Chest Tube Procedure     Indication/ pre procedure diagnosis: empyema   Post procedure diagnosis: same     Chest tube clamped. Aseptic technique was observed. Dornase nicholas 5 mg/30ml followed by Alteplase 10 mg/30ml instilled into LEFT pleural cavity via self sealing chest tube.   Instructions given to nursing to reposition pt q 15 minutes x4, and unclamp tube and place back on wall suction at 20mmhg after 1 hour at 15:32hrs  Pt tolerated procedure well.     Masood Mcelroy PA-C  05/30/25  Pulmonary, Critical Care Medicine  [facility]

## 2025-05-31 LAB
ANION GAP SERPL CALC-SCNC: 11 MMOL/L (ref 3–18)
BACTERIA SPEC CULT: NORMAL
BACTERIA SPEC CULT: NORMAL
BASOPHILS # BLD: 0.06 K/UL (ref 0–0.1)
BASOPHILS NFR BLD: 0.6 % (ref 0–2)
BUN SERPL-MCNC: 9 MG/DL (ref 6–23)
BUN/CREAT SERPL: 17 (ref 12–20)
CALCIUM SERPL-MCNC: 9.2 MG/DL (ref 8.5–10.1)
CHLORIDE SERPL-SCNC: 100 MMOL/L (ref 98–107)
CO2 SERPL-SCNC: 24 MMOL/L (ref 21–32)
CREAT SERPL-MCNC: 0.54 MG/DL (ref 0.6–1.3)
DIFFERENTIAL METHOD BLD: ABNORMAL
EOSINOPHIL # BLD: 0.4 K/UL (ref 0–0.4)
EOSINOPHIL NFR BLD: 3.8 % (ref 0–5)
ERYTHROCYTE [DISTWIDTH] IN BLOOD BY AUTOMATED COUNT: 17.4 % (ref 11.6–14.5)
GLUCOSE SERPL-MCNC: 95 MG/DL (ref 74–108)
HCT VFR BLD AUTO: 28 % (ref 35–45)
HGB BLD-MCNC: 8.4 G/DL (ref 12–16)
IMM GRANULOCYTES # BLD AUTO: 0.07 K/UL (ref 0–0.04)
IMM GRANULOCYTES NFR BLD AUTO: 0.7 % (ref 0–0.5)
LYMPHOCYTES # BLD: 1.04 K/UL (ref 0.9–3.6)
LYMPHOCYTES NFR BLD: 9.8 % (ref 21–52)
MCH RBC QN AUTO: 26.7 PG (ref 24–34)
MCHC RBC AUTO-ENTMCNC: 30 G/DL (ref 31–37)
MCV RBC AUTO: 88.9 FL (ref 78–100)
MONOCYTES # BLD: 0.79 K/UL (ref 0.05–1.2)
MONOCYTES NFR BLD: 7.4 % (ref 3–10)
NEUTS SEG # BLD: 8.28 K/UL (ref 1.8–8)
NEUTS SEG NFR BLD: 77.7 % (ref 40–73)
NRBC # BLD: 0 K/UL (ref 0–0.01)
NRBC BLD-RTO: 0 PER 100 WBC
PLATELET # BLD AUTO: 742 K/UL (ref 135–420)
PMV BLD AUTO: 8.6 FL (ref 9.2–11.8)
POTASSIUM SERPL-SCNC: 3.3 MMOL/L (ref 3.5–5.5)
RBC # BLD AUTO: 3.15 M/UL (ref 4.2–5.3)
SERVICE CMNT-IMP: NORMAL
SERVICE CMNT-IMP: NORMAL
SODIUM SERPL-SCNC: 135 MMOL/L (ref 136–145)
WBC # BLD AUTO: 10.6 K/UL (ref 4.6–13.2)

## 2025-05-31 PROCEDURE — 32562 LYSE CHEST FIBRIN SUBQ DAY: CPT | Performed by: NURSE PRACTITIONER

## 2025-05-31 PROCEDURE — 6370000000 HC RX 637 (ALT 250 FOR IP): Performed by: INTERNAL MEDICINE

## 2025-05-31 PROCEDURE — 2140000001 HC CVICU INTERMEDIATE R&B

## 2025-05-31 PROCEDURE — 32562 LYSE CHEST FIBRIN SUBQ DAY: CPT

## 2025-05-31 PROCEDURE — 80048 BASIC METABOLIC PNL TOTAL CA: CPT

## 2025-05-31 PROCEDURE — 6370000000 HC RX 637 (ALT 250 FOR IP): Performed by: STUDENT IN AN ORGANIZED HEALTH CARE EDUCATION/TRAINING PROGRAM

## 2025-05-31 PROCEDURE — 2500000003 HC RX 250 WO HCPCS: Performed by: INTERNAL MEDICINE

## 2025-05-31 PROCEDURE — 99232 SBSQ HOSP IP/OBS MODERATE 35: CPT | Performed by: STUDENT IN AN ORGANIZED HEALTH CARE EDUCATION/TRAINING PROGRAM

## 2025-05-31 PROCEDURE — 85025 COMPLETE CBC W/AUTO DIFF WBC: CPT

## 2025-05-31 PROCEDURE — 3E0L317 INTRODUCTION OF OTHER THROMBOLYTIC INTO PLEURAL CAVITY, PERCUTANEOUS APPROACH: ICD-10-PCS | Performed by: NURSE PRACTITIONER

## 2025-05-31 PROCEDURE — 2500000003 HC RX 250 WO HCPCS: Performed by: STUDENT IN AN ORGANIZED HEALTH CARE EDUCATION/TRAINING PROGRAM

## 2025-05-31 PROCEDURE — 2500000003 HC RX 250 WO HCPCS

## 2025-05-31 PROCEDURE — 2580000003 HC RX 258

## 2025-05-31 PROCEDURE — 6360000002 HC RX W HCPCS

## 2025-05-31 PROCEDURE — 2580000003 HC RX 258: Performed by: STUDENT IN AN ORGANIZED HEALTH CARE EDUCATION/TRAINING PROGRAM

## 2025-05-31 PROCEDURE — 3E0L3GC INTRODUCTION OF OTHER THERAPEUTIC SUBSTANCE INTO PLEURAL CAVITY, PERCUTANEOUS APPROACH: ICD-10-PCS | Performed by: NURSE PRACTITIONER

## 2025-05-31 PROCEDURE — 36415 COLL VENOUS BLD VENIPUNCTURE: CPT

## 2025-05-31 PROCEDURE — 6360000002 HC RX W HCPCS: Performed by: INTERNAL MEDICINE

## 2025-05-31 PROCEDURE — 6360000002 HC RX W HCPCS: Performed by: STUDENT IN AN ORGANIZED HEALTH CARE EDUCATION/TRAINING PROGRAM

## 2025-05-31 RX ORDER — LOPERAMIDE HYDROCHLORIDE 2 MG/1
2 CAPSULE ORAL EVERY 4 HOURS
Status: COMPLETED | OUTPATIENT
Start: 2025-05-31 | End: 2025-06-01

## 2025-05-31 RX ORDER — KETOROLAC TROMETHAMINE 15 MG/ML
15 INJECTION, SOLUTION INTRAMUSCULAR; INTRAVENOUS EVERY 6 HOURS PRN
Status: DISPENSED | OUTPATIENT
Start: 2025-05-31 | End: 2025-06-05

## 2025-05-31 RX ADMIN — LINEZOLID 600 MG: 600 TABLET, FILM COATED ORAL at 10:17

## 2025-05-31 RX ADMIN — KETOROLAC TROMETHAMINE 15 MG: 15 INJECTION, SOLUTION INTRAMUSCULAR; INTRAVENOUS at 21:29

## 2025-05-31 RX ADMIN — WATER 5 MG: 1 INJECTION INTRAMUSCULAR; INTRAVENOUS; SUBCUTANEOUS at 02:25

## 2025-05-31 RX ADMIN — WATER 5 MG: 1 INJECTION INTRAMUSCULAR; INTRAVENOUS; SUBCUTANEOUS at 15:42

## 2025-05-31 RX ADMIN — LOPERAMIDE HYDROCHLORIDE 2 MG: 2 CAPSULE ORAL at 21:24

## 2025-05-31 RX ADMIN — ENOXAPARIN SODIUM 40 MG: 100 INJECTION SUBCUTANEOUS at 10:17

## 2025-05-31 RX ADMIN — PANTOPRAZOLE SODIUM 40 MG: 40 TABLET, DELAYED RELEASE ORAL at 06:23

## 2025-05-31 RX ADMIN — SODIUM CHLORIDE, PRESERVATIVE FREE 10 ML: 5 INJECTION INTRAVENOUS at 10:18

## 2025-05-31 RX ADMIN — LEVOTHYROXINE SODIUM 200 MCG: 200 TABLET ORAL at 06:23

## 2025-05-31 RX ADMIN — SODIUM CHLORIDE, PRESERVATIVE FREE 10 ML: 5 INJECTION INTRAVENOUS at 21:30

## 2025-05-31 RX ADMIN — POTASSIUM CHLORIDE 40 MEQ: 1500 TABLET, EXTENDED RELEASE ORAL at 06:23

## 2025-05-31 RX ADMIN — LOPERAMIDE HYDROCHLORIDE 2 MG: 2 CAPSULE ORAL at 18:33

## 2025-05-31 RX ADMIN — ROSUVASTATIN CALCIUM 10 MG: 10 TABLET, FILM COATED ORAL at 10:17

## 2025-05-31 RX ADMIN — ALTEPLASE 10 MG: 2.2 INJECTION, POWDER, LYOPHILIZED, FOR SOLUTION INTRAVENOUS at 02:25

## 2025-05-31 RX ADMIN — THERA TABS 1 TABLET: TAB at 10:17

## 2025-05-31 RX ADMIN — Medication 1 CAPSULE: at 10:34

## 2025-05-31 RX ADMIN — ALTEPLASE 10 MG: 2.2 INJECTION, POWDER, LYOPHILIZED, FOR SOLUTION INTRAVENOUS at 15:42

## 2025-05-31 RX ADMIN — LINEZOLID 600 MG: 600 TABLET, FILM COATED ORAL at 21:24

## 2025-05-31 ASSESSMENT — PAIN SCALES - GENERAL
PAINLEVEL_OUTOF10: 0
PAINLEVEL_OUTOF10: 7
PAINLEVEL_OUTOF10: 4

## 2025-05-31 ASSESSMENT — PAIN DESCRIPTION - PAIN TYPE: TYPE: ACUTE PAIN

## 2025-05-31 ASSESSMENT — PAIN - FUNCTIONAL ASSESSMENT: PAIN_FUNCTIONAL_ASSESSMENT: ACTIVITIES ARE NOT PREVENTED

## 2025-05-31 ASSESSMENT — PAIN DESCRIPTION - ORIENTATION: ORIENTATION: LEFT

## 2025-05-31 ASSESSMENT — PAIN DESCRIPTION - LOCATION: LOCATION: BACK

## 2025-05-31 ASSESSMENT — PAIN SCALES - WONG BAKER: WONGBAKER_NUMERICALRESPONSE: NO HURT

## 2025-05-31 ASSESSMENT — PAIN DESCRIPTION - ONSET: ONSET: GRADUAL

## 2025-05-31 ASSESSMENT — PAIN DESCRIPTION - FREQUENCY: FREQUENCY: INTERMITTENT

## 2025-05-31 ASSESSMENT — PAIN DESCRIPTION - DIRECTION: RADIATING_TOWARDS: CHEST TUBE SITE

## 2025-05-31 ASSESSMENT — PAIN DESCRIPTION - DESCRIPTORS: DESCRIPTORS: ACHING

## 2025-05-31 NOTE — PROCEDURES
PROCEDURE NOTE  Date: 5/31/2025   Name: Cori Dennis  YOB: 1950    Procedures        [facility]  Pulmonary, Critical Care, and Sleep Medicine  TPA/Dornase instillation via Chest Tube Procedure     Indication/ pre procedure diagnosis: empyema   Post procedure diagnosis: same     Chest tube clamped. Aseptic technique was observed. Dornase nicholas 5 mg/30ml followed by Alteplase 10 mg/30ml instilled into LEFT pleural cavity via self sealing chest tube.   Instructions given to nursing to reposition pt q 15 minutes x4, and unclamp tube and place back on wall suction at 20mmhg after 1 hour at 325  Pt tolerated procedure well.     Pia Sierra, AKIL - NP  05/31/25  Pulmonary, Critical Care Medicine  [facility]

## 2025-05-31 NOTE — PROCEDURES
[facility]  Pulmonary, Critical Care, and Sleep Medicine  TPA/Dornase instillation via Chest Tube Procedure     Indication/ pre procedure diagnosis: empyema   Post procedure diagnosis: same     Chest tube clamped. Aseptic technique was observed. Dornase nicholas 5 mg/30ml followed by Alteplase 10 mg/30ml instilled into LEFT pleural cavity via self sealing chest tube.   Instructions given to nursing to reposition pt q 15 minutes x4, and unclamp tube and place back on wall suction at 20mmhg after 1 hour at 1645.  Pt tolerated procedure well.     PATRICIA MCKEON PA-C  05/31/25  Pulmonary, Critical Care Medicine  [facility]

## 2025-06-01 LAB
ANION GAP SERPL CALC-SCNC: 9 MMOL/L (ref 3–18)
BASOPHILS # BLD: 0.06 K/UL (ref 0–0.1)
BASOPHILS NFR BLD: 0.7 % (ref 0–2)
BUN SERPL-MCNC: 7 MG/DL (ref 6–23)
BUN/CREAT SERPL: 11 (ref 12–20)
CALCIUM SERPL-MCNC: 9.1 MG/DL (ref 8.5–10.1)
CHLORIDE SERPL-SCNC: 102 MMOL/L (ref 98–107)
CO2 SERPL-SCNC: 25 MMOL/L (ref 21–32)
CREAT SERPL-MCNC: 0.64 MG/DL (ref 0.6–1.3)
DIFFERENTIAL METHOD BLD: ABNORMAL
EOSINOPHIL # BLD: 0.52 K/UL (ref 0–0.4)
EOSINOPHIL NFR BLD: 5.9 % (ref 0–5)
ERYTHROCYTE [DISTWIDTH] IN BLOOD BY AUTOMATED COUNT: 17.4 % (ref 11.6–14.5)
GLUCOSE SERPL-MCNC: 78 MG/DL (ref 74–108)
HCT VFR BLD AUTO: 27.6 % (ref 35–45)
HGB BLD-MCNC: 8.3 G/DL (ref 12–16)
IMM GRANULOCYTES # BLD AUTO: 0.05 K/UL (ref 0–0.04)
IMM GRANULOCYTES NFR BLD AUTO: 0.6 % (ref 0–0.5)
LYMPHOCYTES # BLD: 1.06 K/UL (ref 0.9–3.6)
LYMPHOCYTES NFR BLD: 12 % (ref 21–52)
MCH RBC QN AUTO: 26.9 PG (ref 24–34)
MCHC RBC AUTO-ENTMCNC: 30.1 G/DL (ref 31–37)
MCV RBC AUTO: 89.6 FL (ref 78–100)
MONOCYTES # BLD: 0.73 K/UL (ref 0.05–1.2)
MONOCYTES NFR BLD: 8.2 % (ref 3–10)
NEUTS SEG # BLD: 6.44 K/UL (ref 1.8–8)
NEUTS SEG NFR BLD: 72.6 % (ref 40–73)
NRBC # BLD: 0 K/UL (ref 0–0.01)
NRBC BLD-RTO: 0 PER 100 WBC
PLATELET # BLD AUTO: 731 K/UL (ref 135–420)
PMV BLD AUTO: 8.6 FL (ref 9.2–11.8)
POTASSIUM SERPL-SCNC: 4.2 MMOL/L (ref 3.5–5.5)
RBC # BLD AUTO: 3.08 M/UL (ref 4.2–5.3)
SODIUM SERPL-SCNC: 136 MMOL/L (ref 136–145)
WBC # BLD AUTO: 8.9 K/UL (ref 4.6–13.2)

## 2025-06-01 PROCEDURE — 94761 N-INVAS EAR/PLS OXIMETRY MLT: CPT

## 2025-06-01 PROCEDURE — 32551 INSERTION OF CHEST TUBE: CPT

## 2025-06-01 PROCEDURE — 3E03317 INTRODUCTION OF OTHER THROMBOLYTIC INTO PERIPHERAL VEIN, PERCUTANEOUS APPROACH: ICD-10-PCS

## 2025-06-01 PROCEDURE — 80048 BASIC METABOLIC PNL TOTAL CA: CPT

## 2025-06-01 PROCEDURE — 2500000003 HC RX 250 WO HCPCS: Performed by: INTERNAL MEDICINE

## 2025-06-01 PROCEDURE — 2140000001 HC CVICU INTERMEDIATE R&B

## 2025-06-01 PROCEDURE — 3E0L3GC INTRODUCTION OF OTHER THERAPEUTIC SUBSTANCE INTO PLEURAL CAVITY, PERCUTANEOUS APPROACH: ICD-10-PCS

## 2025-06-01 PROCEDURE — 6370000000 HC RX 637 (ALT 250 FOR IP): Performed by: INTERNAL MEDICINE

## 2025-06-01 PROCEDURE — 99232 SBSQ HOSP IP/OBS MODERATE 35: CPT | Performed by: INTERNAL MEDICINE

## 2025-06-01 PROCEDURE — 2580000003 HC RX 258: Performed by: STUDENT IN AN ORGANIZED HEALTH CARE EDUCATION/TRAINING PROGRAM

## 2025-06-01 PROCEDURE — 6360000002 HC RX W HCPCS: Performed by: INTERNAL MEDICINE

## 2025-06-01 PROCEDURE — 85025 COMPLETE CBC W/AUTO DIFF WBC: CPT

## 2025-06-01 PROCEDURE — 2500000003 HC RX 250 WO HCPCS: Performed by: STUDENT IN AN ORGANIZED HEALTH CARE EDUCATION/TRAINING PROGRAM

## 2025-06-01 PROCEDURE — 36415 COLL VENOUS BLD VENIPUNCTURE: CPT

## 2025-06-01 PROCEDURE — 32562 LYSE CHEST FIBRIN SUBQ DAY: CPT

## 2025-06-01 PROCEDURE — 6360000002 HC RX W HCPCS: Performed by: STUDENT IN AN ORGANIZED HEALTH CARE EDUCATION/TRAINING PROGRAM

## 2025-06-01 PROCEDURE — 6370000000 HC RX 637 (ALT 250 FOR IP): Performed by: STUDENT IN AN ORGANIZED HEALTH CARE EDUCATION/TRAINING PROGRAM

## 2025-06-01 RX ADMIN — ROSUVASTATIN CALCIUM 10 MG: 10 TABLET, FILM COATED ORAL at 10:16

## 2025-06-01 RX ADMIN — THERA TABS 1 TABLET: TAB at 10:16

## 2025-06-01 RX ADMIN — SODIUM CHLORIDE, PRESERVATIVE FREE 10 ML: 5 INJECTION INTRAVENOUS at 10:20

## 2025-06-01 RX ADMIN — LOPERAMIDE HYDROCHLORIDE 2 MG: 2 CAPSULE ORAL at 06:05

## 2025-06-01 RX ADMIN — LOPERAMIDE HYDROCHLORIDE 2 MG: 2 CAPSULE ORAL at 02:08

## 2025-06-01 RX ADMIN — LOPERAMIDE HYDROCHLORIDE 2 MG: 2 CAPSULE ORAL at 10:15

## 2025-06-01 RX ADMIN — ENOXAPARIN SODIUM 40 MG: 100 INJECTION SUBCUTANEOUS at 10:17

## 2025-06-01 RX ADMIN — SODIUM CHLORIDE, PRESERVATIVE FREE 10 ML: 5 INJECTION INTRAVENOUS at 21:11

## 2025-06-01 RX ADMIN — WATER 5 MG: 1 INJECTION INTRAMUSCULAR; INTRAVENOUS; SUBCUTANEOUS at 19:45

## 2025-06-01 RX ADMIN — ALTEPLASE 10 MG: 2.2 INJECTION, POWDER, LYOPHILIZED, FOR SOLUTION INTRAVENOUS at 19:45

## 2025-06-01 RX ADMIN — LINEZOLID 600 MG: 600 TABLET, FILM COATED ORAL at 21:11

## 2025-06-01 RX ADMIN — LINEZOLID 600 MG: 600 TABLET, FILM COATED ORAL at 10:15

## 2025-06-01 RX ADMIN — ALTEPLASE 10 MG: 2.2 INJECTION, POWDER, LYOPHILIZED, FOR SOLUTION INTRAVENOUS at 07:45

## 2025-06-01 RX ADMIN — LEVOTHYROXINE SODIUM 200 MCG: 200 TABLET ORAL at 06:05

## 2025-06-01 RX ADMIN — PANTOPRAZOLE SODIUM 40 MG: 40 TABLET, DELAYED RELEASE ORAL at 06:05

## 2025-06-01 RX ADMIN — WATER 5 MG: 1 INJECTION INTRAMUSCULAR; INTRAVENOUS; SUBCUTANEOUS at 07:45

## 2025-06-01 ASSESSMENT — PAIN SCALES - GENERAL
PAINLEVEL_OUTOF10: 0

## 2025-06-01 ASSESSMENT — PAIN SCALES - WONG BAKER
WONGBAKER_NUMERICALRESPONSE: NO HURT
WONGBAKER_NUMERICALRESPONSE: NO HURT

## 2025-06-01 NOTE — PROCEDURES
PROCEDURE NOTE  Date: 6/1/2025   Name: Cori Dennis  YOB: 1950    Procedures    [facility]  Pulmonary, Critical Care, and Sleep Medicine  TPA/Dornase instillation via Chest Tube Procedure     Indication/ pre procedure diagnosis: empyema   Post procedure diagnosis: same     Chest tube clamped. Aseptic technique was observed. Dornase nicholas 5 mg/30ml followed by Alteplase 10 mg/30ml instilled into LEFT pleural cavity via self sealing chest tube.   Instructions given to nursing to reposition pt q 15 minutes x4, and unclamp tube and place back on wall suction at 20mmhg after 1 hour at 08:45.   Pt tolerated procedure well.       Luz Maria Purdy PA-C  06/01/25  Pulmonary, Critical Care Medicine  [facility]

## 2025-06-02 ENCOUNTER — APPOINTMENT (OUTPATIENT)
Facility: HOSPITAL | Age: 75
DRG: 177 | End: 2025-06-02
Attending: INTERNAL MEDICINE
Payer: MEDICARE

## 2025-06-02 PROBLEM — E43 SEVERE PROTEIN-CALORIE MALNUTRITION: Status: ACTIVE | Noted: 2025-06-02

## 2025-06-02 LAB
GLUCOSE FLD-MCNC: 3 MG/DL
LDH FLD L TO P-CCNC: >4000 U/L
PROT FLD-MCNC: 4.9 G/DL
SPECIMEN SOURCE FLD: NORMAL

## 2025-06-02 PROCEDURE — 94761 N-INVAS EAR/PLS OXIMETRY MLT: CPT

## 2025-06-02 PROCEDURE — 32561 LYSE CHEST FIBRIN INIT DAY: CPT | Performed by: INTERNAL MEDICINE

## 2025-06-02 PROCEDURE — 6360000002 HC RX W HCPCS: Performed by: STUDENT IN AN ORGANIZED HEALTH CARE EDUCATION/TRAINING PROGRAM

## 2025-06-02 PROCEDURE — 2500000003 HC RX 250 WO HCPCS: Performed by: INTERNAL MEDICINE

## 2025-06-02 PROCEDURE — 2140000001 HC CVICU INTERMEDIATE R&B

## 2025-06-02 PROCEDURE — 6360000002 HC RX W HCPCS: Performed by: INTERNAL MEDICINE

## 2025-06-02 PROCEDURE — 6370000000 HC RX 637 (ALT 250 FOR IP): Performed by: INTERNAL MEDICINE

## 2025-06-02 PROCEDURE — 3E03317 INTRODUCTION OF OTHER THROMBOLYTIC INTO PERIPHERAL VEIN, PERCUTANEOUS APPROACH: ICD-10-PCS | Performed by: INTERNAL MEDICINE

## 2025-06-02 PROCEDURE — 2580000003 HC RX 258: Performed by: INTERNAL MEDICINE

## 2025-06-02 PROCEDURE — 97530 THERAPEUTIC ACTIVITIES: CPT

## 2025-06-02 PROCEDURE — 99231 SBSQ HOSP IP/OBS SF/LOW 25: CPT | Performed by: INTERNAL MEDICINE

## 2025-06-02 PROCEDURE — 97535 SELF CARE MNGMENT TRAINING: CPT

## 2025-06-02 PROCEDURE — 71045 X-RAY EXAM CHEST 1 VIEW: CPT

## 2025-06-02 PROCEDURE — 32551 INSERTION OF CHEST TUBE: CPT

## 2025-06-02 PROCEDURE — 99232 SBSQ HOSP IP/OBS MODERATE 35: CPT | Performed by: INTERNAL MEDICINE

## 2025-06-02 RX ADMIN — SODIUM CHLORIDE, PRESERVATIVE FREE 10 ML: 5 INJECTION INTRAVENOUS at 10:28

## 2025-06-02 RX ADMIN — PANTOPRAZOLE SODIUM 40 MG: 40 TABLET, DELAYED RELEASE ORAL at 06:28

## 2025-06-02 RX ADMIN — LEVOTHYROXINE SODIUM 200 MCG: 200 TABLET ORAL at 06:28

## 2025-06-02 RX ADMIN — Medication 1 CAPSULE: at 10:35

## 2025-06-02 RX ADMIN — WATER 5 MG: 1 INJECTION INTRAMUSCULAR; INTRAVENOUS; SUBCUTANEOUS at 16:55

## 2025-06-02 RX ADMIN — THERA TABS 1 TABLET: TAB at 10:26

## 2025-06-02 RX ADMIN — LINEZOLID 600 MG: 600 TABLET, FILM COATED ORAL at 20:41

## 2025-06-02 RX ADMIN — ENOXAPARIN SODIUM 40 MG: 100 INJECTION SUBCUTANEOUS at 10:28

## 2025-06-02 RX ADMIN — ALTEPLASE 10 MG: 2.2 INJECTION, POWDER, LYOPHILIZED, FOR SOLUTION INTRAVENOUS at 16:54

## 2025-06-02 RX ADMIN — KETOROLAC TROMETHAMINE 15 MG: 15 INJECTION, SOLUTION INTRAMUSCULAR; INTRAVENOUS at 20:39

## 2025-06-02 RX ADMIN — ROSUVASTATIN CALCIUM 10 MG: 10 TABLET, FILM COATED ORAL at 10:26

## 2025-06-02 RX ADMIN — SODIUM CHLORIDE, PRESERVATIVE FREE 10 ML: 5 INJECTION INTRAVENOUS at 20:43

## 2025-06-02 RX ADMIN — LINEZOLID 600 MG: 600 TABLET, FILM COATED ORAL at 10:26

## 2025-06-02 ASSESSMENT — PAIN DESCRIPTION - ORIENTATION: ORIENTATION: LEFT

## 2025-06-02 ASSESSMENT — PAIN DESCRIPTION - DESCRIPTORS: DESCRIPTORS: ACHING;SHOOTING

## 2025-06-02 ASSESSMENT — PAIN SCALES - GENERAL
PAINLEVEL_OUTOF10: 0
PAINLEVEL_OUTOF10: 8
PAINLEVEL_OUTOF10: 0
PAINLEVEL_OUTOF10: 4

## 2025-06-02 ASSESSMENT — PAIN DESCRIPTION - PAIN TYPE: TYPE: SURGICAL PAIN;ACUTE PAIN

## 2025-06-02 ASSESSMENT — PAIN DESCRIPTION - ONSET: ONSET: GRADUAL

## 2025-06-02 ASSESSMENT — PAIN DESCRIPTION - LOCATION: LOCATION: ABDOMEN

## 2025-06-02 ASSESSMENT — PAIN - FUNCTIONAL ASSESSMENT: PAIN_FUNCTIONAL_ASSESSMENT: ACTIVITIES ARE NOT PREVENTED

## 2025-06-02 ASSESSMENT — PAIN DESCRIPTION - DIRECTION: RADIATING_TOWARDS: CHEST TUBE SITE

## 2025-06-02 NOTE — PROCEDURES
PROCEDURE NOTE  Date: 6/1/2025   Name: Cori Dennis  YOB: 1950    Procedures    [facility]  Pulmonary, Critical Care, and Sleep Medicine  TPA/Dornase instillation via Chest Tube Procedure     Indication/ pre procedure diagnosis: empyema   Post procedure diagnosis: same     Chest tube clamped. Aseptic technique was observed. Dornase nicholas 5 mg/30ml followed by Alteplase 10 mg/30ml instilled into LEFT pleural cavity via self sealing chest tube.   Instructions given to nursing to reposition pt q 15 minutes x4, and unclamp tube and place back on wall suction at 20mmhg after 1 hour at 20:45.   Pt tolerated procedure well.       Luz Maria Purdy PA-C  06/01/25  Pulmonary, Critical Care Medicine  [facility]

## 2025-06-02 NOTE — PROCEDURES
[facility]  Pulmonary, Critical Care, and Sleep Medicine  TPA/Dornase instillation via Chest Tube Procedure     Indication/ pre procedure diagnosis: empyema   Post procedure diagnosis: same     Chest tube clamped. Aseptic technique was observed. Dornase nicholas 5 mg/30ml followed by Alteplase 10 mg/30ml instilled into Left pleural cavity via self sealing chest tube.   Instructions given to nursing to reposition pt q 15 minutes x4, and unclamp tube and place back on wall suction at 20mmhg after 1 hour at 645pm  Pt tolerated procedure well.     Zoltan Chin MD  06/02/25  Pulmonary, Critical Care Medicine  [facility]

## 2025-06-03 ENCOUNTER — APPOINTMENT (OUTPATIENT)
Facility: HOSPITAL | Age: 75
DRG: 177 | End: 2025-06-03
Attending: INTERNAL MEDICINE
Payer: MEDICARE

## 2025-06-03 PROCEDURE — 6360000002 HC RX W HCPCS: Performed by: STUDENT IN AN ORGANIZED HEALTH CARE EDUCATION/TRAINING PROGRAM

## 2025-06-03 PROCEDURE — APPSS15 APP SPLIT SHARED TIME 0-15 MINUTES

## 2025-06-03 PROCEDURE — 97535 SELF CARE MNGMENT TRAINING: CPT

## 2025-06-03 PROCEDURE — 6360000002 HC RX W HCPCS: Performed by: INTERNAL MEDICINE

## 2025-06-03 PROCEDURE — 2580000003 HC RX 258: Performed by: INTERNAL MEDICINE

## 2025-06-03 PROCEDURE — 2500000003 HC RX 250 WO HCPCS: Performed by: INTERNAL MEDICINE

## 2025-06-03 PROCEDURE — 6370000000 HC RX 637 (ALT 250 FOR IP): Performed by: INTERNAL MEDICINE

## 2025-06-03 PROCEDURE — 94761 N-INVAS EAR/PLS OXIMETRY MLT: CPT

## 2025-06-03 PROCEDURE — 71045 X-RAY EXAM CHEST 1 VIEW: CPT

## 2025-06-03 PROCEDURE — 99232 SBSQ HOSP IP/OBS MODERATE 35: CPT | Performed by: INTERNAL MEDICINE

## 2025-06-03 PROCEDURE — 32562 LYSE CHEST FIBRIN SUBQ DAY: CPT | Performed by: PHYSICIAN ASSISTANT

## 2025-06-03 PROCEDURE — 2140000001 HC CVICU INTERMEDIATE R&B

## 2025-06-03 PROCEDURE — 32551 INSERTION OF CHEST TUBE: CPT

## 2025-06-03 RX ADMIN — PANTOPRAZOLE SODIUM 40 MG: 40 TABLET, DELAYED RELEASE ORAL at 06:20

## 2025-06-03 RX ADMIN — Medication 1 CAPSULE: at 08:33

## 2025-06-03 RX ADMIN — ROSUVASTATIN CALCIUM 10 MG: 10 TABLET, FILM COATED ORAL at 08:33

## 2025-06-03 RX ADMIN — LEVOTHYROXINE SODIUM 200 MCG: 200 TABLET ORAL at 06:20

## 2025-06-03 RX ADMIN — THERA TABS 1 TABLET: TAB at 08:33

## 2025-06-03 RX ADMIN — KETOROLAC TROMETHAMINE 15 MG: 15 INJECTION, SOLUTION INTRAMUSCULAR; INTRAVENOUS at 21:25

## 2025-06-03 RX ADMIN — SODIUM CHLORIDE, PRESERVATIVE FREE 10 ML: 5 INJECTION INTRAVENOUS at 21:00

## 2025-06-03 RX ADMIN — LINEZOLID 600 MG: 600 TABLET, FILM COATED ORAL at 08:33

## 2025-06-03 RX ADMIN — LINEZOLID 600 MG: 600 TABLET, FILM COATED ORAL at 21:25

## 2025-06-03 RX ADMIN — WATER 5 MG: 1 INJECTION INTRAMUSCULAR; INTRAVENOUS; SUBCUTANEOUS at 18:16

## 2025-06-03 RX ADMIN — SODIUM CHLORIDE, PRESERVATIVE FREE 10 ML: 5 INJECTION INTRAVENOUS at 08:33

## 2025-06-03 RX ADMIN — ALTEPLASE 10 MG: 2.2 INJECTION, POWDER, LYOPHILIZED, FOR SOLUTION INTRAVENOUS at 05:42

## 2025-06-03 RX ADMIN — ALTEPLASE 10 MG: 2.2 INJECTION, POWDER, LYOPHILIZED, FOR SOLUTION INTRAVENOUS at 18:16

## 2025-06-03 RX ADMIN — ENOXAPARIN SODIUM 40 MG: 100 INJECTION SUBCUTANEOUS at 08:33

## 2025-06-03 RX ADMIN — WATER 5 MG: 1 INJECTION INTRAMUSCULAR; INTRAVENOUS; SUBCUTANEOUS at 05:42

## 2025-06-03 ASSESSMENT — PAIN - FUNCTIONAL ASSESSMENT: PAIN_FUNCTIONAL_ASSESSMENT: ACTIVITIES ARE NOT PREVENTED

## 2025-06-03 ASSESSMENT — PAIN SCALES - GENERAL
PAINLEVEL_OUTOF10: 0
PAINLEVEL_OUTOF10: 3
PAINLEVEL_OUTOF10: 8
PAINLEVEL_OUTOF10: 3
PAINLEVEL_OUTOF10: 5
PAINLEVEL_OUTOF10: 3
PAINLEVEL_OUTOF10: 0
PAINLEVEL_OUTOF10: 2

## 2025-06-03 ASSESSMENT — PAIN DESCRIPTION - PAIN TYPE: TYPE: ACUTE PAIN;SURGICAL PAIN

## 2025-06-03 ASSESSMENT — PAIN DESCRIPTION - ORIENTATION: ORIENTATION: POSTERIOR;LEFT;UPPER

## 2025-06-03 ASSESSMENT — PAIN DESCRIPTION - FREQUENCY: FREQUENCY: INTERMITTENT

## 2025-06-03 ASSESSMENT — PAIN DESCRIPTION - DESCRIPTORS: DESCRIPTORS: BURNING;SHOOTING

## 2025-06-03 ASSESSMENT — PAIN DESCRIPTION - ONSET: ONSET: GRADUAL

## 2025-06-03 NOTE — PROCEDURES
[facility]  Pulmonary, Critical Care, and Sleep Medicine  TPA/Dornase instillation via Chest Tube Procedure     Indication/ pre procedure diagnosis: empyema   Post procedure diagnosis: same     Chest tube clamped. Aseptic technique was observed. Dornase nicholas 5 mg/30ml followed by Alteplase 10 mg/30ml instilled into LEFT pleural cavity via self sealing chest tube.   Instructions given to nursing to reposition pt q 15 minutes x4, and unclamp tube and place back on wall suction at 20mmhg after 1 hour at 0645.  Pt tolerated procedure well.     PATRICIA MCKEON PA-C  06/03/25  Pulmonary, Critical Care Medicine  [facility]

## 2025-06-03 NOTE — PROCEDURES
[facility]  Pulmonary, Critical Care, and Sleep Medicine  TPA/Dornase instillation via Chest Tube Procedure     Indication/ pre procedure diagnosis: empyema   Post procedure diagnosis: same     Chest tube clamped. Aseptic technique was observed. Dornase nicholas 5 mg/30ml followed by Alteplase 10 mg/30ml instilled into LEFT pleural cavity via self sealing chest tube.   Instructions given to nursing to reposition pt q 15 minutes x4, and unclamp tube and place back on wall suction at 20mmhg after 1 hour at 1924  Pt tolerated procedure well.     Micaela Krishna PA-C  06/03/25  Pulmonary, Critical Care Medicine  [facility]

## 2025-06-04 ENCOUNTER — APPOINTMENT (OUTPATIENT)
Facility: HOSPITAL | Age: 75
DRG: 177 | End: 2025-06-04
Attending: INTERNAL MEDICINE
Payer: MEDICARE

## 2025-06-04 PROCEDURE — APPSS15 APP SPLIT SHARED TIME 0-15 MINUTES

## 2025-06-04 PROCEDURE — 2140000001 HC CVICU INTERMEDIATE R&B

## 2025-06-04 PROCEDURE — 2500000003 HC RX 250 WO HCPCS: Performed by: INTERNAL MEDICINE

## 2025-06-04 PROCEDURE — 71250 CT THORAX DX C-: CPT

## 2025-06-04 PROCEDURE — 6370000000 HC RX 637 (ALT 250 FOR IP): Performed by: INTERNAL MEDICINE

## 2025-06-04 PROCEDURE — 99232 SBSQ HOSP IP/OBS MODERATE 35: CPT | Performed by: INTERNAL MEDICINE

## 2025-06-04 PROCEDURE — 94761 N-INVAS EAR/PLS OXIMETRY MLT: CPT

## 2025-06-04 PROCEDURE — 71045 X-RAY EXAM CHEST 1 VIEW: CPT

## 2025-06-04 PROCEDURE — 6360000002 HC RX W HCPCS: Performed by: INTERNAL MEDICINE

## 2025-06-04 RX ORDER — IPRATROPIUM BROMIDE AND ALBUTEROL SULFATE 2.5; .5 MG/3ML; MG/3ML
1 SOLUTION RESPIRATORY (INHALATION) EVERY 4 HOURS PRN
Status: DISCONTINUED | OUTPATIENT
Start: 2025-06-04 | End: 2025-06-10 | Stop reason: HOSPADM

## 2025-06-04 RX ADMIN — ACETAMINOPHEN 650 MG: 325 TABLET ORAL at 19:40

## 2025-06-04 RX ADMIN — ENOXAPARIN SODIUM 40 MG: 100 INJECTION SUBCUTANEOUS at 09:39

## 2025-06-04 RX ADMIN — THERA TABS 1 TABLET: TAB at 09:37

## 2025-06-04 RX ADMIN — SODIUM CHLORIDE, PRESERVATIVE FREE 10 ML: 5 INJECTION INTRAVENOUS at 09:38

## 2025-06-04 RX ADMIN — LINEZOLID 600 MG: 600 TABLET, FILM COATED ORAL at 09:37

## 2025-06-04 RX ADMIN — SODIUM CHLORIDE, PRESERVATIVE FREE 10 ML: 5 INJECTION INTRAVENOUS at 20:17

## 2025-06-04 RX ADMIN — LEVOTHYROXINE SODIUM 200 MCG: 200 TABLET ORAL at 05:54

## 2025-06-04 RX ADMIN — ROSUVASTATIN CALCIUM 10 MG: 10 TABLET, FILM COATED ORAL at 09:37

## 2025-06-04 RX ADMIN — PANTOPRAZOLE SODIUM 40 MG: 40 TABLET, DELAYED RELEASE ORAL at 05:54

## 2025-06-04 RX ADMIN — LINEZOLID 600 MG: 600 TABLET, FILM COATED ORAL at 20:16

## 2025-06-04 RX ADMIN — Medication 1 CAPSULE: at 09:37

## 2025-06-04 ASSESSMENT — PAIN DESCRIPTION - ONSET: ONSET: GRADUAL

## 2025-06-04 ASSESSMENT — PAIN DESCRIPTION - DESCRIPTORS: DESCRIPTORS: BURNING

## 2025-06-04 ASSESSMENT — PAIN SCALES - GENERAL
PAINLEVEL_OUTOF10: 0
PAINLEVEL_OUTOF10: 0
PAINLEVEL_OUTOF10: 4
PAINLEVEL_OUTOF10: 0
PAINLEVEL_OUTOF10: 0

## 2025-06-04 ASSESSMENT — PAIN - FUNCTIONAL ASSESSMENT: PAIN_FUNCTIONAL_ASSESSMENT: ACTIVITIES ARE NOT PREVENTED

## 2025-06-04 ASSESSMENT — PAIN DESCRIPTION - FREQUENCY: FREQUENCY: CONTINUOUS

## 2025-06-04 ASSESSMENT — PAIN DESCRIPTION - ORIENTATION: ORIENTATION: RIGHT

## 2025-06-04 ASSESSMENT — PAIN DESCRIPTION - LOCATION: LOCATION: BUTTOCKS

## 2025-06-04 ASSESSMENT — PAIN DESCRIPTION - PAIN TYPE: TYPE: ACUTE PAIN

## 2025-06-05 ENCOUNTER — APPOINTMENT (OUTPATIENT)
Facility: HOSPITAL | Age: 75
DRG: 177 | End: 2025-06-05
Attending: INTERNAL MEDICINE
Payer: MEDICARE

## 2025-06-05 LAB
ANION GAP SERPL CALC-SCNC: 10 MMOL/L (ref 3–18)
BASOPHILS # BLD: 0.07 K/UL (ref 0–0.1)
BASOPHILS NFR BLD: 0.8 % (ref 0–2)
BUN SERPL-MCNC: 13 MG/DL (ref 6–23)
BUN/CREAT SERPL: 22 (ref 12–20)
CALCIUM SERPL-MCNC: 9.5 MG/DL (ref 8.5–10.1)
CHLORIDE SERPL-SCNC: 101 MMOL/L (ref 98–107)
CO2 SERPL-SCNC: 24 MMOL/L (ref 21–32)
CREAT SERPL-MCNC: 0.57 MG/DL (ref 0.6–1.3)
DIFFERENTIAL METHOD BLD: ABNORMAL
EOSINOPHIL # BLD: 0.24 K/UL (ref 0–0.4)
EOSINOPHIL NFR BLD: 2.8 % (ref 0–5)
ERYTHROCYTE [DISTWIDTH] IN BLOOD BY AUTOMATED COUNT: 17.7 % (ref 11.6–14.5)
GLUCOSE SERPL-MCNC: 90 MG/DL (ref 74–108)
HCT VFR BLD AUTO: 30 % (ref 35–45)
HGB BLD-MCNC: 9.2 G/DL (ref 12–16)
IMM GRANULOCYTES # BLD AUTO: 0.04 K/UL (ref 0–0.04)
IMM GRANULOCYTES NFR BLD AUTO: 0.5 % (ref 0–0.5)
LYMPHOCYTES # BLD: 1.18 K/UL (ref 0.9–3.6)
LYMPHOCYTES NFR BLD: 14 % (ref 21–52)
MCH RBC QN AUTO: 26.8 PG (ref 24–34)
MCHC RBC AUTO-ENTMCNC: 30.7 G/DL (ref 31–37)
MCV RBC AUTO: 87.5 FL (ref 78–100)
MONOCYTES # BLD: 0.43 K/UL (ref 0.05–1.2)
MONOCYTES NFR BLD: 5.1 % (ref 3–10)
NEUTS SEG # BLD: 6.47 K/UL (ref 1.8–8)
NEUTS SEG NFR BLD: 76.8 % (ref 40–73)
NRBC # BLD: 0 K/UL (ref 0–0.01)
NRBC BLD-RTO: 0 PER 100 WBC
PLATELET # BLD AUTO: 582 K/UL (ref 135–420)
PMV BLD AUTO: 8.4 FL (ref 9.2–11.8)
POTASSIUM SERPL-SCNC: 4.4 MMOL/L (ref 3.5–5.5)
RBC # BLD AUTO: 3.43 M/UL (ref 4.2–5.3)
SODIUM SERPL-SCNC: 136 MMOL/L (ref 136–145)
WBC # BLD AUTO: 8.4 K/UL (ref 4.6–13.2)

## 2025-06-05 PROCEDURE — 99232 SBSQ HOSP IP/OBS MODERATE 35: CPT | Performed by: INTERNAL MEDICINE

## 2025-06-05 PROCEDURE — 36415 COLL VENOUS BLD VENIPUNCTURE: CPT

## 2025-06-05 PROCEDURE — 6360000002 HC RX W HCPCS: Performed by: INTERNAL MEDICINE

## 2025-06-05 PROCEDURE — APPSS15 APP SPLIT SHARED TIME 0-15 MINUTES

## 2025-06-05 PROCEDURE — 94761 N-INVAS EAR/PLS OXIMETRY MLT: CPT

## 2025-06-05 PROCEDURE — 80048 BASIC METABOLIC PNL TOTAL CA: CPT

## 2025-06-05 PROCEDURE — 71045 X-RAY EXAM CHEST 1 VIEW: CPT

## 2025-06-05 PROCEDURE — 6370000000 HC RX 637 (ALT 250 FOR IP): Performed by: INTERNAL MEDICINE

## 2025-06-05 PROCEDURE — 97110 THERAPEUTIC EXERCISES: CPT

## 2025-06-05 PROCEDURE — 6370000000 HC RX 637 (ALT 250 FOR IP): Performed by: STUDENT IN AN ORGANIZED HEALTH CARE EDUCATION/TRAINING PROGRAM

## 2025-06-05 PROCEDURE — 2140000001 HC CVICU INTERMEDIATE R&B

## 2025-06-05 PROCEDURE — 2500000003 HC RX 250 WO HCPCS: Performed by: INTERNAL MEDICINE

## 2025-06-05 PROCEDURE — 85025 COMPLETE CBC W/AUTO DIFF WBC: CPT

## 2025-06-05 RX ADMIN — TRAMADOL HYDROCHLORIDE 50 MG: 50 TABLET, COATED ORAL at 21:16

## 2025-06-05 RX ADMIN — LINEZOLID 600 MG: 600 TABLET, FILM COATED ORAL at 10:15

## 2025-06-05 RX ADMIN — SODIUM CHLORIDE, PRESERVATIVE FREE 10 ML: 5 INJECTION INTRAVENOUS at 10:24

## 2025-06-05 RX ADMIN — LINEZOLID 600 MG: 600 TABLET, FILM COATED ORAL at 21:16

## 2025-06-05 RX ADMIN — LEVOTHYROXINE SODIUM 200 MCG: 200 TABLET ORAL at 05:20

## 2025-06-05 RX ADMIN — LOPERAMIDE HYDROCHLORIDE 2 MG: 2 CAPSULE ORAL at 12:42

## 2025-06-05 RX ADMIN — ROSUVASTATIN CALCIUM 10 MG: 10 TABLET, FILM COATED ORAL at 10:15

## 2025-06-05 RX ADMIN — ENOXAPARIN SODIUM 40 MG: 100 INJECTION SUBCUTANEOUS at 10:15

## 2025-06-05 RX ADMIN — SODIUM CHLORIDE, PRESERVATIVE FREE 10 ML: 5 INJECTION INTRAVENOUS at 21:20

## 2025-06-05 RX ADMIN — Medication 1 CAPSULE: at 10:15

## 2025-06-05 RX ADMIN — THERA TABS 1 TABLET: TAB at 10:15

## 2025-06-05 RX ADMIN — PANTOPRAZOLE SODIUM 40 MG: 40 TABLET, DELAYED RELEASE ORAL at 05:20

## 2025-06-05 ASSESSMENT — PAIN SCALES - GENERAL
PAINLEVEL_OUTOF10: 0
PAINLEVEL_OUTOF10: 6
PAINLEVEL_OUTOF10: 0
PAINLEVEL_OUTOF10: 1
PAINLEVEL_OUTOF10: 0

## 2025-06-05 ASSESSMENT — PAIN SCALES - WONG BAKER
WONGBAKER_NUMERICALRESPONSE: NO HURT

## 2025-06-05 ASSESSMENT — PAIN DESCRIPTION - DESCRIPTORS: DESCRIPTORS: SORE;BURNING

## 2025-06-05 ASSESSMENT — PAIN DESCRIPTION - ORIENTATION: ORIENTATION: POSTERIOR

## 2025-06-05 ASSESSMENT — PAIN - FUNCTIONAL ASSESSMENT: PAIN_FUNCTIONAL_ASSESSMENT: ACTIVITIES ARE NOT PREVENTED

## 2025-06-05 ASSESSMENT — PAIN DESCRIPTION - LOCATION: LOCATION: SACRUM

## 2025-06-06 ENCOUNTER — APPOINTMENT (OUTPATIENT)
Facility: HOSPITAL | Age: 75
DRG: 177 | End: 2025-06-06
Attending: INTERNAL MEDICINE
Payer: MEDICARE

## 2025-06-06 PROCEDURE — 6370000000 HC RX 637 (ALT 250 FOR IP): Performed by: INTERNAL MEDICINE

## 2025-06-06 PROCEDURE — 97530 THERAPEUTIC ACTIVITIES: CPT

## 2025-06-06 PROCEDURE — 2500000003 HC RX 250 WO HCPCS: Performed by: INTERNAL MEDICINE

## 2025-06-06 PROCEDURE — 71045 X-RAY EXAM CHEST 1 VIEW: CPT

## 2025-06-06 PROCEDURE — 97535 SELF CARE MNGMENT TRAINING: CPT

## 2025-06-06 PROCEDURE — 6360000002 HC RX W HCPCS: Performed by: INTERNAL MEDICINE

## 2025-06-06 PROCEDURE — 99232 SBSQ HOSP IP/OBS MODERATE 35: CPT | Performed by: INTERNAL MEDICINE

## 2025-06-06 PROCEDURE — 2140000001 HC CVICU INTERMEDIATE R&B

## 2025-06-06 PROCEDURE — 99232 SBSQ HOSP IP/OBS MODERATE 35: CPT | Performed by: HOSPITALIST

## 2025-06-06 PROCEDURE — APPSS15 APP SPLIT SHARED TIME 0-15 MINUTES

## 2025-06-06 RX ADMIN — SODIUM CHLORIDE, PRESERVATIVE FREE 10 ML: 5 INJECTION INTRAVENOUS at 08:17

## 2025-06-06 RX ADMIN — THERA TABS 1 TABLET: TAB at 08:15

## 2025-06-06 RX ADMIN — ACETAMINOPHEN 650 MG: 325 TABLET ORAL at 16:56

## 2025-06-06 RX ADMIN — Medication 1 CAPSULE: at 08:15

## 2025-06-06 RX ADMIN — LINEZOLID 600 MG: 600 TABLET, FILM COATED ORAL at 08:15

## 2025-06-06 RX ADMIN — ENOXAPARIN SODIUM 40 MG: 100 INJECTION SUBCUTANEOUS at 08:15

## 2025-06-06 RX ADMIN — PANTOPRAZOLE SODIUM 40 MG: 40 TABLET, DELAYED RELEASE ORAL at 05:35

## 2025-06-06 RX ADMIN — ROSUVASTATIN CALCIUM 10 MG: 10 TABLET, FILM COATED ORAL at 08:15

## 2025-06-06 RX ADMIN — SODIUM CHLORIDE, PRESERVATIVE FREE 10 ML: 5 INJECTION INTRAVENOUS at 19:53

## 2025-06-06 RX ADMIN — LEVOTHYROXINE SODIUM 200 MCG: 200 TABLET ORAL at 05:36

## 2025-06-06 RX ADMIN — TRAMADOL HYDROCHLORIDE 50 MG: 50 TABLET, COATED ORAL at 19:53

## 2025-06-06 RX ADMIN — LINEZOLID 600 MG: 600 TABLET, FILM COATED ORAL at 19:52

## 2025-06-06 ASSESSMENT — PAIN DESCRIPTION - DESCRIPTORS
DESCRIPTORS: BURNING
DESCRIPTORS: SORE

## 2025-06-06 ASSESSMENT — PAIN - FUNCTIONAL ASSESSMENT: PAIN_FUNCTIONAL_ASSESSMENT: ACTIVITIES ARE NOT PREVENTED

## 2025-06-06 ASSESSMENT — PAIN DESCRIPTION - LOCATION
LOCATION: SACRUM
LOCATION: BUTTOCKS

## 2025-06-06 ASSESSMENT — PAIN SCALES - GENERAL
PAINLEVEL_OUTOF10: 0
PAINLEVEL_OUTOF10: 5
PAINLEVEL_OUTOF10: 0
PAINLEVEL_OUTOF10: 6

## 2025-06-06 ASSESSMENT — PAIN DESCRIPTION - ORIENTATION
ORIENTATION: POSTERIOR
ORIENTATION: MID

## 2025-06-07 ENCOUNTER — APPOINTMENT (OUTPATIENT)
Facility: HOSPITAL | Age: 75
DRG: 177 | End: 2025-06-07
Attending: INTERNAL MEDICINE
Payer: MEDICARE

## 2025-06-07 LAB
ANION GAP SERPL CALC-SCNC: 10 MMOL/L (ref 3–18)
ANION GAP SERPL CALC-SCNC: 11 MMOL/L (ref 3–18)
BASOPHILS # BLD: 0.06 K/UL (ref 0–0.1)
BASOPHILS NFR BLD: 0.7 % (ref 0–2)
BUN SERPL-MCNC: 14 MG/DL (ref 6–23)
BUN SERPL-MCNC: 18 MG/DL (ref 6–23)
BUN/CREAT SERPL: 21 (ref 12–20)
BUN/CREAT SERPL: 25 (ref 12–20)
CALCIUM SERPL-MCNC: 9.4 MG/DL (ref 8.5–10.1)
CALCIUM SERPL-MCNC: 9.5 MG/DL (ref 8.5–10.1)
CHLORIDE SERPL-SCNC: 101 MMOL/L (ref 98–107)
CHLORIDE SERPL-SCNC: 102 MMOL/L (ref 98–107)
CO2 SERPL-SCNC: 24 MMOL/L (ref 21–32)
CO2 SERPL-SCNC: 25 MMOL/L (ref 21–32)
CREAT SERPL-MCNC: 0.67 MG/DL (ref 0.6–1.3)
CREAT SERPL-MCNC: 0.71 MG/DL (ref 0.6–1.3)
DIFFERENTIAL METHOD BLD: ABNORMAL
EOSINOPHIL # BLD: 0.28 K/UL (ref 0–0.4)
EOSINOPHIL NFR BLD: 3.4 % (ref 0–5)
ERYTHROCYTE [DISTWIDTH] IN BLOOD BY AUTOMATED COUNT: 17.6 % (ref 11.6–14.5)
ERYTHROCYTE [DISTWIDTH] IN BLOOD BY AUTOMATED COUNT: 17.9 % (ref 11.6–14.5)
GLUCOSE SERPL-MCNC: 96 MG/DL (ref 74–108)
GLUCOSE SERPL-MCNC: 97 MG/DL (ref 74–108)
HCT VFR BLD AUTO: 28.2 % (ref 35–45)
HCT VFR BLD AUTO: 30.9 % (ref 35–45)
HGB BLD-MCNC: 8.7 G/DL (ref 12–16)
HGB BLD-MCNC: 9.4 G/DL (ref 12–16)
IMM GRANULOCYTES # BLD AUTO: 0.02 K/UL (ref 0–0.04)
IMM GRANULOCYTES NFR BLD AUTO: 0.2 % (ref 0–0.5)
LYMPHOCYTES # BLD: 0.95 K/UL (ref 0.9–3.6)
LYMPHOCYTES NFR BLD: 11.6 % (ref 21–52)
MCH RBC QN AUTO: 26.6 PG (ref 24–34)
MCH RBC QN AUTO: 27.4 PG (ref 24–34)
MCHC RBC AUTO-ENTMCNC: 30.4 G/DL (ref 31–37)
MCHC RBC AUTO-ENTMCNC: 30.9 G/DL (ref 31–37)
MCV RBC AUTO: 87.3 FL (ref 78–100)
MCV RBC AUTO: 89 FL (ref 78–100)
MONOCYTES # BLD: 0.42 K/UL (ref 0.05–1.2)
MONOCYTES NFR BLD: 5.1 % (ref 3–10)
NEUTS SEG # BLD: 6.45 K/UL (ref 1.8–8)
NEUTS SEG NFR BLD: 79 % (ref 40–73)
NRBC # BLD: 0 K/UL (ref 0–0.01)
NRBC # BLD: 0 K/UL (ref 0–0.01)
NRBC BLD-RTO: 0 PER 100 WBC
NRBC BLD-RTO: 0 PER 100 WBC
PLATELET # BLD AUTO: 506 K/UL (ref 135–420)
PLATELET # BLD AUTO: 521 K/UL (ref 135–420)
PMV BLD AUTO: 8.5 FL (ref 9.2–11.8)
PMV BLD AUTO: 8.5 FL (ref 9.2–11.8)
POTASSIUM SERPL-SCNC: 3.9 MMOL/L (ref 3.5–5.5)
POTASSIUM SERPL-SCNC: 4.3 MMOL/L (ref 3.5–5.5)
RBC # BLD AUTO: 3.17 M/UL (ref 4.2–5.3)
RBC # BLD AUTO: 3.54 M/UL (ref 4.2–5.3)
SODIUM SERPL-SCNC: 136 MMOL/L (ref 136–145)
SODIUM SERPL-SCNC: 137 MMOL/L (ref 136–145)
WBC # BLD AUTO: 8 K/UL (ref 4.6–13.2)
WBC # BLD AUTO: 8.2 K/UL (ref 4.6–13.2)

## 2025-06-07 PROCEDURE — 0WPBX0Z REMOVAL OF DRAINAGE DEVICE FROM LEFT PLEURAL CAVITY, EXTERNAL APPROACH: ICD-10-PCS | Performed by: HOSPITALIST

## 2025-06-07 PROCEDURE — 99232 SBSQ HOSP IP/OBS MODERATE 35: CPT | Performed by: HOSPITALIST

## 2025-06-07 PROCEDURE — 6370000000 HC RX 637 (ALT 250 FOR IP): Performed by: INTERNAL MEDICINE

## 2025-06-07 PROCEDURE — 2500000003 HC RX 250 WO HCPCS: Performed by: INTERNAL MEDICINE

## 2025-06-07 PROCEDURE — 2140000001 HC CVICU INTERMEDIATE R&B

## 2025-06-07 PROCEDURE — 71045 X-RAY EXAM CHEST 1 VIEW: CPT

## 2025-06-07 PROCEDURE — 36415 COLL VENOUS BLD VENIPUNCTURE: CPT

## 2025-06-07 PROCEDURE — 85027 COMPLETE CBC AUTOMATED: CPT

## 2025-06-07 PROCEDURE — 80048 BASIC METABOLIC PNL TOTAL CA: CPT

## 2025-06-07 PROCEDURE — 6360000002 HC RX W HCPCS: Performed by: INTERNAL MEDICINE

## 2025-06-07 PROCEDURE — 85025 COMPLETE CBC W/AUTO DIFF WBC: CPT

## 2025-06-07 RX ORDER — ACETAMINOPHEN 325 MG/1
325 TABLET ORAL EVERY 4 HOURS PRN
Status: DISCONTINUED | OUTPATIENT
Start: 2025-06-07 | End: 2025-06-10 | Stop reason: HOSPADM

## 2025-06-07 RX ORDER — TRAMADOL HYDROCHLORIDE 50 MG/1
25 TABLET ORAL EVERY 4 HOURS PRN
Status: DISCONTINUED | OUTPATIENT
Start: 2025-06-07 | End: 2025-06-10 | Stop reason: HOSPADM

## 2025-06-07 RX ADMIN — ROSUVASTATIN CALCIUM 10 MG: 10 TABLET, FILM COATED ORAL at 07:58

## 2025-06-07 RX ADMIN — SODIUM CHLORIDE, PRESERVATIVE FREE 10 ML: 5 INJECTION INTRAVENOUS at 21:09

## 2025-06-07 RX ADMIN — PANTOPRAZOLE SODIUM 40 MG: 40 TABLET, DELAYED RELEASE ORAL at 06:46

## 2025-06-07 RX ADMIN — THERA TABS 1 TABLET: TAB at 07:58

## 2025-06-07 RX ADMIN — ENOXAPARIN SODIUM 40 MG: 100 INJECTION SUBCUTANEOUS at 07:58

## 2025-06-07 RX ADMIN — SODIUM CHLORIDE, PRESERVATIVE FREE 10 ML: 5 INJECTION INTRAVENOUS at 07:59

## 2025-06-07 RX ADMIN — Medication 1 CAPSULE: at 07:58

## 2025-06-07 RX ADMIN — LEVOTHYROXINE SODIUM 200 MCG: 200 TABLET ORAL at 06:46

## 2025-06-07 RX ADMIN — LINEZOLID 600 MG: 600 TABLET, FILM COATED ORAL at 21:08

## 2025-06-07 RX ADMIN — LINEZOLID 600 MG: 600 TABLET, FILM COATED ORAL at 07:58

## 2025-06-07 ASSESSMENT — PAIN SCALES - GENERAL
PAINLEVEL_OUTOF10: 0

## 2025-06-07 ASSESSMENT — PAIN SCALES - WONG BAKER
WONGBAKER_NUMERICALRESPONSE: NO HURT
WONGBAKER_NUMERICALRESPONSE: NO HURT

## 2025-06-07 NOTE — PROCEDURES
PROCEDURE NOTE  Date: 6/7/2025   Name: Cori Dennis  YOB: 1950    Procedures        06/07/25    CXR Reviewed.  Unchanged L sided effusion and PTX  Chest tube status:  water seal overnight  Procedure explained to patient. Need for pre-medication accessed.  Sutures securing L Chest tube removed with suture removal kit. No burried sutures retained.  Site cleaned, xeroform dressing applied. Bulky dressing 4x4 guaze pads applied.  Chest tube removed with a single sweep during patient expiration with humming sounds.  No resistance noted during removal.  Patient tolerated the procedure well.  Vitals remain stable.  No need for post procedure CXR

## 2025-06-08 ENCOUNTER — APPOINTMENT (OUTPATIENT)
Facility: HOSPITAL | Age: 75
DRG: 177 | End: 2025-06-08
Attending: INTERNAL MEDICINE
Payer: MEDICARE

## 2025-06-08 LAB
ANION GAP SERPL CALC-SCNC: 11 MMOL/L (ref 3–18)
BUN SERPL-MCNC: 18 MG/DL (ref 6–23)
BUN/CREAT SERPL: 25 (ref 12–20)
CALCIUM SERPL-MCNC: 9.1 MG/DL (ref 8.5–10.1)
CHLORIDE SERPL-SCNC: 102 MMOL/L (ref 98–107)
CO2 SERPL-SCNC: 25 MMOL/L (ref 21–32)
CREAT SERPL-MCNC: 0.7 MG/DL (ref 0.6–1.3)
ERYTHROCYTE [DISTWIDTH] IN BLOOD BY AUTOMATED COUNT: 17.9 % (ref 11.6–14.5)
GLUCOSE SERPL-MCNC: 89 MG/DL (ref 74–108)
HCT VFR BLD AUTO: 27.7 % (ref 35–45)
HGB BLD-MCNC: 8.4 G/DL (ref 12–16)
MCH RBC QN AUTO: 26.6 PG (ref 24–34)
MCHC RBC AUTO-ENTMCNC: 30.3 G/DL (ref 31–37)
MCV RBC AUTO: 87.7 FL (ref 78–100)
NRBC # BLD: 0 K/UL (ref 0–0.01)
NRBC BLD-RTO: 0 PER 100 WBC
PLATELET # BLD AUTO: 454 K/UL (ref 135–420)
PMV BLD AUTO: 8.6 FL (ref 9.2–11.8)
POTASSIUM SERPL-SCNC: 4.2 MMOL/L (ref 3.5–5.5)
RBC # BLD AUTO: 3.16 M/UL (ref 4.2–5.3)
SODIUM SERPL-SCNC: 138 MMOL/L (ref 136–145)
WBC # BLD AUTO: 8.4 K/UL (ref 4.6–13.2)

## 2025-06-08 PROCEDURE — 6370000000 HC RX 637 (ALT 250 FOR IP): Performed by: INTERNAL MEDICINE

## 2025-06-08 PROCEDURE — 36415 COLL VENOUS BLD VENIPUNCTURE: CPT

## 2025-06-08 PROCEDURE — 6360000002 HC RX W HCPCS: Performed by: INTERNAL MEDICINE

## 2025-06-08 PROCEDURE — 99232 SBSQ HOSP IP/OBS MODERATE 35: CPT | Performed by: HOSPITALIST

## 2025-06-08 PROCEDURE — 80048 BASIC METABOLIC PNL TOTAL CA: CPT

## 2025-06-08 PROCEDURE — 2500000003 HC RX 250 WO HCPCS: Performed by: INTERNAL MEDICINE

## 2025-06-08 PROCEDURE — 85027 COMPLETE CBC AUTOMATED: CPT

## 2025-06-08 PROCEDURE — 71045 X-RAY EXAM CHEST 1 VIEW: CPT

## 2025-06-08 PROCEDURE — 1100000000 HC RM PRIVATE

## 2025-06-08 PROCEDURE — 94761 N-INVAS EAR/PLS OXIMETRY MLT: CPT

## 2025-06-08 RX ADMIN — ROSUVASTATIN CALCIUM 10 MG: 10 TABLET, FILM COATED ORAL at 08:32

## 2025-06-08 RX ADMIN — Medication 1 CAPSULE: at 08:32

## 2025-06-08 RX ADMIN — LINEZOLID 600 MG: 600 TABLET, FILM COATED ORAL at 08:32

## 2025-06-08 RX ADMIN — THERA TABS 1 TABLET: TAB at 08:32

## 2025-06-08 RX ADMIN — SODIUM CHLORIDE, PRESERVATIVE FREE 10 ML: 5 INJECTION INTRAVENOUS at 08:32

## 2025-06-08 RX ADMIN — PANTOPRAZOLE SODIUM 40 MG: 40 TABLET, DELAYED RELEASE ORAL at 05:27

## 2025-06-08 RX ADMIN — LINEZOLID 600 MG: 600 TABLET, FILM COATED ORAL at 22:30

## 2025-06-08 RX ADMIN — LEVOTHYROXINE SODIUM 200 MCG: 200 TABLET ORAL at 05:27

## 2025-06-08 RX ADMIN — SODIUM CHLORIDE, PRESERVATIVE FREE 10 ML: 5 INJECTION INTRAVENOUS at 22:35

## 2025-06-08 RX ADMIN — ENOXAPARIN SODIUM 40 MG: 100 INJECTION SUBCUTANEOUS at 08:32

## 2025-06-08 ASSESSMENT — PAIN SCALES - GENERAL
PAINLEVEL_OUTOF10: 0

## 2025-06-09 ENCOUNTER — APPOINTMENT (OUTPATIENT)
Facility: HOSPITAL | Age: 75
DRG: 177 | End: 2025-06-09
Attending: INTERNAL MEDICINE
Payer: MEDICARE

## 2025-06-09 LAB
ANION GAP SERPL CALC-SCNC: 10 MMOL/L (ref 3–18)
ANION GAP SERPL CALC-SCNC: 10 MMOL/L (ref 3–18)
BACTERIA SPEC CULT: NORMAL
BASOPHILS # BLD: 0.06 K/UL (ref 0–0.1)
BASOPHILS NFR BLD: 0.6 % (ref 0–2)
BUN SERPL-MCNC: 16 MG/DL (ref 6–23)
BUN SERPL-MCNC: 18 MG/DL (ref 6–23)
BUN/CREAT SERPL: 24 (ref 12–20)
BUN/CREAT SERPL: 28 (ref 12–20)
CALCIUM SERPL-MCNC: 9.2 MG/DL (ref 8.5–10.1)
CALCIUM SERPL-MCNC: 9.7 MG/DL (ref 8.5–10.1)
CHLORIDE SERPL-SCNC: 100 MMOL/L (ref 98–107)
CHLORIDE SERPL-SCNC: 103 MMOL/L (ref 98–107)
CO2 SERPL-SCNC: 26 MMOL/L (ref 21–32)
CO2 SERPL-SCNC: 27 MMOL/L (ref 21–32)
CREAT SERPL-MCNC: 0.63 MG/DL (ref 0.6–1.3)
CREAT SERPL-MCNC: 0.65 MG/DL (ref 0.6–1.3)
DIFFERENTIAL METHOD BLD: ABNORMAL
EOSINOPHIL # BLD: 0.13 K/UL (ref 0–0.4)
EOSINOPHIL NFR BLD: 1.4 % (ref 0–5)
ERYTHROCYTE [DISTWIDTH] IN BLOOD BY AUTOMATED COUNT: 17.8 % (ref 11.6–14.5)
ERYTHROCYTE [DISTWIDTH] IN BLOOD BY AUTOMATED COUNT: 17.9 % (ref 11.6–14.5)
GLUCOSE SERPL-MCNC: 100 MG/DL (ref 74–108)
GLUCOSE SERPL-MCNC: 90 MG/DL (ref 74–108)
HCT VFR BLD AUTO: 27.7 % (ref 35–45)
HCT VFR BLD AUTO: 32.8 % (ref 35–45)
HGB BLD-MCNC: 8.3 G/DL (ref 12–16)
HGB BLD-MCNC: 9.6 G/DL (ref 12–16)
IMM GRANULOCYTES # BLD AUTO: 0.03 K/UL (ref 0–0.04)
IMM GRANULOCYTES NFR BLD AUTO: 0.3 % (ref 0–0.5)
LYMPHOCYTES # BLD: 1.19 K/UL (ref 0.9–3.6)
LYMPHOCYTES NFR BLD: 12.6 % (ref 21–52)
MCH RBC QN AUTO: 25.9 PG (ref 24–34)
MCH RBC QN AUTO: 26.4 PG (ref 24–34)
MCHC RBC AUTO-ENTMCNC: 29.3 G/DL (ref 31–37)
MCHC RBC AUTO-ENTMCNC: 30 G/DL (ref 31–37)
MCV RBC AUTO: 88.2 FL (ref 78–100)
MCV RBC AUTO: 88.6 FL (ref 78–100)
MONOCYTES # BLD: 0.38 K/UL (ref 0.05–1.2)
MONOCYTES NFR BLD: 4 % (ref 3–10)
NEUTS SEG # BLD: 7.69 K/UL (ref 1.8–8)
NEUTS SEG NFR BLD: 81.1 % (ref 40–73)
NRBC # BLD: 0 K/UL (ref 0–0.01)
NRBC # BLD: 0 K/UL (ref 0–0.01)
NRBC BLD-RTO: 0 PER 100 WBC
NRBC BLD-RTO: 0 PER 100 WBC
PLATELET # BLD AUTO: 396 K/UL (ref 135–420)
PLATELET # BLD AUTO: 433 K/UL (ref 135–420)
PMV BLD AUTO: 8.8 FL (ref 9.2–11.8)
PMV BLD AUTO: 9.1 FL (ref 9.2–11.8)
POTASSIUM SERPL-SCNC: 3.9 MMOL/L (ref 3.5–5.5)
POTASSIUM SERPL-SCNC: 4.1 MMOL/L (ref 3.5–5.5)
RBC # BLD AUTO: 3.14 M/UL (ref 4.2–5.3)
RBC # BLD AUTO: 3.7 M/UL (ref 4.2–5.3)
SERVICE CMNT-IMP: NORMAL
SODIUM SERPL-SCNC: 136 MMOL/L (ref 136–145)
SODIUM SERPL-SCNC: 140 MMOL/L (ref 136–145)
WBC # BLD AUTO: 8.1 K/UL (ref 4.6–13.2)
WBC # BLD AUTO: 9.5 K/UL (ref 4.6–13.2)

## 2025-06-09 PROCEDURE — 97164 PT RE-EVAL EST PLAN CARE: CPT

## 2025-06-09 PROCEDURE — 2500000003 HC RX 250 WO HCPCS: Performed by: INTERNAL MEDICINE

## 2025-06-09 PROCEDURE — 6370000000 HC RX 637 (ALT 250 FOR IP): Performed by: INTERNAL MEDICINE

## 2025-06-09 PROCEDURE — 85025 COMPLETE CBC W/AUTO DIFF WBC: CPT

## 2025-06-09 PROCEDURE — 97116 GAIT TRAINING THERAPY: CPT

## 2025-06-09 PROCEDURE — 6360000002 HC RX W HCPCS: Performed by: INTERNAL MEDICINE

## 2025-06-09 PROCEDURE — 97535 SELF CARE MNGMENT TRAINING: CPT

## 2025-06-09 PROCEDURE — 71045 X-RAY EXAM CHEST 1 VIEW: CPT

## 2025-06-09 PROCEDURE — P9041 ALBUMIN (HUMAN),5%, 50ML: HCPCS | Performed by: INTERNAL MEDICINE

## 2025-06-09 PROCEDURE — 97530 THERAPEUTIC ACTIVITIES: CPT

## 2025-06-09 PROCEDURE — 36415 COLL VENOUS BLD VENIPUNCTURE: CPT

## 2025-06-09 PROCEDURE — 80048 BASIC METABOLIC PNL TOTAL CA: CPT

## 2025-06-09 PROCEDURE — 85027 COMPLETE CBC AUTOMATED: CPT

## 2025-06-09 PROCEDURE — 1100000000 HC RM PRIVATE

## 2025-06-09 RX ORDER — ALBUMIN HUMAN 50 G/1000ML
25 SOLUTION INTRAVENOUS EVERY 12 HOURS
Status: DISCONTINUED | OUTPATIENT
Start: 2025-06-09 | End: 2025-06-10 | Stop reason: HOSPADM

## 2025-06-09 RX ORDER — MECOBALAMIN 5000 MCG
5 TABLET,DISINTEGRATING ORAL NIGHTLY PRN
Status: DISCONTINUED | OUTPATIENT
Start: 2025-06-09 | End: 2025-06-10 | Stop reason: HOSPADM

## 2025-06-09 RX ADMIN — ROSUVASTATIN CALCIUM 10 MG: 10 TABLET, FILM COATED ORAL at 09:09

## 2025-06-09 RX ADMIN — ENOXAPARIN SODIUM 40 MG: 100 INJECTION SUBCUTANEOUS at 09:09

## 2025-06-09 RX ADMIN — LINEZOLID 600 MG: 600 TABLET, FILM COATED ORAL at 09:09

## 2025-06-09 RX ADMIN — Medication 1 CAPSULE: at 09:09

## 2025-06-09 RX ADMIN — PANTOPRAZOLE SODIUM 40 MG: 40 TABLET, DELAYED RELEASE ORAL at 05:47

## 2025-06-09 RX ADMIN — ACETAMINOPHEN 650 MG: 325 TABLET ORAL at 15:18

## 2025-06-09 RX ADMIN — LINEZOLID 600 MG: 600 TABLET, FILM COATED ORAL at 20:30

## 2025-06-09 RX ADMIN — SODIUM CHLORIDE, PRESERVATIVE FREE 10 ML: 5 INJECTION INTRAVENOUS at 20:12

## 2025-06-09 RX ADMIN — SODIUM CHLORIDE, PRESERVATIVE FREE 10 ML: 5 INJECTION INTRAVENOUS at 09:09

## 2025-06-09 RX ADMIN — THERA TABS 1 TABLET: TAB at 09:09

## 2025-06-09 RX ADMIN — LEVOTHYROXINE SODIUM 200 MCG: 200 TABLET ORAL at 05:47

## 2025-06-09 RX ADMIN — ALBUMIN (HUMAN) 25 G: 12.5 INJECTION, SOLUTION INTRAVENOUS at 20:20

## 2025-06-09 ASSESSMENT — PAIN SCALES - GENERAL
PAINLEVEL_OUTOF10: 4
PAINLEVEL_OUTOF10: 0

## 2025-06-09 ASSESSMENT — PAIN DESCRIPTION - DESCRIPTORS: DESCRIPTORS: ACHING

## 2025-06-09 ASSESSMENT — PAIN DESCRIPTION - LOCATION: LOCATION: HEAD

## 2025-06-10 VITALS
BODY MASS INDEX: 19.16 KG/M2 | RESPIRATION RATE: 22 BRPM | WEIGHT: 115 LBS | OXYGEN SATURATION: 97 % | HEART RATE: 77 BPM | DIASTOLIC BLOOD PRESSURE: 73 MMHG | TEMPERATURE: 98.7 F | HEIGHT: 65 IN | SYSTOLIC BLOOD PRESSURE: 133 MMHG

## 2025-06-10 LAB
ALBUMIN SERPL-MCNC: 2.4 G/DL (ref 3.4–5)
ALBUMIN/GLOB SERPL: 0.7 (ref 0.8–1.7)
ALP SERPL-CCNC: 107 U/L (ref 45–117)
ALT SERPL-CCNC: 19 U/L (ref 10–35)
ANION GAP SERPL CALC-SCNC: 10 MMOL/L (ref 3–18)
AST SERPL-CCNC: 23 U/L (ref 10–38)
BASOPHILS # BLD: 0.06 K/UL (ref 0–0.1)
BASOPHILS NFR BLD: 0.8 % (ref 0–2)
BILIRUB SERPL-MCNC: 0.3 MG/DL (ref 0.2–1)
BUN SERPL-MCNC: 16 MG/DL (ref 6–23)
BUN/CREAT SERPL: 22 (ref 12–20)
CALCIUM SERPL-MCNC: 9.1 MG/DL (ref 8.5–10.1)
CHLORIDE SERPL-SCNC: 104 MMOL/L (ref 98–107)
CO2 SERPL-SCNC: 26 MMOL/L (ref 21–32)
CREAT SERPL-MCNC: 0.75 MG/DL (ref 0.6–1.3)
DIFFERENTIAL METHOD BLD: ABNORMAL
EOSINOPHIL # BLD: 0.2 K/UL (ref 0–0.4)
EOSINOPHIL NFR BLD: 2.6 % (ref 0–5)
ERYTHROCYTE [DISTWIDTH] IN BLOOD BY AUTOMATED COUNT: 17.5 % (ref 11.6–14.5)
GLOBULIN SER CALC-MCNC: 3.7 G/DL (ref 2–4)
GLUCOSE SERPL-MCNC: 94 MG/DL (ref 74–108)
HCT VFR BLD AUTO: 25.6 % (ref 35–45)
HCT VFR BLD AUTO: 29.9 % (ref 35–45)
HGB BLD-MCNC: 7.7 G/DL (ref 12–16)
HGB BLD-MCNC: 9.1 G/DL (ref 12–16)
IMM GRANULOCYTES # BLD AUTO: 0.02 K/UL (ref 0–0.04)
IMM GRANULOCYTES NFR BLD AUTO: 0.3 % (ref 0–0.5)
LYMPHOCYTES # BLD: 1.33 K/UL (ref 0.9–3.6)
LYMPHOCYTES NFR BLD: 17.4 % (ref 21–52)
MCH RBC QN AUTO: 26.6 PG (ref 24–34)
MCHC RBC AUTO-ENTMCNC: 30.1 G/DL (ref 31–37)
MCV RBC AUTO: 88.3 FL (ref 78–100)
MONOCYTES # BLD: 0.43 K/UL (ref 0.05–1.2)
MONOCYTES NFR BLD: 5.6 % (ref 3–10)
NEUTS SEG # BLD: 5.59 K/UL (ref 1.8–8)
NEUTS SEG NFR BLD: 73.3 % (ref 40–73)
NRBC # BLD: 0 K/UL (ref 0–0.01)
NRBC BLD-RTO: 0 PER 100 WBC
PLATELET # BLD AUTO: 332 K/UL (ref 135–420)
PMV BLD AUTO: 8.6 FL (ref 9.2–11.8)
POTASSIUM SERPL-SCNC: 3.8 MMOL/L (ref 3.5–5.5)
PROT SERPL-MCNC: 6.1 G/DL (ref 6.4–8.2)
RBC # BLD AUTO: 2.9 M/UL (ref 4.2–5.3)
SARS-COV-2 RDRP RESP QL NAA+PROBE: NOT DETECTED
SODIUM SERPL-SCNC: 140 MMOL/L (ref 136–145)
SOURCE: NORMAL
WBC # BLD AUTO: 7.6 K/UL (ref 4.6–13.2)

## 2025-06-10 PROCEDURE — 85014 HEMATOCRIT: CPT

## 2025-06-10 PROCEDURE — 6360000002 HC RX W HCPCS: Performed by: INTERNAL MEDICINE

## 2025-06-10 PROCEDURE — 80053 COMPREHEN METABOLIC PANEL: CPT

## 2025-06-10 PROCEDURE — 85018 HEMOGLOBIN: CPT

## 2025-06-10 PROCEDURE — 6370000000 HC RX 637 (ALT 250 FOR IP): Performed by: INTERNAL MEDICINE

## 2025-06-10 PROCEDURE — 85025 COMPLETE CBC W/AUTO DIFF WBC: CPT

## 2025-06-10 PROCEDURE — 2500000003 HC RX 250 WO HCPCS: Performed by: INTERNAL MEDICINE

## 2025-06-10 PROCEDURE — 36415 COLL VENOUS BLD VENIPUNCTURE: CPT

## 2025-06-10 PROCEDURE — 87635 SARS-COV-2 COVID-19 AMP PRB: CPT

## 2025-06-10 RX ORDER — LINEZOLID 600 MG/1
600 TABLET, FILM COATED ORAL EVERY 12 HOURS SCHEDULED
Qty: 22 TABLET | Refills: 0 | Status: SHIPPED | OUTPATIENT
Start: 2025-06-10 | End: 2025-06-21

## 2025-06-10 RX ORDER — ACETAMINOPHEN 500 MG
1 TABLET ORAL 2 TIMES DAILY
Qty: 28 CAPSULE | Refills: 0 | Status: SHIPPED | OUTPATIENT
Start: 2025-06-10 | End: 2025-06-24

## 2025-06-10 RX ADMIN — THERA TABS 1 TABLET: TAB at 09:57

## 2025-06-10 RX ADMIN — Medication 1 CAPSULE: at 09:57

## 2025-06-10 RX ADMIN — PANTOPRAZOLE SODIUM 40 MG: 40 TABLET, DELAYED RELEASE ORAL at 06:11

## 2025-06-10 RX ADMIN — ENOXAPARIN SODIUM 40 MG: 100 INJECTION SUBCUTANEOUS at 09:57

## 2025-06-10 RX ADMIN — LEVOTHYROXINE SODIUM 200 MCG: 200 TABLET ORAL at 06:11

## 2025-06-10 RX ADMIN — LINEZOLID 600 MG: 600 TABLET, FILM COATED ORAL at 09:57

## 2025-06-10 RX ADMIN — SODIUM CHLORIDE, PRESERVATIVE FREE 10 ML: 5 INJECTION INTRAVENOUS at 10:00

## 2025-06-10 RX ADMIN — ROSUVASTATIN CALCIUM 10 MG: 10 TABLET, FILM COATED ORAL at 09:57

## 2025-06-10 ASSESSMENT — PAIN SCALES - GENERAL
PAINLEVEL_OUTOF10: 0
PAINLEVEL_OUTOF10: 0

## 2025-06-10 NOTE — PLAN OF CARE
Problem: Discharge Planning  Goal: Discharge to home or other facility with appropriate resources  6/1/2025 1358 by Micheline Rivera, RN  Outcome: Progressing  Flowsheets (Taken 6/1/2025 1358)  Discharge to home or other facility with appropriate resources: Identify barriers to discharge with patient and caregiver  Note: Intrapleural medication, 2 doses remaining. Monitor chest tube     Problem: Safety - Adult  Goal: Free from fall injury  6/1/2025 1358 by Micheline Rivera, RN  Outcome: Progressing  Flowsheets (Taken 6/1/2025 1358)  Free From Fall Injury: Instruct family/caregiver on patient safety  Note: Bed in lowest position, call bell within reach, patient belongings at bedside, non-skid socks on.        Problem: Respiratory - Adult  Goal: Achieves optimal ventilation and oxygenation  Outcome: Progressing  Flowsheets (Taken 6/1/2025 1358)  Achieves optimal ventilation and oxygenation: Assess for changes in respiratory status  Note: Encourage patient to use incentive spirometry. Assess for improving lung function and chest tube drainage.     Problem: Skin/Tissue Integrity - Adult  Goal: Incisions, wounds, or drain sites healing without S/S of infection  Outcome: Progressing  Flowsheets (Taken 6/1/2025 1358)  Incisions, Wounds, or Drain Sites Healing Without Sign and Symptoms of Infection: TWICE DAILY: Assess and document dressing/incision, wound bed, drain sites and surrounding tissue  Note: Monitor chest tube drainage and site. Assess sacral wound, maintain dressing.      
  Problem: Discharge Planning  Goal: Discharge to home or other facility with appropriate resources  6/5/2025 1124 by Micheline Rivera RN  Outcome: Progressing  Flowsheets (Taken 6/5/2025 1124)  Discharge to home or other facility with appropriate resources: Identify barriers to discharge with patient and caregiver  Note: Maintain chest tube, document output, administer antibiotics.      Problem: Safety - Adult  Goal: Free from fall injury  6/5/2025 1124 by Micheline Rivera, RN  Outcome: Progressing  Flowsheets (Taken 6/5/2025 1124)  Free From Fall Injury: Instruct family/caregiver on patient safety  Note: Bed in lowest position, call bell within reach, patient belongings at bedside, non-skid socks on.        Problem: Skin/Tissue Integrity - Adult  Goal: Skin integrity remains intact  Description: 1.  Monitor for areas of redness and/or skin breakdown2.  Assess vascular access sites hourly3.  Every 4-6 hours minimum:  Change oxygen saturation probe site4.  Every 4-6 hours:  If on nasal continuous positive airway pressure, respiratory therapy assess nares and determine need for appliance change or resting period  6/5/2025 1124 by Micheline Rivera, RN  Outcome: Progressing  Flowsheets (Taken 6/5/2025 1124)  Skin Integrity Remains Intact:   Monitor for areas of redness and/or skin breakdown   Turn and reposition as indicated  Note: Remind and encourage patient to turn and reposition every 2 hours. Maintain integrity of sacral wound dressing.      Problem: Musculoskeletal - Adult  Goal: Return mobility to safest level of function  6/5/2025 1124 by Micheline Rivera, RN  Outcome: Progressing  Flowsheets (Taken 6/5/2025 1124)  Return Mobility to Safest Level of Function: Assess patient stability and activity tolerance for standing, transferring and ambulating with or without assistive devices  Note: Encourage and assist patient to the bedside commode as tolerated.      
  Problem: Discharge Planning  Goal: Discharge to home or other facility with appropriate resources  Outcome: Adequate for Discharge  Flowsheets (Taken 6/10/2025 1423)  Discharge to home or other facility with appropriate resources: Identify barriers to discharge with patient and caregiver     Problem: Pain  Goal: Verbalizes/displays adequate comfort level or baseline comfort level  Outcome: Adequate for Discharge  Flowsheets (Taken 6/8/2025 1538 by Micheline Rivera, RN)  Verbalizes/displays adequate comfort level or baseline comfort level: Encourage patient to monitor pain and request assistance     Problem: Safety - Adult  Goal: Free from fall injury  Outcome: Adequate for Discharge  Note:  Problem: Skin/Tissue Integrity - Adult  Goal: Skin integrity remains intact  Description: 1.  Monitor for areas of redness and/or skin breakdown2.  Assess vascular access sites hourly3.  Every 4-6 hours minimum:  Change oxygen saturation probe site4.  Every 4-6 hours:  If on nasal continuous positive airway pressure, respiratory therapy assess nares and determine need for appliance change or resting period  Outcome: Adequate for Discharge  Flowsheets (Taken 6/10/2025 0732)  Skin Integrity Remains Intact: Monitor for areas of redness and/or skin breakdown     Problem: Skin/Tissue Integrity - Adult  Goal: Incisions, wounds, or drain sites healing without S/S of infection  Outcome: Adequate for Discharge  Flowsheets (Taken 6/10/2025 0732)  Incisions, Wounds, or Drain Sites Healing Without Sign and Symptoms of Infection: TWICE DAILY: Assess and document skin integrity     Problem: Musculoskeletal - Adult  Goal: Return mobility to safest level of function  Outcome: Adequate for Discharge  Flowsheets (Taken 6/10/2025 0732)  Return Mobility to Safest Level of Function: Assess patient stability and activity tolerance for standing, transferring and ambulating with or without assistive devices     Problem: Infection - Adult  Goal: 
  Problem: Discharge Planning  Goal: Discharge to home or other facility with appropriate resources  Outcome: Progressing     Problem: Pain  Goal: Verbalizes/displays adequate comfort level or baseline comfort level  Outcome: Progressing     Problem: Safety - Adult  Goal: Free from fall injury  Outcome: Progressing     Problem: Respiratory - Adult  Goal: Achieves optimal ventilation and oxygenation  Outcome: Progressing     Problem: Skin/Tissue Integrity - Adult  Goal: Skin integrity remains intact  Description: 1.  Monitor for areas of redness and/or skin breakdown2.  Assess vascular access sites hourly3.  Every 4-6 hours minimum:  Change oxygen saturation probe site4.  Every 4-6 hours:  If on nasal continuous positive airway pressure, respiratory therapy assess nares and determine need for appliance change or resting period  Outcome: Progressing  Goal: Incisions, wounds, or drain sites healing without S/S of infection  Outcome: Progressing     Problem: Musculoskeletal - Adult  Goal: Return mobility to safest level of function  Outcome: Progressing     Problem: Chronic Conditions and Co-morbidities  Goal: Patient's chronic conditions and co-morbidity symptoms are monitored and maintained or improved  Outcome: Progressing     Problem: Infection - Adult  Goal: Absence of infection at discharge  Outcome: Progressing     Problem: Physical Therapy - Adult  Goal: By Discharge: Performs mobility at highest level of function for planned discharge setting.  See evaluation for individualized goals.  Description: Physical Therapy Goals:  Initiated 5/26/2025, reviewed and continued 6/9/25 to be met within 7-10 days.    1.  Patient will move from supine to sit and sit to supine  in bed with independence.    2.  Patient will transfer from bed to chair and chair to bed with modified independence using the least restrictive device.  3.  Patient will perform sit to stand with modified independence.  4.  Patient will ambulate with 
  Problem: Discharge Planning  Goal: Discharge to home or other facility with appropriate resources  Outcome: Progressing  Flowsheets (Taken 5/27/2025 1109)  Discharge to home or other facility with appropriate resources: Identify barriers to discharge with patient and caregiver  Note: Echo and thoracentesis planned. Chest tube dependent on thoracentesis results.      Problem: Safety - Adult  Goal: Free from fall injury  5/27/2025 1109 by Micheline Rivera, RN  Outcome: Progressing  Flowsheets (Taken 5/27/2025 1109)  Free From Fall Injury: Instruct family/caregiver on patient safety  Note: Bed in lowest position, call bell within reach, patient belongings at bedside, non-skid socks on.        Problem: Respiratory - Adult  Goal: Achieves optimal ventilation and oxygenation  Outcome: Progressing  Flowsheets (Taken 5/27/2025 1109)  Achieves optimal ventilation and oxygenation:   Assess for changes in respiratory status   Position to facilitate oxygenation and minimize respiratory effort  Note: Assess breath sounds and SpO2. Maintain patient on room air. Use incentive spirometry to facilitate lung expansion.     Problem: Skin/Tissue Integrity - Adult  Goal: Skin integrity remains intact  5/27/2025 1109 by Micheline Rivera, RN  Outcome: Progressing  Flowsheets (Taken 5/27/2025 1109)  Skin Integrity Remains Intact: Monitor for areas of redness and/or skin breakdown  Note: Turn and reposition patient every 2 hours.      Problem: Musculoskeletal - Adult  Goal: Return mobility to safest level of function  Flowsheets (Taken 5/27/2025 1109)  Return Mobility to Safest Level of Function: Assess patient stability and activity tolerance for standing, transferring and ambulating with or without assistive devices  Note: Get patient out of bed and into the chair for a short time.     
  Problem: Discharge Planning  Goal: Discharge to home or other facility with appropriate resources  Outcome: Progressing  Flowsheets (Taken 5/30/2025 1112)  Discharge to home or other facility with appropriate resources: Identify barriers to discharge with patient and caregiver  Note: Maintain chest tube.       Problem: Safety - Adult  Goal: Free from fall injury  5/30/2025 1112 by Micheline Rivera RN  Outcome: Progressing  Flowsheets (Taken 5/30/2025 1112)  Free From Fall Injury: Instruct family/caregiver on patient safety  Note: Bed in lowest position, call bell within reach, patient belongings at bedside, non-skid socks on.        Problem: Respiratory - Adult  Goal: Achieves optimal ventilation and oxygenation  Outcome: Progressing  Flowsheets (Taken 5/30/2025 1112)  Achieves optimal ventilation and oxygenation:   Encourage broncho-pulmonary hygiene including cough, deep breathe, incentive spirometry   Position to facilitate oxygenation and minimize respiratory effort   Assess for changes in respiratory status  Note: Encourage incentive spirometry     Problem: Skin/Tissue Integrity - Adult  Goal: Incisions, wounds, or drain sites healing without S/S of infection  Outcome: Progressing  Flowsheets (Taken 5/30/2025 1112)  Incisions, Wounds, or Drain Sites Healing Without Sign and Symptoms of Infection: Implement wound care per orders  Note: Change dressing per wound care orders.     
  Problem: Discharge Planning  Goal: Discharge to home or other facility with appropriate resources  Outcome: Progressing  Flowsheets (Taken 5/31/2025 0310)  Discharge to home or other facility with appropriate resources: Identify barriers to discharge with patient and caregiver  Note: Nurse will help identify barriers to discharge      Problem: Pain  Goal: Verbalizes/displays adequate comfort level or baseline comfort level  Outcome: Progressing  Flowsheets (Taken 5/31/2025 0310)  Verbalizes/displays adequate comfort level or baseline comfort level: Assess pain using appropriate pain scale  Note: Nurse will assess pain using appropriate pain scale      Problem: Safety - Adult  Goal: Free from fall injury  Outcome: Progressing  Flowsheets (Taken 6/1/2025 0312)  Free From Fall Injury: Instruct family/caregiver on patient safety  Note: Nurse will educate patient to call when needed for assistance      Problem: Chronic Conditions and Co-morbidities  Goal: Patient's chronic conditions and co-morbidity symptoms are monitored and maintained or improved  Outcome: Progressing  Flowsheets (Taken 5/30/2025 2008)  Care Plan - Patient's Chronic Conditions and Co-Morbidity Symptoms are Monitored and Maintained or Improved:   Monitor and assess patient's chronic conditions and comorbid symptoms for stability, deterioration, or improvement   Collaborate with multidisciplinary team to address chronic and comorbid conditions and prevent exacerbation or deterioration   Update acute care plan with appropriate goals if chronic or comorbid symptoms are exacerbated and prevent overall improvement and discharge  Note: Nurse will monitor and assess patient's chronic conditions to help prevent exacerbation or deterioration      
  Problem: Discharge Planning  Goal: Discharge to home or other facility with appropriate resources  Outcome: Progressing  Flowsheets (Taken 6/2/2025 0540)  Discharge to home or other facility with appropriate resources: Identify barriers to discharge with patient and caregiver  Note: Discharge pending clinical course and chest tube removal.     Problem: Pain  Goal: Verbalizes/displays adequate comfort level or baseline comfort level  Outcome: Progressing  Flowsheets (Taken 6/2/2025 0540)  Verbalizes/displays adequate comfort level or baseline comfort level:   Encourage patient to monitor pain and request assistance   Administer analgesics based on type and severity of pain and evaluate response   Assess pain using appropriate pain scale   Implement non-pharmacological measures as appropriate and evaluate response  Note: Encourage patient to call for pain medications if needed. Patient with complaints to chest tube site with movement.     Problem: Respiratory - Adult  Goal: Achieves optimal ventilation and oxygenation  Outcome: Progressing  Flowsheets (Taken 6/2/2025 0540)  Achieves optimal ventilation and oxygenation:   Assess for changes in respiratory status   Position to facilitate oxygenation and minimize respiratory effort   Respiratory therapy support as indicated   Assess for changes in mentation and behavior   Oxygen supplementation based on oxygen saturation or arterial blood gases   Encourage broncho-pulmonary hygiene including cough, deep breathe, incentive spirometry   Assess and instruct to report shortness of breath or any respiratory difficulty  Note: Patient currently on room air with chest tube in place to water suction. Monitor chest tube out put. Assess for signs of respiratory distress.      Problem: Skin/Tissue Integrity - Adult  Goal: Incisions, wounds, or drain sites healing without S/S of infection  Outcome: Progressing  Flowsheets (Taken 6/2/2025 0540)  Incisions, Wounds, or Drain Sites 
  Problem: Discharge Planning  Goal: Discharge to home or other facility with appropriate resources  Outcome: Progressing  Flowsheets (Taken 6/4/2025 0800)  Discharge to home or other facility with appropriate resources:   Identify barriers to discharge with patient and caregiver   Arrange for needed discharge resources and transportation as appropriate     Problem: Pain  Goal: Verbalizes/displays adequate comfort level or baseline comfort level  6/4/2025 1225 by Zoila Singh RN  Outcome: Progressing  Flowsheets  Taken 6/4/2025 1152  Verbalizes/displays adequate comfort level or baseline comfort level:   Encourage patient to monitor pain and request assistance   Assess pain using appropriate pain scale  Taken 6/4/2025 0915  Verbalizes/displays adequate comfort level or baseline comfort level:   Encourage patient to monitor pain and request assistance   Assess pain using appropriate pain scale  6/4/2025 0004 by Mikala Bland RN  Outcome: Progressing  Flowsheets (Taken 6/4/2025 0004)  Verbalizes/displays adequate comfort level or baseline comfort level:   Encourage patient to monitor pain and request assistance   Administer analgesics based on type and severity of pain and evaluate response   Assess pain using appropriate pain scale  Note: Pt. Educated on pain medication availability.  Pt. Verbalized understanding. Pt. Tolerating pain with prn pain meds per MAR.      Problem: Safety - Adult  Goal: Free from fall injury  6/4/2025 1225 by Zoila Singh RN  Outcome: Progressing  6/4/2025 0004 by Mikala Bland RN  Outcome: Progressing  Flowsheets (Taken 6/4/2025 0004)  Free From Fall Injury: Based on caregiver fall risk screen, instruct family/caregiver to ask for assistance with transferring infant if caregiver noted to have fall risk factors  Note: Pt. Educated on call bell when in need of help and assistance.  Pt. Verbalized understanding. Bed alarm on.      
  Problem: Discharge Planning  Goal: Discharge to home or other facility with appropriate resources  Outcome: Progressing  Flowsheets (Taken 6/6/2025 1152)  Discharge to home or other facility with appropriate resources: Identify barriers to discharge with patient and caregiver  Note: Change chest tube to water seal and get x-ray in 6 hours.     Problem: Safety - Adult  Goal: Free from fall injury  Outcome: Progressing  Flowsheets (Taken 6/6/2025 1152)  Free From Fall Injury: Instruct family/caregiver on patient safety  Note: Bed in lowest position, call bell within reach, patient belongings at bedside, non-skid socks on.        Problem: Skin/Tissue Integrity - Adult  Goal: Skin integrity remains intact  Description: 1.  Monitor for areas of redness and/or skin breakdown2.  Assess vascular access sites hourly3.  Every 4-6 hours minimum:  Change oxygen saturation probe site4.  Every 4-6 hours:  If on nasal continuous positive airway pressure, respiratory therapy assess nares and determine need for appliance change or resting period  Outcome: Progressing  Flowsheets (Taken 6/6/2025 1152)  Skin Integrity Remains Intact:   Monitor for areas of redness and/or skin breakdown   Turn and reposition as indicated  Note: Encourage patient to turn and reposition every two hours and as needed. Assess sacral wound underneath dressing.      Problem: Infection - Adult  Goal: Absence of infection at discharge  Outcome: Progressing  Flowsheets (Taken 6/6/2025 1152)  Absence of infection at discharge: Administer medications as ordered  Note: Continue antibiotics as ordered.     
  Problem: Nutrition Deficit:  Goal: Optimize nutritional status  Outcome: Progressing  Flowsheets (Taken 5/26/2025 1411)  Nutrient intake appropriate for improving, restoring, or maintaining nutritional needs:   Assess nutritional status and recommend course of action   Monitor oral intake, labs, and treatment plans   Recommend appropriate diets, oral nutritional supplements, and vitamin/mineral supplements     
  Problem: Nutrition Deficit:  Goal: Optimize nutritional status  Outcome: Progressing  Flowsheets (Taken 6/6/2025 1201)  Nutrient intake appropriate for improving, restoring, or maintaining nutritional needs:   Assess nutritional status and recommend course of action   Monitor oral intake, labs, and treatment plans   Recommend appropriate diets, oral nutritional supplements, and vitamin/mineral supplements     
  Problem: Occupational Therapy - Adult  Goal: By Discharge: Performs self-care activities at highest level of function for planned discharge setting.  See evaluation for individualized goals.  Description: Occupational Therapy Goals:  Initiated 5/26/2025 to be met within 7-10 days.    1.  Patient will perform grooming with independence standing at sink >5 minutes.   2.  Patient will perform bathing with independence.  3.  Patient will perform lower body dressing  with independence.  4.  Patient will perform toilet transfers with independence  5.  Patient will perform all aspects of toileting with independence.  6.  Patient will participate in upper extremity therapeutic exercise/activities with independence for 8-10 minutes to increase strength/endurance for ADLs.    7.  Patient will utilize energy conservation techniques during functional activities with verbal and visual cues.    PLOF: Independent. Pt has a neighbor who can assist at times.    Outcome: Progressing   OCCUPATIONAL THERAPY TREATMENT    Patient: Cori Dennis (75 y.o. female)  Date: 6/2/2025  Diagnosis: Pleural effusion, left [J90]  Pleural effusion [J90] Recurrent left pleural effusion      Precautions: Fall Risk, Contact Precautions, General Precautions,  ,  ,  ,  ,  ,  ,      Chart, occupational therapy assessment, plan of care, and goals were reviewed.  ASSESSMENT:  Pt presents in bed with visitor in room, agreeable to OT tx. Pt is S for bed mobility and demo functional mobility from bed to chair with RW and SBA with min increased time required. Pt edu on hand placement during functional xfers with use of RW. Pt is provided with waffle cushion in chair for increased comfort and pt edu on frequent change in position to increase blood flow. Pt demo seated grooming task and LB dressing. See functional levels below. Pt is left in chair with all needs met and call bell within reach.      Progression toward goals:  [x]          Improving 
  Problem: Occupational Therapy - Adult  Goal: By Discharge: Performs self-care activities at highest level of function for planned discharge setting.  See evaluation for individualized goals.  Description: Occupational Therapy Goals:  Initiated 5/26/2025 to be met within 7-10 days.    1.  Patient will perform grooming with independence standing at sink >5 minutes.   2.  Patient will perform bathing with independence.  3.  Patient will perform lower body dressing  with independence.  4.  Patient will perform toilet transfers with independence  5.  Patient will perform all aspects of toileting with independence.  6.  Patient will participate in upper extremity therapeutic exercise/activities with independence for 8-10 minutes to increase strength/endurance for ADLs.    7.  Patient will utilize energy conservation techniques during functional activities with verbal and visual cues.    PLOF: Independent. Pt has a neighbor who can assist at times.    Outcome: Progressing   OCCUPATIONAL THERAPY TREATMENT    Patient: Cori Dennis (75 y.o. female)  Date: 6/3/2025  Diagnosis: Pleural effusion, left [J90]  Pleural effusion [J90] Recurrent left pleural effusion      Precautions: Fall Risk, Contact Precautions, General Precautions    Chart, occupational therapy assessment, plan of care, and goals were reviewed.  ASSESSMENT:  Pt is pleasant and cooperative. Agreeable to EOB activity. Pt now onm RA, remains w/L chest tube. Pt requires increase time and (S) w/bed mobility 2/2 multiple line mgt. PT tolerates standing ~ 2 minutes performing ADL grooming task. Pt requires seated rest break.  w/standing activity. Pt demonstrates energy conservation technique w/LE dressing ADL and Fair plus dynamic standing balance performing dominic-care. Pt returned to supine and left w/all needs within reach. No c/o pain or SOB w/activity.    Progression toward goals:  [x]          Improving appropriately and progressing toward goals  []    
  Problem: Occupational Therapy - Adult  Goal: By Discharge: Performs self-care activities at highest level of function for planned discharge setting.  See evaluation for individualized goals.  Description: Occupational Therapy Goals:  Initiated 5/26/2025 to be met within 7-10 days.    1.  Patient will perform grooming with independence standing at sink >5 minutes.   2.  Patient will perform bathing with independence.  3.  Patient will perform lower body dressing  with independence.  4.  Patient will perform toilet transfers with independence  5.  Patient will perform all aspects of toileting with independence.  6.  Patient will participate in upper extremity therapeutic exercise/activities with independence for 8-10 minutes to increase strength/endurance for ADLs.    7.  Patient will utilize energy conservation techniques during functional activities with verbal and visual cues.    PLOF: Independent. Pt has a neighbor who can assist at times.    Outcome: Progressing   OCCUPATIONAL THERAPY TREATMENT    Patient: Cori Dennis (75 y.o. female)  Date: 6/6/2025  Diagnosis: Pleural effusion, left [J90]  Pleural effusion [J90] Recurrent left pleural effusion      Precautions: Fall Risk, Contact Precautions, General Precautions,  ,  ,  ,  ,  ,  ,      Chart, occupational therapy assessment, plan of care, and goals were reviewed.  ASSESSMENT:  Pt presented supine in bed upon entry, chest tube intact, and agreeable. She was able to transition herself to EOB in prep for functional. STS transfer SBA without AD. Pt maneuvered to reclining chair CGA without AD. She stood to perform dominic area care CGA with RW utilizing 1 UE support for support. Pt positioned for comfort in chair and left with all needs within reach. Vitals WNL. RN made aware.  Progression toward goals:  []          Improving appropriately and progressing toward goals  [x]          Improving slowly and progressing toward goals  []          Not making progress 
  Problem: Occupational Therapy - Adult  Goal: By Discharge: Performs self-care activities at highest level of function for planned discharge setting.  See evaluation for individualized goals.  Description: Occupational Therapy Goals:  Initiated 5/26/2025 to be met within 7-10 days.    1.  Patient will perform grooming with independence standing at sink >5 minutes.   2.  Patient will perform bathing with independence.  3.  Patient will perform lower body dressing  with independence.  4.  Patient will perform toilet transfers with independence  5.  Patient will perform all aspects of toileting with independence.  6.  Patient will participate in upper extremity therapeutic exercise/activities with independence for 8-10 minutes to increase strength/endurance for ADLs.    7.  Patient will utilize energy conservation techniques during functional activities with verbal and visual cues.    PLOF: Independent. Pt has a neighbor who can assist at times.    Outcome: Progressing   OCCUPATIONAL THERAPY TREATMENT    Patient: Cori Dennis (75 y.o. female)  Date: 6/9/2025  Diagnosis: Pleural effusion, left [J90]  Pleural effusion [J90] Recurrent left pleural effusion      Precautions: Fall Risk, Contact Precautions, General Precautions,  ,  ,  ,  ,  ,  ,      Chart, occupational therapy assessment, plan of care, and goals were reviewed.  ASSESSMENT:  Pt presented supine in bed upon entry and agreeable. She came to EOB Supervision in prep for functional tasks. STS transfer SBA without AD and performed walking SPT to reclining chair SBA without AD, simulating toilet transfer. Pt was found to be soiled from urine. She engaged in dominic area hygiene and LB dressing task CGA for clothing mgmt. Provided clean hospital gown donning with SBA @ chair level. Pt left in chair at end of session with all needs within reach. RN made aware.  Progression toward goals:  []          Improving appropriately and progressing toward goals  [x]        
  Problem: Pain  Goal: Verbalizes/displays adequate comfort level or baseline comfort level  5/26/2025 0904 by Dang Hawkins RN  Outcome: Progressing  5/26/2025 0034 by Mikala Bland RN  Outcome: Progressing  Flowsheets (Taken 5/26/2025 0034)  Verbalizes/displays adequate comfort level or baseline comfort level:   Administer analgesics based on type and severity of pain and evaluate response   Encourage patient to monitor pain and request assistance   Implement non-pharmacological measures as appropriate and evaluate response   Assess pain using appropriate pain scale  Note: Pt. Educated on pain medication availability.  Pt. Verbalized understanding.  Pt. Tolerating pain with prn pain meds per MAR.      Problem: Safety - Adult  Goal: Free from fall injury  5/26/2025 0904 by Dang Hawkins RN  Outcome: Progressing  5/26/2025 0034 by Mikala Bland RN  Outcome: Progressing  Flowsheets (Taken 5/26/2025 0034)  Free From Fall Injury: Based on caregiver fall risk screen, instruct family/caregiver to ask for assistance with transferring infant if caregiver noted to have fall risk factors  Note: Pt. Educated on call bell when in need of help and assistance.  Pt. Verbalized understanding. Bed alarm on.      Problem: Respiratory - Adult  Goal: Achieves optimal ventilation and oxygenation  Outcome: Progressing  Flowsheets (Taken 5/26/2025 0803)  Achieves optimal ventilation and oxygenation: Assess for changes in respiratory status  Note: Monitor patient's respiratory status and Spo2%. Collaborate with MD and RT if any changes.      
  Problem: Pain  Goal: Verbalizes/displays adequate comfort level or baseline comfort level  6/3/2025 0141 by Mikala Bland RN  Outcome: Progressing  Flowsheets (Taken 6/3/2025 0141)  Verbalizes/displays adequate comfort level or baseline comfort level:   Administer analgesics based on type and severity of pain and evaluate response   Encourage patient to monitor pain and request assistance  Note: Pt. Educated on pain medication availability.  Pt. Verbalized understanding. Pt. Tolerating pain with prn pain meds per MAR.      Problem: Safety - Adult  Goal: Free from fall injury  6/3/2025 0141 by Mikala Bland RN  Outcome: Progressing  Flowsheets (Taken 6/3/2025 0141)  Free From Fall Injury: Based on caregiver fall risk screen, instruct family/caregiver to ask for assistance with transferring infant if caregiver noted to have fall risk factors  Note: Pt. Educated on call bell when in need of help and assistance.  Pt. Verbalized understanding. Bed alarm on.      Problem: Chronic Conditions and Co-morbidities  Goal: Patient's chronic conditions and co-morbidity symptoms are monitored and maintained or improved  Recent Flowsheet Documentation  Taken 6/3/2025 0815 by Dang Hawkins RN  Care Plan - Patient's Chronic Conditions and Co-Morbidity Symptoms are Monitored and Maintained or Improved:   Monitor and assess patient's chronic conditions and comorbid symptoms for stability, deterioration, or improvement   Collaborate with multidisciplinary team to address chronic and comorbid conditions and prevent exacerbation or deterioration   Update acute care plan with appropriate goals if chronic or comorbid symptoms are exacerbated and prevent overall improvement and discharge  6/3/2025 0141 by Mikala Bland RN  Outcome: Progressing  Flowsheets (Taken 6/3/2025 0141)  Care Plan - Patient's Chronic Conditions and Co-Morbidity Symptoms are Monitored and Maintained or Improved:   Collaborate with multidisciplinary team to address 
  Problem: Pain  Goal: Verbalizes/displays adequate comfort level or baseline comfort level  Outcome: Progressing  Flowsheets (Taken 5/26/2025 0034)  Verbalizes/displays adequate comfort level or baseline comfort level:   Administer analgesics based on type and severity of pain and evaluate response   Encourage patient to monitor pain and request assistance   Implement non-pharmacological measures as appropriate and evaluate response   Assess pain using appropriate pain scale  Note: Pt. Educated on pain medication availability.  Pt. Verbalized understanding.  Pt. Tolerating pain with prn pain meds per MAR.      Problem: Safety - Adult  Goal: Free from fall injury  Outcome: Progressing  Flowsheets (Taken 5/26/2025 0034)  Free From Fall Injury: Based on caregiver fall risk screen, instruct family/caregiver to ask for assistance with transferring infant if caregiver noted to have fall risk factors  Note: Pt. Educated on call bell when in need of help and assistance.  Pt. Verbalized understanding. Bed alarm on.      Problem: Skin/Tissue Integrity - Adult  Goal: Skin integrity remains intact  Outcome: Progressing  Flowsheets (Taken 5/26/2025 0034)  Skin Integrity Remains Intact:   Monitor for areas of redness and/or skin breakdown   Assess vascular access sites hourly   Every 4-6 hours minimum:  Change oxygen saturation probe site  Note: Patient is encourage to turn self frequently.     
  Problem: Pain  Goal: Verbalizes/displays adequate comfort level or baseline comfort level  Outcome: Progressing  Flowsheets (Taken 5/27/2025 0127)  Verbalizes/displays adequate comfort level or baseline comfort level:   Administer analgesics based on type and severity of pain and evaluate response   Notify Licensed Independent Practitioner if interventions unsuccessful or patient reports new pain   Implement non-pharmacological measures as appropriate and evaluate response  Note: Pt. Educated on pain medication availability.  Pt. Verbalized understanding. Pt. Tolerating pain with prn pain meds per MAR.      Problem: Safety - Adult  Goal: Free from fall injury  Outcome: Progressing  Flowsheets (Taken 5/27/2025 0127)  Free From Fall Injury: Based on caregiver fall risk screen, instruct family/caregiver to ask for assistance with transferring infant if caregiver noted to have fall risk factors  Note: Pt. Educated on call bell when in need of help and assistance.  Pt. Verbalized understanding. Bed alarm on.      Problem: Skin/Tissue Integrity - Adult  Goal: Skin integrity remains intact  Flowsheets (Taken 5/27/2025 0127)  Skin Integrity Remains Intact:   Every 4-6 hours:  If on nasal continuous positive airway pressure, assess nares and determine need for appliance change or resting period   Assess vascular access sites hourly   Pressure redistribution bed/mattress (bed type)  Note: Patient wound dressed. Patient is encourage to turn frequently.     
  Problem: Pain  Goal: Verbalizes/displays adequate comfort level or baseline comfort level  Outcome: Progressing  Flowsheets (Taken 5/27/2025 2303)  Verbalizes/displays adequate comfort level or baseline comfort level:   Encourage patient to monitor pain and request assistance   Assess pain using appropriate pain scale   Administer analgesics based on type and severity of pain and evaluate response  Note: Pt. Educated on pain medication availability.  Pt. Verbalized understanding.  Denies pain.      Problem: Safety - Adult  Goal: Free from fall injury  5/27/2025 2303 by Mikala Bland RN  Outcome: Progressing  Flowsheets (Taken 5/27/2025 2303)  Free From Fall Injury: Based on caregiver fall risk screen, instruct family/caregiver to ask for assistance with transferring infant if caregiver noted to have fall risk factors  Note: Pt. Educated on call bell when in need of help and assistance.  Pt. Verbalized understanding. Bed alarm on.      Problem: Respiratory - Adult  Goal: Achieves optimal ventilation and oxygenation  5/27/2025 2303 by Mikala Bland, RN  Outcome: Progressing  Flowsheets (Taken 5/27/2025 2303)  Achieves optimal ventilation and oxygenation:   Position to facilitate oxygenation and minimize respiratory effort   Assess for changes in respiratory status   Oxygen supplementation based on oxygen saturation or arterial blood gases  Note: Collaboration with RT patient is placed on 5L NC of oxygen.     
  Problem: Pain  Goal: Verbalizes/displays adequate comfort level or baseline comfort level  Outcome: Progressing  Flowsheets (Taken 5/29/2025 2356)  Verbalizes/displays adequate comfort level or baseline comfort level:   Encourage patient to monitor pain and request assistance   Administer analgesics based on type and severity of pain and evaluate response   Assess pain using appropriate pain scale   Implement non-pharmacological measures as appropriate and evaluate response  Note: Pt. Educated on pain medication availability.  Pt. Verbalized understanding.  Denies pain.      Problem: Safety - Adult  Goal: Free from fall injury  Outcome: Progressing  Flowsheets (Taken 5/29/2025 2356)  Free From Fall Injury: Based on caregiver fall risk screen, instruct family/caregiver to ask for assistance with transferring infant if caregiver noted to have fall risk factors  Note: Pt. Educated on call bell when in need of help and assistance.  Pt. Verbalized understanding. Bed alarm on.      Problem: Infection - Adult  Goal: Absence of infection at discharge  Outcome: Progressing  Flowsheets (Taken 5/29/2025 2356)  Absence of infection at discharge:   Assess and monitor for signs and symptoms of infection   Monitor all insertion sites i.e., indwelling lines, tubes and drains   Monitor lab/diagnostic results   Monitor endotracheal (as able) and nasal secretions for changes in amount and color  Note: PPE worn before entering the patient room. Hand hygiene performed in and out of the patient room     
  Problem: Pain  Goal: Verbalizes/displays adequate comfort level or baseline comfort level  Outcome: Progressing  Flowsheets (Taken 6/3/2025 0141)  Verbalizes/displays adequate comfort level or baseline comfort level:   Administer analgesics based on type and severity of pain and evaluate response   Encourage patient to monitor pain and request assistance  Note: Pt. Educated on pain medication availability.  Pt. Verbalized understanding. Pt. Tolerating pain with prn pain meds per MAR.      Problem: Safety - Adult  Goal: Free from fall injury  Outcome: Progressing  Flowsheets (Taken 6/3/2025 0141)  Free From Fall Injury: Based on caregiver fall risk screen, instruct family/caregiver to ask for assistance with transferring infant if caregiver noted to have fall risk factors  Note: Pt. Educated on call bell when in need of help and assistance.  Pt. Verbalized understanding. Bed alarm on.      Problem: Chronic Conditions and Co-morbidities  Goal: Patient's chronic conditions and co-morbidity symptoms are monitored and maintained or improved  Outcome: Progressing  Flowsheets (Taken 6/3/2025 0141)  Care Plan - Patient's Chronic Conditions and Co-Morbidity Symptoms are Monitored and Maintained or Improved:   Collaborate with multidisciplinary team to address chronic and comorbid conditions and prevent exacerbation or deterioration   Monitor and assess patient's chronic conditions and comorbid symptoms for stability, deterioration, or improvement  Note: Patient is placed on chest tube, chest tube is free from kinks and is draining well     
  Problem: Pain  Goal: Verbalizes/displays adequate comfort level or baseline comfort level  Outcome: Progressing  Flowsheets (Taken 6/4/2025 0004)  Verbalizes/displays adequate comfort level or baseline comfort level:   Encourage patient to monitor pain and request assistance   Administer analgesics based on type and severity of pain and evaluate response   Assess pain using appropriate pain scale  Note: Pt. Educated on pain medication availability.  Pt. Verbalized understanding. Pt. Tolerating pain with prn pain meds per MAR.      Problem: Safety - Adult  Goal: Free from fall injury  Outcome: Progressing  Flowsheets (Taken 6/4/2025 0004)  Free From Fall Injury: Based on caregiver fall risk screen, instruct family/caregiver to ask for assistance with transferring infant if caregiver noted to have fall risk factors  Note: Pt. Educated on call bell when in need of help and assistance.  Pt. Verbalized understanding. Bed alarm on.      Problem: Skin/Tissue Integrity - Adult  Goal: Skin integrity remains intact  Description: 1.  Monitor for areas of redness and/or skin breakdown2.  Assess vascular access sites hourly3.  Every 4-6 hours minimum:  Change oxygen saturation probe site4.  Every 4-6 hours:  If on nasal continuous positive airway pressure, respiratory therapy assess nares and determine need for appliance change or resting period  Outcome: Progressing  Flowsheets (Taken 6/4/2025 0004)  Skin Integrity Remains Intact:   Monitor for areas of redness and/or skin breakdown   Assess vascular access sites hourly  Note: Skin assessed during this shift change. Stage 2 at the sacrum. Patient turned and position     
  Problem: Pain  Goal: Verbalizes/displays adequate comfort level or baseline comfort level  Outcome: Progressing  Flowsheets (Taken 6/6/2025 2029 by Natalie Chaudhry, RN)  Verbalizes/displays adequate comfort level or baseline comfort level:   Encourage patient to monitor pain and request assistance   Assess pain using appropriate pain scale   Administer analgesics based on type and severity of pain and evaluate response   Implement non-pharmacological measures as appropriate and evaluate response  Note: Turning and repositioning patient every 2 hours as needed and making comfortable with pillows      Problem: Safety - Adult  Goal: Free from fall injury  Outcome: Progressing  Flowsheets (Taken 6/7/2025 1648)  Free From Fall Injury: Instruct family/caregiver on patient safety  Note: Patient bed alarm on and bed at lowest position      Problem: Respiratory - Adult  Goal: Achieves optimal ventilation and oxygenation  Outcome: Progressing  Flowsheets (Taken 6/6/2025 2029 by Natalie Chaudhry RN)  Achieves optimal ventilation and oxygenation:   Assess for changes in respiratory status   Assess for changes in mentation and behavior   Position to facilitate oxygenation and minimize respiratory effort   Oxygen supplementation based on oxygen saturation or arterial blood gases  Note: Monitoring patient respiratory and oxygen saturation during shift     Problem: Skin/Tissue Integrity - Adult  Goal: Incisions, wounds, or drain sites healing without S/S of infection  Outcome: Progressing     Problem: Infection - Adult  Goal: Absence of infection at discharge  Outcome: Progressing  Flowsheets (Taken 6/7/2025 1648)  Absence of infection at discharge: Assess and monitor for signs and symptoms of infection  Note: Monitoring patient labs and electrolytes for s/s of infection     Problem: Nutrition Deficit:  Goal: Optimize nutritional status  Outcome: Progressing  Flowsheets (Taken 6/6/2025 1201 by Yris Telles 
  Problem: Pain  Goal: Verbalizes/displays adequate comfort level or baseline comfort level  Outcome: Progressing  Flowsheets (Taken 6/6/2025 2029)  Verbalizes/displays adequate comfort level or baseline comfort level:   Encourage patient to monitor pain and request assistance   Assess pain using appropriate pain scale   Administer analgesics based on type and severity of pain and evaluate response   Implement non-pharmacological measures as appropriate and evaluate response  Note: Pt. Educated on pain meds per MAR for pain management.     Problem: Discharge Planning  Goal: Discharge to home or other facility with appropriate resources  6/6/2025 1152 by Micheline Rivera, RN  Outcome: Progressing  Flowsheets (Taken 6/6/2025 1152)  Discharge to home or other facility with appropriate resources: Identify barriers to discharge with patient and caregiver  Note: Change chest tube to water seal and get x-ray in 6 hours.     Problem: Safety - Adult  Goal: Free from fall injury  6/6/2025 2029 by Natalie Chaudhry, RN  Outcome: Progressing  Flowsheets (Taken 6/6/2025 2029)  Free From Fall Injury: Instruct family/caregiver on patient safety  Note: Pt. Monitor for skin redness and breakdown.  Pt. Skin assessed and recorded.     Problem: Respiratory - Adult  Goal: Achieves optimal ventilation and oxygenation  Outcome: Progressing  Flowsheets (Taken 6/6/2025 2029)  Achieves optimal ventilation and oxygenation:   Assess for changes in respiratory status   Assess for changes in mentation and behavior   Position to facilitate oxygenation and minimize respiratory effort   Oxygen supplementation based on oxygen saturation or arterial blood gases  Note: Monitor for SOB and discomfort.     
  Problem: Physical Therapy - Adult  Goal: By Discharge: Performs mobility at highest level of function for planned discharge setting.  See evaluation for individualized goals.  Description: Physical Therapy Goals:  Initiated 5/26/2025, reviewed and continued 6/2/25 to be met within 7-10 days.    1.  Patient will move from supine to sit and sit to supine  in bed with independence.    2.  Patient will transfer from bed to chair and chair to bed with modified independence using the least restrictive device.  3.  Patient will perform sit to stand with modified independence.  4.  Patient will ambulate with modified independence for 250 feet with the least restrictive device.   5.  Patient will ascend/descend 5 stairs with (U) handrail(s) with modified independence.    PLOF: Independent. Pt has a neighbor who can assist at times.      Outcome: Progressing   PHYSICAL THERAPY TREATMENT    Patient: Cori Dennis (75 y.o. female)  Date: 6/6/2025  Diagnosis: Pleural effusion, left [J90]  Pleural effusion [J90] Recurrent left pleural effusion      Precautions: Fall Risk, Contact Precautions, General Precautions    ASSESSMENT:  Pt is in bed and agreeable to PT treatment session however requesting not too much out of bed activity as she has been having diarrhea all day.  Pt performed LE exercises as noted below and instructed on performing them several times a day to maintain strength.  She transferred supine to sit with supervision and stood with CGA to sidestep 2 feet towards the head of the bed for better positioning when returning to supine.  Pt was left in bed with needs in reach and nursing notified.  Recommend for next PT session:  Further progression of gait with existing device    Progression toward goals:   []      Improving appropriately and progressing toward goals  [x]      Improving slowly and progressing toward goals  []      Not making progress toward goals and plan of care will be adjusted     PLAN:  Patient 
  Problem: Physical Therapy - Adult  Goal: By Discharge: Performs mobility at highest level of function for planned discharge setting.  See evaluation for individualized goals.  Description: Physical Therapy Goals:  Initiated 5/26/2025, reviewed and continued 6/2/25 to be met within 7-10 days.    1.  Patient will move from supine to sit and sit to supine  in bed with independence.    2.  Patient will transfer from bed to chair and chair to bed with modified independence using the least restrictive device.  3.  Patient will perform sit to stand with modified independence.  4.  Patient will ambulate with modified independence for 250 feet with the least restrictive device.   5.  Patient will ascend/descend 5 stairs with (U) handrail(s) with modified independence.    PLOF: Independent. Pt has a neighbor who can assist at times.      Outcome: Progressing   PHYSICAL THERAPY WEEKLY REASSESSMENT    Patient: Cori Dennis (75 y.o. female)  Date: 6/3/2025  Primary Diagnosis: Pleural effusion, left [J90]  Pleural effusion [J90]       Precautions: Fall Risk, Contact Precautions, General Precautions    ASSESSMENT :  Pt is sitting up in the recliner on arrival and motivated to participate in PT treatment. Pt is performing transfers at a SBA level with increased time.  She performed LE exercises as noted below.  Pt with chest tube to suctions so unable to challenge ambulation distance however patient did march in place several times.  Pt was left sitting up in the recliner with needs in reach and instructions to perform LE exercises several times a day.  Recommend for next PT session:  Further progression of gait with existing device    DEFICITS/IMPAIRMENTS:    ,decreased strength, balance and activity tolerance      Patient will continue to benefit from skilled intervention to address the above impairments and is  progressing toward goals.    Patient's rehabilitation potential/ good.  Factors which may influence 
  Problem: Physical Therapy - Adult  Goal: By Discharge: Performs mobility at highest level of function for planned discharge setting.  See evaluation for individualized goals.  Description: Physical Therapy Goals:  Initiated 5/26/2025, reviewed and continued 6/9/25 to be met within 7-10 days.    1.  Patient will move from supine to sit and sit to supine  in bed with independence.    2.  Patient will transfer from bed to chair and chair to bed with modified independence using the least restrictive device.  3.  Patient will perform sit to stand with modified independence.  4.  Patient will ambulate with modified independence for 250 feet with the least restrictive device.   5.  Patient will ascend/descend 5 stairs with (U) handrail(s) with modified independence.    PLOF: Independent. Pt has a neighbor who can assist at times.      Outcome: Progressing   PHYSICAL THERAPY RE-EVALUATION    Patient: Cori Dennis (75 y.o. female)  Date: 6/9/2025  Primary Diagnosis: Pleural effusion, left [J90]  Pleural effusion [J90]       Precautions: Fall Risk, Contact Precautions, General Precautions,  ,  ,  ,  ,  ,  ,      ASSESSMENT :  Pt received in bed in NAD, agreeable to PT session. Goals reviewed and continue POC. Pt supervision to EOB, good seated balance. 4+/5 BLE for knee extension, hip flexion, and ankle DF/PF. Reports intact sensation. Sit to stand supervision and ambulates to bathroom with RW. Cues to safely use RW. Does use grab bar to help lower onto toilet. Once done voiding, again uses grab bar to come into standing. Good balance while washing hands. Ambulates within room approx 20' and reports fatigue. Returns to EOB and back to supine. Educated pt on activity pacing and gradual progression, verbalized understanding. Continues to demonstrates deficits in strength, mobility, endurance, balance, and safety and continues to pose as a high fall risk. Will continue to benefit from skilled acute care PT during 
  Problem: Safety - Adult  Goal: Free from fall injury  5/29/2025 0809 by Helen Griggs RN  Outcome: Progressing  5/28/2025 1849 by Sri Horne RN  Outcome: Progressing  Flowsheets (Taken 5/28/2025 1849)  Free From Fall Injury:   Instruct family/caregiver on patient safety   Based on caregiver fall risk screen, instruct family/caregiver to ask for assistance with transferring infant if caregiver noted to have fall risk factors     Problem: Respiratory - Adult  Goal: Achieves optimal ventilation and oxygenation  5/29/2025 0809 by Helen Griggs RN  Outcome: Progressing  Flowsheets (Taken 5/29/2025 0809)  Achieves optimal ventilation and oxygenation:   Assess for changes in mentation and behavior   Position to facilitate oxygenation and minimize respiratory effort  5/28/2025 1849 by Sri Horne RN  Outcome: Progressing  Flowsheets (Taken 5/28/2025 0845)  Achieves optimal ventilation and oxygenation: Assess for changes in mentation and behavior     Problem: Pain  Goal: Verbalizes/displays adequate comfort level or baseline comfort level  5/29/2025 0809 by Helen Griggs RN  Outcome: Progressing  Flowsheets (Taken 5/29/2025 0809)  Verbalizes/displays adequate comfort level or baseline comfort level:   Assess pain using appropriate pain scale   Encourage patient to monitor pain and request assistance   Implement non-pharmacological measures as appropriate and evaluate response  5/28/2025 1849 by Sri Horne RN  Outcome: Progressing  Flowsheets (Taken 5/28/2025 1849)  Verbalizes/displays adequate comfort level or baseline comfort level:   Encourage patient to monitor pain and request assistance   Assess pain using appropriate pain scale     
calculate, and assess calorie counts as needed     
conditions to help prevent exacerbation or deterioration     
progress toward goals and plan of care will be adjusted     PLAN:  Patient continues to benefit from skilled intervention to address the above impairments.  Continue treatment per established plan of care.    Further Equipment Recommendations for Discharge: shower chair and AD as determined by PT    AMPA: AM-PAC Inpatient Daily Activity Raw Score: 15    Current research shows that an AM-PAC score of 17 or less is not associated with a discharge to the patient's home setting.    This AMPAC score should be considered in conjunction with interdisciplinary team recommendations to determine the most appropriate discharge setting. Patient's social support, diagnosis, medical stability, and prior level of function should also be taken into consideration.     SUBJECTIVE:   Patient stated, \"Thank you girls for tricking me.\"    OBJECTIVE DATA SUMMARY:   Cognitive/Behavioral Status:  Orientation  Orientation Level: Oriented X4    Functional Mobility and Transfers for ADLs:   Bed Mobility:  Bed Mobility Training  Supine to Sit: Stand by assistance     Transfers:  Transfer Training  Transfer Training: Yes  Sit to Stand: Stand by assistance    Balance:  Balance  Sitting: Intact  Standing: Intact;With support  Standing - Static: Fair (fair plus)  Standing - Dynamic: Fair (fair plus)    ADL Intervention:  Grooming: Stand by assistance  Face/hand hygiene and hair care (brushing) seated EOB    Neuro Re-Education:  Sitting EOB ~ 15 minutes    Pain:  Intensity Pre-treatment: 0/10   Intensity Post-treatment: 0/10    Activity Tolerance:    Fair    After treatment:   []  Patient left in no apparent distress sitting up in chair  [x]  Patient left in no apparent distress seated EOB  [x]  Call bell left within reach  [x]  Nursing notified  []  Caregiver present  [x]  Bed/chair alarm activated    COMMUNICATION/EDUCATION:   Patient Education  Education Given To: Patient  Education Provided: Plan of Care;Energy Conservation  Education Method: 
Back  Barriers to Learning: None  Education Outcome: Verbalized understanding;Demonstrated understanding;Continued education needed      Liliana Justin, PT  Minutes: 26   
Progressing  Flowsheets (Taken 5/25/2025 0500)  Absence of infection at discharge:   Monitor lab/diagnostic results   Assess and monitor for signs and symptoms of infection   Administer medications as ordered     
assistance  Stand to Sit: Minimal assistance    ADL Assessment:   Feeding: Setup  Grooming: Contact guard assistance  UE Bathing: Contact guard assistance  LE Bathing: Minimal assistance  UE Dressing: Contact guard assistance  LE Dressing: Moderate assistance  Toileting: Minimal assistance    Pain:  Intensity Pre-treatment: 0/10   Intensity Post-treatment: 0/10  Scale: Numeric Rating Scale    Activity Tolerance:   Activity Tolerance: Patient Tolerated treatment well  Please refer to the flowsheet for vital signs taken during this treatment.    After treatment:   [x] Patient left in no apparent distress sitting up in chair  [] Patient left in no apparent distress in bed  [x] Call bell left within reach  [x] Nursing notified  [] Caregiver present  [] Bed/chair alarm activated  [x] No alarmAlert and oriented x4    COMMUNICATION/EDUCATION:   Patient Education  Education Given To: Patient  Education Provided: Role of Therapy;Plan of Care;ADL Adaptive Strategies;Transfer Training;Energy Conservation;Mobility Training;Fall Prevention Strategies  Education Method: Teach Back  Barriers to Learning: None  Education Outcome: Verbalized understanding    Thank you for this referral.  Anju Irvin OT  Minutes: 20    Eval Complexity: Decision Making: Low Complexity     
shortened;Left shortened  Gait Abnormalities: Decreased step clearance;Path deviations  Pain:  Intensity Pre-treatment: 0/10   Intensity Post-treatment: 0/10  Scale: Numeric Rating Scale    Activity Tolerance:   Activity Tolerance: Patient tolerated treatment well  Please refer to the flowsheet for vital signs taken during this treatment.    After treatment:   [x]         Patient left in no apparent distress sitting up in chair  []         Patient left in no apparent distress in bed  [x]         Call bell left within reach  [x]         Nursing notified  []         Caregiver present  []         Bed/chair alarm activated  [x]   No alarmNurse approved  []         SCDs applied    COMMUNICATION/EDUCATION:   Patient Education  Education Given To: Patient  Education Provided: Role of Therapy;Plan of Care  Education Method: Demonstration;Verbal;Teach Back  Barriers to Learning: None  Education Outcome: Verbalized understanding;Demonstrated understanding;Continued education needed    Thank you for this referral.  Luz Maria Bazzi, PT  Minutes: 24      Eval Complexity: Decision Making: Low Complexity

## 2025-06-10 NOTE — CARE COORDINATION
06/10/25 1050   IMM Letter   IMM Letter given to Patient/Family/Significant other/Guardian/POA/by: Guera Fischer RN   IMM Letter date given: 06/10/25   IMM Letter time given: 1040     Patient waived her right to a four hour decision to appeal discharge.     Guera Fischer BSN, RN   Case Management   [phone]    
Spoke to patient at bedside to discuss skilled nursing facilities closer to [place], she would like [facility]. Referral placed. Email sent to Alana with admissions. Her second choice would be [facility] [place] and 3rd choice is [facility].     Guera HARDENN, RN   Case Management   [phone]     
Transport scheduled for 14:00 today 6/10 to [facility] ([place] [place][phone][facility]      Samir Quinones RN, BSN, CCM, ACM      
  Transition of Care Consult (CM Consult) Discharge Planning   Services At/After Discharge   (discharge planning)    Resource Information Provided?   (n/a)   Mode of Transport at Discharge Other (see comment)  (family-car)   Confirm Follow Up Transport Self   Condition of Participation: Discharge Planning   The Plan for Transition of Care is related to the following treatment goals: transition of care TBD, pt not medically cleared

## 2025-06-10 NOTE — DISCHARGE INSTRUCTIONS
Discharge Instructions:  Complete Linezolid 600 mg PO q12hrs until 6/21/2025 and Probiotics until 6/24/2025.  Obtain CBCs every 5 days while on Linezolid to monitor for cell line suppression.  Follow-up with your Primary Care Provider (a.k.a. PCP) as scheduled or ~1 week after you are discharged from [facility]---if you have to make an appointment, tell them you need to see them for \"Post-Acute Care.\"      Discharge Recommendations:  If you should experience shortness of breath or dyspnea on exertion worse than normal, inability to catch your breath, wheezing with or without coughing, dizziness, confusion, or loss of consciousness, you should seek medical evaluation.  If you should experience fevers, chills, nausea with vomiting, shortness of breath, faintness, loss of consciousness, and/or confusion, you should seek medical evaluation.  Follow-up with [facility]'s Cardiothoracic Surgeon to evaluate your Video-Assisted Thoracoscopic Surgery (a.k.a. VATS) and remove surgical staples (if still present).

## 2025-06-10 NOTE — PROGRESS NOTES
[facility] Hospitalist Group  Progress Note    Patient: Cori Dennis Age: 75 y.o. : 1950 MR#: [id number] SSN: [id number]  Date: 2025                 DVT Prophylaxis:  [x]Lovenox  []Hep SQ  []SCDs  []Coumadin   []On Heparin gtt []PO anticoagulant    Anticipated discharge: To SNF next week , pending clinical course and chest tube removal      Subjective:     Patient was seen and examined at the bedside.  She complained of loose stools but denied any abdominal pain, nausea or vomiting.  Patient would like the dose of her analgesic regimen to be increased.      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  Neck: No JVD, supple  Lungs: Faint inspiratory rhonchi auscultated over the left lung; chest tube present  CV: Rate and rhythm regular  Abdomen: soft, diffusely tender but nondistended  : no suprapubic tenderness   Extremities: Moves extremities spontaneously  Neuro: Alert and oriented  Skin: Warm and dry  Psych: appropriate mood and affect    Current Facility-Administered Medications   Medication Dose Route Frequency    ALTEplase (CATHFLO) 10 mg in sodium chloride (PF) 0.9 % 30 mL  10 mg IntraPLEUral Q12H    And    dornase alpha (PULMOZYME) 5 mg in sterile water 30 mL  5 mg IntraPLEUral Q12H    ketorolac (TORADOL) injection 15 mg  15 mg IntraVENous Q6H PRN    loperamide (IMODIUM) capsule 2 mg  2 mg Oral 4x Daily PRN    linezolid (ZYVOX) 
  [facility] Hospitalist Group  Progress Note    Patient: Cori Dennis Age: 75 y.o. : 1950 MR#: [id number] SSN: [id number]  Date: 2025                 DVT Prophylaxis:  [x]Lovenox  []Hep SQ  []SCDs  []Coumadin   []On Heparin gtt []PO anticoagulant    Anticipated discharge: To SNF later this week or next week , pending clinical course and chest tube removal      Subjective:     Patient was seen and examined at the bedside in follow-up for left-sided empyema myalgias.  The patient complained of diffuse abdominal discomfort and complained of diarrhea was started yesterday.  She said that she has had 6 loose stools so far.  She denied any nausea or vomiting.      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  Neck: No JVD, supple  Lungs: Faint inspiratory rhonchi auscultated over the left lung; chest tube present  CV: Tachycardic but rhythm regular  Abdomen: soft, diffusely tender but nondistended  : no suprapubic tenderness   Extremities: Moves extremities spontaneously  Neuro: Alert and oriented  Skin: Warm and dry  Psych: appropriate mood and affect    Current Facility-Administered Medications   Medication Dose Route Frequency    loperamide (IMODIUM) capsule 2 mg  2 mg Oral 4x Daily PRN    piperacillin-tazobactam (ZOSYN) 3,375 mg in sodium chloride 0.9 % 50 mL IVPB (addEASE)  3,375 mg IntraVENous Q8H    vancomycin (VANCOCIN) 1,000 
  [facility]  (A Division of [facility])    Follow-up Note      Date of Admission: 5/25/2025       Date of Note:  6/6/2025          Current Antimicrobials:    Prior Antimicrobials:  Zyvox 5/29- present Vanc and cefepime 5/24- 5/25  Vancomycin 5/27 to 5/29  Zosyn 5/20-7 to 5/29       Assessment:  Rec / Plan:   Chronic L pleural effusion /?empyema  - s/p chest tube drainage 3/25 [facility], chest tube removed before discharge 3/30. Given vanc, Piptazo then augmentin, doxycycline. No cx done  - then admitted to [facility] early April w/ CXR mod L pl effusion. Thoracentesis - exudative. S/p 4/22 VATS/minithoracotomy/ partial decortication evac empyema/pleurodesis  - had persistent leak due to \"tear at [facility]\"  for which 5/6 L thoracotomy / oversew tear at [facility] / pleurodesis/ [facility] done by Dr. García.  Cx NG  - chest tube  removed 5/19. discharged to SNF (at [facility]) 5/22.  - hypotension, light-headedness 5/24 so trans to [facility]. CT Chest \"recurrent complex L pleural hydropneumothorax\" but this was compared with 3/27  - unclear if there is truly a recurrence  vs persistence. Could she still be having an air-leak?  Also unclear if she has an active infection but cannot tell from EMR if she got more than 1-2 weeks of antibiotics continuously. Would typically treat empyema 4 weeks or more.  - s/p thoracentesis 5/28: 70 cc fluid removed.   - 5/27 pleural fluid greater than 100,000 WBCs, 97% polys, 2% lymphs, 62,000 RBCs.  Cultures with MRSA  -Chest tube placed 5/28, started on intrapleural lytics on 5/30 - continue Zyvox: Plan to continue for 2 weeks after her chest tube is removed.    - trend CT output. Defer decision for removal to PCC team    - discussed with pulmonary team and Dr. Valdivia     Acute hypoxic respiratory failure -> per [facility]   Comorbidities: Hypothyroidism, HTN, HLD, DJD, ADHD, Anxiety           DO [facility] 
  [facility]  (A Division of [facility])    Follow-up Note      Date of Admission: 5/25/2025       Date of Note:  5/30/2025          Current Antimicrobials:    Prior Antimicrobials:  Zyvox 5/29- present Vanc and cefepime 5/24- 5/25  Vancomycin 5/27 to 5/29  Zosyn 5/20-7 to 5/29       Assessment:  Rec / Plan:   Chronic L pleural effusion /?empyema  - s/p chest tube drainage 3/25 [facility], chest tube removed before discharge 3/30. Given vanc, Piptazo then augmentin, doxycycline. No cx done  - then admitted to [facility] early April w/ CXR mod L pl effusion. Thoracentesis - exudative. S/p 4/22 VATS/minithoracotomy/ partial decortication evac empyema/pleurodesis  - had persistent leak due to \"tear at [facility]\"  for which 5/6 L thoracotomy / oversew tear at [facility] / pleurodesis/ [facility] done by Dr. García.  Cx NG  - chest tube  removed 5/19. discharged to SNF (at [facility]) 5/22.  - hypotension, light-headedness 5/24 so trans to [facility]. CT Chest \"recurrent complex L pleural hydropneumothorax\" but this was compared with 3/27  - unclear if there is truly a recurrence  vs persistence. Could she still be having an air-leak?  Also unclear if she has an active infection but cannot tell from EMR if she got more than 1-2 weeks of antibiotics continuously. Would typically treat empyema 4 weeks or more.  - s/p thoracentesis 5/28: 70 cc fluid removed.   - 5/27 pleural fluid greater than 100,000 WBCs, 97% polys, 2% lymphs, 62,000 RBCs.  Cultures with MRSA - continue Zyvox    -> reviewed throacentesis fluid analysis: Exudative with over 100,000 WBCs.  Cultures currently with few alma  - Ongoing discussions with pulmonary and CT surgery regarding need for repeat VATS decortication    - discussed with Dr. Weiss and      Acute hypoxic respiratory failure -> per PCCM   Comorbidities: Hypothyroidism, HTN, HLD, DJD, ADHD, Anxiety           DO [facility] 
  [facility]  (A Division of [facility])    Follow-up Note      Date of Admission: 5/25/2025       Date of Note:  6/2/2025          Current Antimicrobials:    Prior Antimicrobials:  Zyvox 5/29- present Vanc and cefepime 5/24- 5/25  Vancomycin 5/27 to 5/29  Zosyn 5/20-7 to 5/29       Assessment:  Rec / Plan:   Chronic L pleural effusion /?empyema  - s/p chest tube drainage 3/25 [facility], chest tube removed before discharge 3/30. Given vanc, Piptazo then augmentin, doxycycline. No cx done  - then admitted to [facility] early April w/ CXR mod L pl effusion. Thoracentesis - exudative. S/p 4/22 VATS/minithoracotomy/ partial decortication evac empyema/pleurodesis  - had persistent leak due to \"tear at [facility]\"  for which 5/6 L thoracotomy / oversew tear at [facility] / pleurodesis/ [facility] done by Dr. García.  Cx NG  - chest tube  removed 5/19. discharged to SNF (at [facility]) 5/22.  - hypotension, light-headedness [facility] Chest \"recurrent complex L pleural hydropneumothorax\" but this was compared with 3/27  - unclear if there is truly a recurrence  vs persistence. Could she still be having an air-leak?  Also unclear if she has an active infection but cannot tell from EMR if she got more than 1-2 weeks of antibiotics continuously. Would typically treat empyema 4 weeks or more.  - s/p thoracentesis 5/28: 70 cc fluid removed.   - 5/27 pleural fluid greater than 100,000 WBCs, 97% polys, 2% lymphs, 62,000 RBCs.  Cultures with MRSA  -Chest tube placed 5/28, started on intrapleural lytics on 5/30 - continue Zyvox-duration to be determined by clinical course  - Ongoing discussions with pulmonary and CT surgery regarding need for repeat VATS decortication if lytics fail    - discussed with Dr. Chin and Dr. Valdivia     Acute hypoxic respiratory failure -> per [facility]   Comorbidities: Hypothyroidism, HTN, HLD, DJD, ADHD, Anxiety           Donice Julienne, 
  Physician Progress Note      PATIENT:               JAZ ROBERTS  CSN #:                  [id number]  :                       1950  ADMIT DATE:       2025 12:12 AM  DISCH DATE:  RESPONDING  PROVIDER #:        Maya Valdivia MD          QUERY TEXT:    Protein calorie malnutrition is documented in the medical record Progress   Notes by Maya Valdivia MD at 2025.  Please specify the degree/type:    The clinical indicators include:  This is a 75 yrs Male who presents with increasing shortness of breath    -\" PUD, GASTRIC BYPASS SURGERY, Age 74yrs\"    -\"Protein calorie malnutrition, Continue nutritional supplements\"(Progress   Notes by Maya Valdivia MD at 2025)    -\"Positive for fatigue\"(Consults by Seth Cotton MD at 2025)    -\"Appears frail and poorly nourished\"(Progress Notes by Franck Clark MD at 2025)    -\"chronically ill-appearing, cachectic\"(Progress Notes by Janice Robins DO   at 2025 )    -\"Malnutrition Status:  Severe malnutrition (25 1245)    Context:  Chronic Illness    Findings of the 6 clinical characteristics of malnutrition:    Energy Intake:  75% or less estimated energy requirements for 1 month or   longer    Weight Loss:  Greater than 7.5% over 3 months (-12.9% x2 months)    Body Fat Loss:  Severe body fat loss Buccal region, Triceps, Orbital    Muscle Mass Loss:  Severe muscle mass loss Temples (temporalis), Clavicles   (pectoralis & deltoids), Thigh (quadriceps), Scapula (trapezius)\"(Progress   Notes by Caitlin Chacon RD at 2025)  -BMI 19.14    -Multivitamin 1 tablet,0.9 % sodium chloride infusion  Thank you  Araseli BARTON CDS  Options provided:  -- Severe Malnutrition  -- Other - I will add my own diagnosis  -- Disagree - Not applicable / Not valid  -- Disagree - Clinically unable to determine / Unknown  -- Refer to Clinical Documentation Reviewer    PROVIDER RESPONSE TEXT:    This patient has severe malnutrition.    Query 
  [facility]  (A Division of [facility])    Follow-up Note      Date of Admission: 5/25/2025       Date of Note:  5/26/2025          Current Antimicrobials:    Prior Antimicrobials:  None  Vanc and cefepime 5/24- 5/25       Assessment:  Rec / Plan:   Chronic L pleural effusion /?empyema  - s/p chest tube drainage 3/25 [facility], chest tube removed before discharge 3/30. Given vanc, Piptazo then augmentin, doxycycline. No cx done  - then admitted to [facility] early April w/ CXR mod L pl effusion. Thoracentesis - exudative. S/p 4/22 VATS/minithoracotomy/ partial decortication evac empyema/pleurodesis  - had persistent leak due to \"tear at [facility]\"  for which 5/6 L thoracotomy / oversew tear at [facility] / pleurodesis/ [facility] done by Dr. García.  Cx NG  - chest tube  removed 5/19. discharged to SNF (at [facility]) 5/22.  - hypotension, light-headedness 5/24 so trans to [facility]. CT Chest \"recurrent complex L pleural hydropneumothorax\" but this was compared with 3/27  - unclear if there is truly a recurrence  vs persistence. Could she still be having an air-leak?  Also unclear if she has an active infection but cannot tell from EMR if she got more than 1-2 weeks of antibiotics continuously. Would typically treat empyema 4 weeks or more. -> Follow off antibiotics  -> On Wednesday or Thursday, consider sample pleural fluid again for cell count, culture (sooner if becomes febrile)  -> procalcitonin 0.08 making aggressive bacterial infectious process less likely  - CT surgery recommendations reviewed   Acute hypoxic respiratory failure -> per [facility]   Comorbidities: Hypothyroidism, HTN, HLD, DJD, ADHD, Anxiety           Janice Robins DO  [facility]  [address], [address], [place] [address]  Office: [phone], Ext [phone]       Microbiology:  5/25 blood culture: No growth to date    Subjective:   Patient seen and examined.  Doing about 
  [facility]  (A Division of [facility])    Follow-up Note      Date of Admission: 5/25/2025       Date of Note:  6/10/2025          Current Antimicrobials:    Prior Antimicrobials:  Zyvox 5/29- present Vanc and cefepime 5/24- 5/25  Vancomycin 5/27 to 5/29  Zosyn 5/20-7 to 5/29       Assessment:  Rec / Plan:   Chronic L pleural effusion /?empyema  - s/p chest tube drainage 3/25 [facility], chest tube removed before discharge 3/30. Given vanc, Piptazo then augmentin, doxycycline. No cx done  - then admitted to [facility] early April w/ CXR mod L pl effusion. Thoracentesis - exudative. S/p 4/22 VATS/minithoracotomy/ partial decortication evac empyema/pleurodesis  - had persistent leak due to \"tear at [facility]\"  for which 5/6 L thoracotomy / oversew tear at [facility] / pleurodesis/ [facility] done by Dr. García.  Cx NG  - chest tube  removed 5/19. discharged to SNF (at [facility]) 5/22.  - hypotension, light-headedness 5/24 so trans to [facility]. CT Chest \"recurrent complex L pleural hydropneumothorax\" but this was compared with 3/27  - unclear if there is truly a recurrence  vs persistence. Could she still be having an air-leak?  Also unclear if she has an active infection but cannot tell from EMR if she got more than 1-2 weeks of antibiotics continuously. Would typically treat empyema 4 weeks or more.  - s/p thoracentesis 5/28: 70 cc fluid removed.   - 5/27 pleural fluid greater than 100,000 WBCs, 97% polys, 2% lymphs, 62,000 RBCs.  Cultures with MRSA  -Chest tube placed 5/28, started on intrapleural lytics on 5/30 - continue Zyvox: Stop date 6/21.  Follow CBC every 3 to 5 days to monitor for any medication associated toxicities    - 6/8 and 6/9 chest x-rays reviewed- no significant changes    - discussed with Dr. Crabtree and Dr. Alston     Acute hypoxic respiratory failure -> per [facility]   Comorbidities: Hypothyroidism, HTN, HLD, DJD, ADHD, Anxiety           Janice Robins, 
  [facility]  (A Division of [facility])    Follow-up Note      Date of Admission: 5/25/2025       Date of Note:  6/5/2025          Current Antimicrobials:    Prior Antimicrobials:  Zyvox 5/29- present Vanc and cefepime 5/24- 5/25  Vancomycin 5/27 to 5/29  Zosyn 5/20-7 to 5/29       Assessment:  Rec / Plan:   Chronic L pleural effusion /?empyema  - s/p chest tube drainage 3/25 [facility], chest tube removed before discharge 3/30. Given vanc, Piptazo then augmentin, doxycycline. No cx done  - then admitted to [facility] early April w/ CXR mod L pl effusion. Thoracentesis - exudative. S/p 4/22 VATS/minithoracotomy/ partial decortication evac empyema/pleurodesis  - had persistent leak due to \"tear at [facility]\"  for which 5/6 L thoracotomy / oversew tear at [facility] / pleurodesis/ [facility] done by Dr. García.  Cx NG  - chest tube  removed 5/19. discharged to SNF (at [facility]) 5/22.  - hypotension, light-headedness 5/24 so trans to [facility]. CT Chest \"recurrent complex L pleural hydropneumothorax\" but this was compared with 3/27  - unclear if there is truly a recurrence  vs persistence. Could she still be having an air-leak?  Also unclear if she has an active infection but cannot tell from EMR if she got more than 1-2 weeks of antibiotics continuously. Would typically treat empyema 4 weeks or more.  - s/p thoracentesis 5/28: 70 cc fluid removed.   - 5/27 pleural fluid greater than 100,000 WBCs, 97% polys, 2% lymphs, 62,000 RBCs.  Cultures with MRSA  -Chest tube placed 5/28, started on intrapleural lytics on 5/30 - continue Zyvox: Plan to continue for 2 weeks after her chest tube is removed.    - Reviewed repeat CT chest     - discussed with pulmonary team and Dr. Valdivia     Acute hypoxic respiratory failure -> per [facility]   Comorbidities: Hypothyroidism, HTN, HLD, DJD, ADHD, Anxiety           Janice Rboins DO  [facility]  [address] 
0710: Bedside shift change report given to YAMILA Leong (oncoming nurse) by YAMILA George (offgoing nurse). Report included the following information Nurse Handoff Report, Adult Overview, Intake/Output, MAR, and Cardiac Rhythm NSR/ST .     0710; 4 Eyes Skin Assessment     NAME:  Cori Dennis  YOB: 1950  MEDICAL RECORD NUMBER:  [id number]    The patient is being assessed for  Shift Handoff    I agree that at least one RN has performed a thorough Head to Toe Skin Assessment on the patient. ALL assessment sites listed below have been assessed.      Areas assessed by both nurses:    Head, Face, Ears, Shoulders, Back, Chest, Arms, Elbows, Hands, Sacrum. Buttock, Coccyx, Ischium, Legs. Feet and Heels, and Under Medical Devices         Does the Patient have a Wound? Yes wound(s) were present on assessment. LDA wound assessment was Initiated and completed by RN       Willis Prevention initiated by RN: Yes  Wound Care Orders initiated by RN: Yes    Pressure Injury (Stage 3,4, Unstageable, DTI, NWPT, and Complex wounds) if present, place Wound referral order by RN under : Yes Stage 2 to sacrum    New Ostomies, if present place, Ostomy referral order under : No     Nurse 1 eSignature: Electronically signed by Ross Leong RN on 5/25/25 at 7:14 AM EDT    **SHARE this note so that the co-signing nurse can place an eSignature**    Nurse 2 eSignature: Electronically signed by YAMILA George on 5/25/25 at 7:15 AM EDT    
0740: Bedside shift change report given to YAMILA Leong (oncoming nurse) by YAMILA Bach (offgoing nurse). Report included the following information Nurse Handoff Report, Adult Overview, Intake/Output, MAR, and Cardiac Rhythm NSR .     0740: 4 Eyes Skin Assessment     NAME:  Cori Dennis  YOB: 1950  MEDICAL RECORD NUMBER:  [id number]    The patient is being assessed for  Shift Handoff    I agree that at least one RN has performed a thorough Head to Toe Skin Assessment on the patient. ALL assessment sites listed below have been assessed.      Areas assessed by both nurses:    Head, Face, Ears, Shoulders, Back, Chest, Arms, Elbows, Hands, Sacrum. Buttock, Coccyx, Ischium, Legs. Feet and Heels, and Under Medical Devices  stage 2 to sacrum (small tear in gluteal fold). See media. Redness also.        Does the Patient have a Wound? Yes wound(s) were present on assessment. LDA wound assessment was Initiated and completed by RN       Willis Prevention initiated by RN: Yes  Wound Care Orders initiated by RN: Yes    Pressure Injury (Stage 3,4, Unstageable, DTI, NWPT, and Complex wounds) if present, place Wound referral order by RN under : No    New Ostomies, if present place, Ostomy referral order under : No     Nurse 1 eSignature: Electronically signed by Ross Leong RN on 5/26/25 at   8:02 AM EDT    **SHARE this note so that the co-signing nurse can place an eSignature**    Nurse 2 eSignature: Electronically signed by YAMILA Bach on 5/26/25 at 8:02 AM EDT    
0800: Bedside shift change report given to YAMILA Leong (oncoming nurse) by YAMILA Bach (offgoing nurse). Report included the following information Nurse Handoff Report, Adult Overview, Intake/Output, MAR, and Cardiac Rhythm NSR .     1915: Bedside shift change report given to YAMILA Bach (oncoming nurse) by YAMILA Leong (offgoing nurse). Report included the following information Intake/Output, MAR, and Cardiac Rhythm NSR .       
1940 Bedside shift report received from Micheline GONZALEZ RN. Chest tube assessed. Chest tube connected to suction. Visible bellow at 20 cm. Occasional minimal bubbling noted during inhalation. Chest tube dressing remains clean, dry and intact. Patient denies pain at this time only with movement.     1945 Dornase and alteplase instilled in chest tube by GEOVANNA Sanchez.. Patient to be repositioned every 15 minutes x 4. Will unclamp chest tube at 2045.     2045 Chest tube unclamped. Output after chest tube is unclamped is 50 ml total in atrium is 800 ml       
2239 Transfer in report received from Gayla Nuno RN at [facility] on the skilled nursing unit     0015 Patient arrived to unit via stretcher. Patient alert and oriented. Patient currently on 3 L nasal cannula. Patient ambulatory from the stretcher to bed. No respiratory distressed observed at time time. Occasional shortness of breath at rest but increases with activity per patient. Patient oriented to room, call bell within reach. Chart reviewed with patient. Patient updated on plan of care.   
4 Eyes Skin Assessment     NAME:  Cori Dennis  YOB: 1950  MEDICAL RECORD NUMBER:  [id number]    The patient is being assessed for  Transfer to New Unit    I agree that at least one RN has performed a thorough Head to Toe Skin Assessment on the patient. ALL assessment sites listed below have been assessed.      Areas assessed by both nurses:    Head, Face, Ears, Shoulders, Back, Chest, Arms, Elbows, Hands, Sacrum. Buttock, Coccyx, Ischium, Legs. Feet and Heels, and Under Medical Devices         Does the Patient have a Wound? Yes wound(s) were present on assessment. LDA wound assessment was Initiated and completed by RN       Willis Prevention initiated by RN: Yes  Wound Care Orders initiated by RN: Yes    Pressure Injury (Stage 3,4, Unstageable, DTI, NWPT, and Complex wounds) if present, place Wound referral order by RN under : No    New Ostomies, if present place, Ostomy referral order under : No     Nurse 1 eSignature: Electronically signed by JCARLOS CHAVIRA RN on 5/29/25 at 8:37 PM EDT    **SHARE this note so that the co-signing nurse can place an eSignature**    Nurse 2 eSignature: {Esignature:[id number]}   
ARU/IPR REFERRAL CONTACT NOTE  [facility]      Thank you for the opportunity to review this patient's case for admission to [facility].    Referral received: 6/6/2025 time:4:04pm    Based on our pre-admission screening:                          [x ] Our Team/Medical Director is following this case.   Comments: referral received Will review with team.         Again, Thank you for this referral. Should you have any questions please do not hesitate to call.     Sincerely,  AFTAB Patel, CTRS      Clinical Liaison  [facility]  [phone]         
ARU/IPR REFERRAL CONTACT NOTE  [facility]        Thank you for the opportunity to review this patient's case for admission to [facility].        [ X ] This patient does not meet criteria for admission to [facility] due to:     [ X ] Other: Spoke with the patient about ARU. Patient politely declined to come to our facility. She reports that she and her family live in [place], [place] and she would like to go to a rehab facility that is closer to her home.     This information was shared with the patient's CM as requested.     Will sign off at this time due to patient preference.            Again, Thank you for this referral. Should you have any questions please do not hesitate to call.      Sincerely,  Yu Jha RN     Clinical Liaison  [facility]  [phone]  
Advance Care Planning   Healthcare Decision Maker:    Primary Decision Maker: Antonia Givens - Other - [phone]    Secondary Decision Maker: Sarah Beck - Friend - [phone]    Click here to complete Healthcare Decision Makers including selection of the Healthcare Decision Maker Relationship (ie \"Primary\").    Spiritual Health History and Assessment/Progress Note  [facility]    Spiritual/Emotional Needs,  ,  ,      Name: Cori Dennis MRN: [id number]    Age: 75 y.o.     Sex: female   Language: English   Confucianism: Other   Recurrent left pleural effusion     Date: 5/27/2025            Total Time Calculated: 8 min              Spiritual Assessment began in [facility]        Referral/Consult From: Rounding, Friend   Encounter Overview/Reason: Spiritual/Emotional Needs  Service Provided For: Patient    Nemo, Belief, Meaning:   Patient has beliefs or practices that help with coping during difficult times  Family/Friends No family/friends present      Importance and Influence:  Patient has spiritual/personal beliefs that influence decisions regarding their health  Family/Friends No family/friends present    Community:  Patient feels well-supported. Support system includes: Friends  Family/Friends No family/friends present    Assessment and Plan of Care:     Patient Interventions include: Facilitated expression of thoughts and feelings, Provided sacramental/Christian ritual, and Assisted in Advance Care Planning conversation  Family/Friends Interventions include: No family/friends present    Patient Plan of Care: Spiritual Care available upon further referral  Family/Friends Plan of Care: No family/friends present    Electronically signed by Chaplain Nahomy on 5/27/2025 at 11:39 AM          
At 0545: TPA given through patient Chest tube by PA Pedlowska at 0545, tube clamp by PA. Patient was turned and position every 15mins 4TIMES by this RN per instruction from PA. Chest tube needs to be unlcamp at 0645 which was done by this RN. Patient tolerated well.   
Bedside and Verbal shift change report given to YAMILA Ortiz (oncoming nurse) by YAMILA Schaeffer (offgoing nurse). Report included the following information Nurse Handoff Report and Adult Overview.    
Bedside and Verbal shift change report given to YAMILA Schaeffer (oncoming nurse) by YAMILA Estrada (offgoing nurse). Report included the following information Nurse Handoff Report and Adult Overview.      4 Eyes Skin Assessment     NAME:  Cori Dennis  YOB: 1950  MEDICAL RECORD NUMBER:  [id number]    The patient is being assessed for  Shift Handoff    I agree that at least one RN has performed a thorough Head to Toe Skin Assessment on the patient. ALL assessment sites listed below have been assessed.      Areas assessed by both nurses:    Head, Face, Ears, Shoulders, Back, Chest, Arms, Elbows, Hands, Sacrum. Buttock, Coccyx, Ischium, Legs. Feet and Heels, and Under Medical Devices         Does the Patient have a Wound? Yes wound(s) were present on assessment. LDA wound assessment was Initiated and completed by RN       Willis Prevention initiated by RN: Yes  Wound Care Orders initiated by RN: No    Pressure Injury (Stage 3,4, Unstageable, DTI, NWPT, and Complex wounds) if present, place Wound referral order by RN under : No    New Ostomies, if present place, Ostomy referral order under : No     Nurse 1 eSignature: Electronically signed by GAGANDEEP CHAN RN on 6/7/25 at 7:25 AM EDT    **SHARE this note so that the co-signing nurse can place an eSignature**    Nurse 2 eSignature: Electronically signed by YAMILA ESTRADA on 6/7/25 at 7:26 AM EDT   
Bedside and Verbal shift change report given to YAMILA Schaeffer (oncoming nurse) by YAMILA Ortiz (offgoing nurse). Report included the following information Nurse Handoff Report and Adult Overview.        Bedside and Verbal shift change report given to YAMILA Tobias (oncoming nurse) by YAMILA Schaeffer (offgoing nurse). Report included the following information Nurse Handoff Report and Adult Overview.    
Bedside and Verbal shift change report given to YAMILA Tobias (oncoming nurse) by YAMILA Schaeffer (offgoing nurse). Report included the following information Adult Overview, MAR, and Recent Results.     
[facility]      Follow up for Persistent air leak    Chief Complaint:  Continued air leak S/p thoracoscopy and thoracotomy in April. Pt sxs started in Feb 2025    Interval History:  Pt has continued air leak from CT.  New CT on 5/28, with round of lytics. CT still putting out 350/day      Exam:   /68   Pulse (!) 101   Temp 98.2 °F (36.8 °C) (Oral)   Resp 16   Ht 1.651 m (5' 5\")   Wt 52.2 kg (115 lb)   SpO2 98%   BMI 19.14 kg/m²      Const: alert and oriented.  NAD, cachectic    CV:  RRR without murmur or rub.  PMI not displaced.  Noperipheral edema   Respiratory: L diminished without wheezes, rales or rhonchi.  No accessory muscle use.     Assessment:  Persistent CT expiratory, small air leak and Loculated L lung with airspace continuing to fill with and drain fluid.     PLAN:  Pt has had 2 recent thoracic surgeries. She will have copious scar tissue and additional surgery will cause more harm than good. She has poor tissue and is malnourished further decreasing her chances of a good recovery Post OP. Also, she has no indication of empyema. She is not a surgical candidate at this time.      
Comprehensive Nutrition Assessment    Type and Reason for Visit:  Initial, Wound    Nutrition Recommendations/Plan:   Will order Ensure Plus HP with meals (350 kcal 20 g protein)  Encourage PO intake of meals and supplements  NFPE at follow up due to severe weight loss over 3 months     Malnutrition Assessment:  Malnutrition Status:  Insufficient data (05/26/25 9729)    Context:  Chronic Illness     Findings of the 6 clinical characteristics of malnutrition:  Energy Intake:  Unable to assess  Weight Loss:  Greater than 7.5% over 3 months     Body Fat Loss:  Unable to assess     Muscle Mass Loss:  Unable to assess    Fluid Accumulation:  No fluid accumulation     Strength:  Not Performed    Nutrition Assessment:    Pt admitted with recurrent L pleural effusion concern for recurrent empyema. Assessment secondary to unstageable pressure ulcer to sacrum. Pt was living in SNF prior to admission. Diet is Regular    Nutrition Related Findings:    no edema noted; LBM 5/24 Wound Type: Pressure Injury, Unstageable       Current Nutrition Intake & Therapies:    Average Meal Intake: Unable to assess  Average Supplements Intake: None Ordered  ADULT DIET; Regular    Anthropometric Measures:  Height: 165.1 cm (5' 5\")  Ideal Body Weight (IBW): 125 lbs (57 kg)    Admission Body Weight: 52.3 kg (115 lb 4.8 oz)  Current Body Weight: 52.3 kg (115 lb 4.8 oz), 92.2 % IBW. Weight Source: Bed scale  Current BMI (kg/m2): 19.2  Usual Body Weight: 59.9 kg (132 lb)     % Weight Change (Calculated): -12.7  Weight Adjustment For: No Adjustment                 BMI Categories: Underweight (BMI less than 22) age over 65    Estimated Daily Nutrient Needs:  Energy Requirements Based On: Kcal/kg  Weight Used for Energy Requirements: Current  Energy (kcal/day): 9814-9868 kcal (30-35 kcal/kg)  Weight Used for Protein Requirements: Current  Protein (g/day): 63-78 g (1.2-1.5 g/kg)  Method Used for Fluid Requirements: 1 ml/kcal  Fluid (ml/day): 
[facility] Cl[facility] Hospitalist Group  Progress Note    Patient: Cori Dennis Age: 75 y.o. : 1950 MR#: [id number] SSN: [id number]  Date/Time: 2025     C/C: Shortness of breath/respiratory failure        HPI : 75-year-old female past history of peptic ulcer disease Raynaud's disease hyperlipidemia hypertension hypothyroidism and osteoarthritis, admitted through ED at [facility] with a history of increasing shortness of breath.  Recently patient was treated at [facility] for empyema and discharged with antibiotics.      At [facility]-CT/chest showed patient has persistent empyema, chest tube was placed    Subjective:  Patient is alert awake oriented  Comfortable  Chest tube still present      -patient is alert and awake, sitting up in recliner, no acute distress.  Chest tube removed by pulmonology.           Assessment/Plan:     1.  Acute hypoxic respiratory failure  2 empyema left hydropneumothorax-MRSA positive pleural fluid culture  3 intermittent atrial fibrillation with RVR  4 hypertension  5 peptic ulcer disease  6 history of Raynaud's disease  7 hypothyroidism  8 anemia of chronic disease      PLAN:      Chest tube removed.  Continue to monitor, pulmonology on board.  Continue antibiotics per ID recommendations.  Continue chronic medications, labs reviewed, will follow.        2025  -Patient with recurrent pleural effusion on left side  - As mentioned previously had discussion with CT surgery-no surgery planned, they advise removal of chest tube-defer this to pulmonary.  No further intervention for this pleural effusion as it has now become chronic reexpansion of lung extremely unlikely.  I discussed this with the patient and she understood  - Now we wait for chest tube removal, deciding on antibiotic, and probably discharge home or placement.          2025  - Patient s/p and instillation of biotics intrapleurally  - Draining well  - Discussed with CT surgery: 
[facility]quan Claudio [facility] Hospitalist Group  Progress Note    Patient: Cori Dennis Age: 75 y.o. : 1950 MR#: [id number] SSN: [id number]  Date/Time: 2025     C/C: Shortness of breath/respiratory failure        HPI : 75-year-old female past history of peptic ulcer disease Raynaud's disease hyperlipidemia hypertension hypothyroidism and osteoarthritis, admitted through ED at [facility] with a history of increasing shortness of breath.  Recently patient was treated at [facility] for empyema and discharged with antibiotics.      At [facility]-CT/chest showed patient has persistent empyema, chest tube was placed    Subjective:  Patient is alert awake oriented  Comfortable  Chest tube still present       Review of Systems:  positive responses in bold type   Constitutional: Negative for fever, chills, diaphoresis and unexpected weight change.   HENT: Negative for ear pain, congestion, sore throat, rhinorrhea, drooling, trouble swallowing, neck pain and tinnitus.   Eyes: Negative for photophobia, pain, redness and visual disturbance.   Respiratory: negative for shortness of breath, cough, choking, chest tightness, wheezing or stridor.   Cardiovascular: Negative for chest pain, palpitations and leg swelling.   Gastrointestinal: Negative for nausea, vomiting, abdominal pain, diarrhea, constipation, blood in stool, abdominal distention and anal bleeding.   Genitourinary: Negative for dysuria, urgency, frequency, hematuria, flank pain and difficulty urinating.   Musculoskeletal: Negative for back pain and arthralgias.   Skin: Negative for color change, rash and wound.   Neurological: Negative for dizziness, seizures, syncope, speech difficulty, light-headedness or headaches.   Hematological: Does not bruise/bleed easily.   Psychiatric/Behavioral: Negative for suicidal ideas, hallucinations, behavioral problems, self-injury or agitation     Assessment/Plan:     1.  Acute hypoxic respiratory failure  2 
Daquan Claudio [facility] Hospitalist Group  Progress Note    Patient: Cori Dennis Age: 75 y.o. : 1950 MR#: [id number] SSN: [id number]  Date/Time: 2025     C/C: Shortness of breath/respiratory failure        HPI : 75-year-old female past history of peptic ulcer disease Raynaud's disease hyperlipidemia hypertension hypothyroidism and osteoarthritis, admitted through ED at [facility] with a history of increasing shortness of breath.  Recently patient was treated at [facility] for empyema and discharged with antibiotics.      At [facility]-CT/chest showed patient has persistent empyema, chest tube was placed    Subjective:  Patient lying in bed  Denies any chest pain  No shortness of  Breath   no nausea vomiting diarrhea       Review of Systems:  positive responses in bold type   Constitutional: Negative for fever, chills, diaphoresis and unexpected weight change.   HENT: Negative for ear pain, congestion, sore throat, rhinorrhea, drooling, trouble swallowing, neck pain and tinnitus.   Eyes: Negative for photophobia, pain, redness and visual disturbance.   Respiratory: negative for shortness of breath, cough, choking, chest tightness, wheezing or stridor.   Cardiovascular: Negative for chest pain, palpitations and leg swelling.   Gastrointestinal: Negative for nausea, vomiting, abdominal pain, diarrhea, constipation, blood in stool, abdominal distention and anal bleeding.   Genitourinary: Negative for dysuria, urgency, frequency, hematuria, flank pain and difficulty urinating.   Musculoskeletal: Negative for back pain and arthralgias.   Skin: Negative for color change, rash and wound.   Neurological: Negative for dizziness, seizures, syncope, speech difficulty, light-headedness or headaches.   Hematological: Does not bruise/bleed easily.   Psychiatric/Behavioral: Negative for suicidal ideas, hallucinations, behavioral problems, self-injury or agitation     Assessment/Plan:     1.  Acute 
Daquan Claudio [facility] Hospitalist Group  Progress Note    Patient: Cori Dennis Age: 75 y.o. : 1950 MR#: [id number] SSN: [id number]  Date/Time: 2025     C/C: Shortness of breath/respiratory failure        HPI : 75-year-old female past history of peptic ulcer disease Raynaud's disease hyperlipidemia hypertension hypothyroidism and osteoarthritis, admitted through ED at [facility] with a history of increasing shortness of breath.  Recently patient was treated at [facility] for empyema and discharged with antibiotics.      At [facility]-CT/chest showed patient has persistent empyema, chest tube was placed    Subjective:    Patient is very comfortable sitting up in chair  No chest pain no burning no fever no chills         Review of Systems:  positive responses in bold type   Constitutional: Negative for fever, chills, diaphoresis and unexpected weight change.   HENT: Negative for ear pain, congestion, sore throat, rhinorrhea, drooling, trouble swallowing, neck pain and tinnitus.   Eyes: Negative for photophobia, pain, redness and visual disturbance.   Respiratory: negative for shortness of breath, cough, choking, chest tightness, wheezing or stridor.   Cardiovascular: Negative for chest pain, palpitations and leg swelling.   Gastrointestinal: Negative for nausea, vomiting, abdominal pain, diarrhea, constipation, blood in stool, abdominal distention and anal bleeding.   Genitourinary: Negative for dysuria, urgency, frequency, hematuria, flank pain and difficulty urinating.   Musculoskeletal: Negative for back pain and arthralgias.   Skin: Negative for color change, rash and wound.   Neurological: Negative for dizziness, seizures, syncope, speech difficulty, light-headedness or headaches.   Hematological: Does not bruise/bleed easily.   Psychiatric/Behavioral: Negative for suicidal ideas, hallucinations, behavioral problems, self-injury or agitation     Assessment/Plan:     1.  Acute hypoxic 
[facility]   Pharmacy Pharmacokinetic Monitoring Service - Vancomycin    Indication:  Empyema  Target Concentration: Goal AUC/HUGH 400-600 mg*hr/L  Day of Therapy: 1  Additional Antimicrobials: Piperacillin/Tazobactam    Pertinent Laboratory Values:   Wt Readings from Last 1 Encounters:   05/27/25 52.2 kg (115 lb)     Temp Readings from Last 1 Encounters:   05/28/25 98.3 °F (36.8 °C) (Oral)     Estimated Creatinine Clearance: 88 mL/min (A) (based on SCr of 0.45 mg/dL (L)).  Recent Labs     05/25/25  1520 05/26/25  0150 05/27/25  0304 05/28/25  0301   CREATININE  --    < > 0.42* 0.45*   BUN  --    < > 10 9   WBC  --    < > 10.8 11.1   PROCAL 0.08  --   --   --     < > = values in this interval not displayed.       Pertinent Cultures:  Culture Date Source Results   5/25 blood NGTD   5/27 Body fluid In process   MRSA Nasal Swab: N/A. Non-respiratory infection.    Assessment:  Date Current Dose Concentration Timing of Concentration (h) AUC   5/27 1250 mg x 1 - - -   5/28 1000 mg q12h 12.2 6 527   Note: Serum concentrations collected for AUC dosing may appear elevated if collected in close proximity to the dose administered, this is not necessarily an indication of toxicity    Plan:  Continue current dose of 1000 mg q12h  No level ordered at this time  Renal labs as indicated  Pharmacy will continue to monitor patient and adjust therapy as indicated    Thank you for the consult,  Coral Miranda RPH  5/28/2025 8:22 AM   
[facility]   Pharmacy Pharmacokinetic Monitoring Service - Vancomycin    Indication: empyema  Goal AUC/HUGH: 400-600  Day of Therapy: 1  Additional Antimicrobials: cefepime    Pertinent Laboratory Values:   Wt Readings from Last 1 Encounters:   03/30/25 59.9 kg (132 lb)     Temp Readings from Last 1 Encounters:   05/25/25 99 °F (37.2 °C) (Oral)     CrCl cannot be calculated (Unknown ideal weight.).    Recent Labs     05/24/25  1020   CREATININE 0.61   BUN 13   WBC 13.7*     Pertinent Cultures:  Date Source Results   - - -   MRSA Nasal Swab: present 3/26    Assessment:  Date Current Dose Level (mg/L) Timing of Level (h) AUC/HUGH   5/25 1,500 mg x1  750 mg q12h - - -   Note: Serum concentrations collected for AUC dosing may appear elevated if collected in close proximity to the dose administered, this is not necessarily an indication of toxicity    Plan:  Administer 1,500 mg x1 then start a dose of 750 mg q12h  Ordered a level for 5/26 with AM labs  Pharmacy will continue to monitor patient and adjust therapy as indicated    Thank you for the consult,  Julito Griffiths RPH  5/25/2025  
[facility]   Pharmacy Pharmacokinetic Monitoring Service - Vancomycin    Indication:  Empyema  Target Concentration: Goal AUC/HUGH 400-600 mg*hr/L  Day of Therapy: 1  Additional Antimicrobials: Piperacillin/Tazobactam    Pertinent Laboratory Values:   Wt Readings from Last 1 Encounters:   05/27/25 52.2 kg (115 lb)     Temp Readings from Last 1 Encounters:   05/27/25 99.8 °F (37.7 °C) (Oral)     Estimated Creatinine Clearance: 94 mL/min (A) (based on SCr of 0.42 mg/dL (L)).  Recent Labs     05/25/25  1520 05/26/25  0150 05/27/25  0304   CREATININE  --  0.44* 0.42*   BUN  --  11 10   WBC  --  12.0 10.8   PROCAL 0.08  --   --        Pertinent Cultures:  Culture Date Source Results   5/25 blood NGTD   5/27 Body fluid In process   MRSA Nasal Swab: N/A. Non-respiratory infection.    Assessment:  Date Current Dose Concentration Timing of Concentration (h) AUC   5/27 1250 mg x 1 - - -   Note: Serum concentrations collected for AUC dosing may appear elevated if collected in close proximity to the dose administered, this is not necessarily an indication of toxicity    Plan:  Start a regimen of 1250 mg once followed by 1000 mg q12h  Anticipated AUC of 521 mg/L.hr and trough concentration of 15.1 mg/L at steady state  Vancomycin concentration ordered for 5/28 @ 0600  Renal labs as indicated  Pharmacy will continue to monitor patient and adjust therapy as indicated    Thank you for the consult,  Coral Miranda RPH  5/27/2025 3:39 PM   
[facility]   Pharmacy Pharmacokinetic Monitoring Service - Vancomycin    Indication: empyema  Goal AUC/HUGH: 400-600  Day of Therapy: 3  Additional Antimicrobials: pip-tazo    Pertinent Laboratory Values:   Wt Readings from Last 1 Encounters:   05/27/25 52.2 kg (115 lb)     Temp Readings from Last 1 Encounters:   05/29/25 98.6 °F (37 °C) (Axillary)     Estimated Creatinine Clearance: 88 mL/min (A) (based on SCr of 0.45 mg/dL (L)).    Recent Labs     05/27/25  0304 05/28/25  0301   CREATININE 0.42* 0.45*   BUN 10 9   WBC 10.8 11.1     Pertinent Cultures:  Date Source Results   5/25 blood NGTD   5/27 Body fluid pending   MRSA Nasal Swab: NA    Assessment:  Date Current Dose Level (mg/L) Timing of Level (h) AUC/HUGH   5/27 1,250 mg x1 - - -   5/28 1,000 mg q12h 12.2 6 383   5/29 1,000 mg q12h - - -   Note: Serum concentrations collected for AUC dosing may appear elevated if collected in close proximity to the dose administered, this is not necessarily an indication of toxicity    Plan:  Continue current dose of 1,000 mg q12h  Ordered a level for 5/30 with AM labs  Pharmacy will continue to monitor patient and adjust therapy as indicated    Thank you for the consult,  Julito Griffiths RPH  5/29/2025  
Dr. Chin and I observed patient's chest tube site seems to have moved partially, patient is not in any acute respiratory distress, patient's oxygen saturations still holding steady at 98%, no bubbling in water seal chamber, occasional bubbling noted earlier, Dr. Chin ordered stat chest x-ray, awaiting scan now.  
Nutrition Assessment     Type and Reason for Visit: Reassess    Nutrition Recommendations/Plan:   Attempted to call patient to discuss nutrition POC, she did not answer  Continue to monitor tolerance of PO, compliance of oral supplements, weight, labs, and plan of care during admission.  Continue current diet as tolerated., Encourage PO intake of meals and supplements       Malnutrition Assessment:  Malnutrition Status: Severe malnutrition    Nutrition Assessment:  scheduled RD follow up, last seen 4 days ago when appetite was poor. Pt on Regular diet with ONS ordered- both standard ensure plus HP TID and Joe BID and frozen nutr supplement with dinner. No new weight.    Estimated Daily Nutrient Needs:  Energy (kcal):  3102-8759 kcal (30-35 kcal/kg) Weight Used for Energy Requirements: Current     Protein (g):  63-78 g (1.2-1.5 g/kg) Weight Used for Protein Requirements: Current        Fluid (ml/day):  8676-4720 mL or per MD Method Used for Fluid Requirements: 1 ml/kcal    Nutrition Related Findings:   LBM 6/5 Wound Type: Pressure Injury, Unstageable    Current Nutrition Therapies:    ADULT ORAL NUTRITION SUPPLEMENT; Breakfast, Lunch; Standard High Calorie/High Protein Oral Supplement  ADULT ORAL NUTRITION SUPPLEMENT; Breakfast, Dinner; Wound Healing Oral Supplement  ADULT ORAL NUTRITION SUPPLEMENT; Dinner; Frozen Oral Supplement  ADULT DIET; Regular; No aplsauce/apl juice/grp juice    Anthropometric Measures:  Height: 165.1 cm (5' 5\")  Current Body Wt: 52.3 kg (115 lb 4.8 oz)   BMI: 19.2        Nutrition Diagnosis:   Severe malnutrition, in context of chronic illness related to inadequate protein-energy intake as evidenced by criteria as identified in malnutrition assessment  Underweight related to increase demand for energy/nutrients, inadequate protein-energy intake as evidenced by wounds, poor intake prior to admission, weight loss, BMI    Nutrition Interventions:   Food and/or Nutrient Delivery: Continue 
OT attempted. Pt politely refusing c/o fatigue.    Will continue to follow.  
Occupational Therapy  OT orders received and chart reviewed. Pt politely declines therapy at this time due to increased pain. She stated \"I am giving myself today to rest but I will be ready to go tomorrow.\" Will follow up as schedule allows.     Thank you,  Anju Irvin OTD, OTR/L  
Patient has refused all breathing treatments. She said she does not take any at home, not even an inhaler. She said she quit smoking 40 yrs ago. Advised her if she need anything to let nurse know and I would return.  
Perfetserve dr if pt can go off floor to Angio. Dr states yes and order modified per dr Clark  
Physical Therapy  Attempted to see pt for PT session, requesting PT to come back at a later time due to being on phone with family.     Yaima Gilmore PT, DPT    
Physical Therapy  PT orders received and chart reviewed. Pt politely declines therapy at this time due to increased pain. She stated \"I am giving myself today to rest but I will be ready to go tomorrow.\" Will follow up as schedule allows.     Thank you.  Luz Maria Bazzi PT, DPT    
TRANSFER - OUT REPORT:    Verbal report given to YAMILA Branch on Cori Dennis  being transferred to [facility] for routine progression of patient care       Report consisted of patient's Situation, Background, Assessment and   Recommendations(SBAR).     Information from the following report(s) Nurse Handoff Report and MAR was reviewed with the receiving nurse.           Lines:       Opportunity for questions and clarification was provided.      Patient transported with:  Patient belongings that were reviewed by patient.     PIV and telemetry removed.         
The patient is new to me today.  She was seen and examined at the bedside in follow-up for suspected recurrent left empyema and acute respiratory failure among other issues.  She was admitted earlier this morning by my colleague.  The patient complained of shooting pain over the left breast.  She said that the pain started a day or 2 days ago.  She denied any falls while she was at the rehabilitation facility.    The patient does not want her medical information to be discussed with her family or friends without her permission.    Physical exam: Patient is alert and oriented; right-sided visual impairment present; S1, S2 auscultated; crackles auscultated over the left lung; skin is warm and dry      - CT angiogram of the thorax reported a left hydropneumothorax, which is concerning for a recurrent left parapneumonic effusion/empyema.  Patient was recently discharged from [facility] after being managed for a left empyema.  During that hospitalization, the patient underwent VATS with minithoracotomy, partial decortication, empyema evacuation and pleurodesis on April 22, 2025.  -CT surgery consulted for possible repeat VATS  -Ultrasound-guided thoracentesis and pleural fluid studies have been ordered.  -Continue cefepime and vancomycin for empiric antibiotic coverage.  Continue supportive care with analgesics, antitussives and antiemetics as needed.  -Pulmonology and ID input appreciated    Please refer to the H&P by my colleague for additional details.  
    Assessment/Plan:     1.  Acute hypoxic respiratory failure  2 empyema left hydropneumothorax-MRSA positive pleural fluid culture  3 intermittent atrial fibrillation with RVR  4 hypertension  5 peptic ulcer disease  6 history of Raynaud's disease  7 hypothyroidism      PLAN:  - Patient's chest tube on left side is intact  - She is getting fibrinolytics  - Follow pulmonary recommendations  -Currently on Zyvox    Vitas Reviewed   LABS Reviewed         Dispo Plan: Home    ===============================      Objective:       General:  Alert, cooperative, no acute distress   HEENT: No facial asymmetry, VICTOR M Tonny, External ears - WNL    Cardiovascular: S1S2 - regular , No Murmur   Pulmonary: Equal expansion , No Use of accessory muscles , No Rales No Rhonchi    GI:  +BS in all four quadrants, soft, non-tender  Extremities:  No edema; 2+ dorsalis pedis pulses bilaterally  Neuro: Alert and oriented X 2.       DVT Prophylaxis:  []Lovenox  []Hep SQ  []SCDs  []Coumadin   []On Heparin gtt    [] Eliquis [] Xarelto     Vitals:         VS: /71   Pulse 92   Temp 98.7 °F (37.1 °C) (Oral)   Resp 25   Ht 1.651 m (5' 5\")   Wt 52.2 kg (115 lb)   SpO2 97%   BMI 19.14 kg/m²    Tmax/24hrs: Temp (24hrs), Av.2 °F (36.8 °C), Min:97.4 °F (36.3 °C), Max:98.7 °F (37.1 °C)        Medications:   Current Facility-Administered Medications   Medication Dose Route Frequency    ALTEplase (CATHFLO) 10 mg in sodium chloride (PF) 0.9 % 30 mL  10 mg IntraPLEUral Q12H    And    dornase alpha (PULMOZYME) 5 mg in sterile water 30 mL  5 mg IntraPLEUral Q12H    ketorolac (TORADOL) injection 15 mg  15 mg IntraVENous Q6H PRN    loperamide (IMODIUM) capsule 2 mg  2 mg Oral 4x Daily PRN    linezolid (ZYVOX) tablet 600 mg  600 mg Oral 2 times per day    sodium chloride flush 0.9 % injection 5-40 mL  5-40 mL IntraVENous 2 times per day    sodium chloride flush 0.9 % injection 5-40 mL  5-40 mL IntraVENous PRN    0.9 % sodium chloride infusion   
Obtain  pleural fluid for analysis.  Follow-up CT scan to assess the size of the pleural effusion.  
Physicians  [address] [facility], [address], [place] [address]  Office: [phone], Ext [phone]       Microbiology:   blood culture: No growth to date    Subjective:   Was seen in CT scan holding waiting for chest tube.  She is very upset that she needs another chest tube placement.     Tmax 98.2  WBC 11.1      Objective:      /62   Pulse 85   Temp 98.2 °F (36.8 °C) (Axillary)   Resp 20   Ht 1.651 m (5' 5\")   Wt 52.2 kg (115 lb)   SpO2 99%   BMI 19.14 kg/m²   Temp (24hrs), Av.7 °F (37.1 °C), Min:98.2 °F (36.8 °C), Max:99.3 °F (37.4 °C)        General:   awake alert and oriented, chronically ill-appearing, cachectic   Skin:   dry and warm; no rashes noted on limited exam   HEENT:  No scleral icterus or pallor; oral mucosa moist, lips moist   Lymph Nodes:   not assessed today   Lungs:   Non-labored; decreased breath sounds left greater than right with poor inspiratory effort.  No wheezes   Heart:  RRR, s1 and s2; no murmurs rubs or gallops; no edema, + pedal pulses   Abdomen:  soft, non-distended, active bowel sounds, non-tender   Genitourinary:  deferred   Extremities:   no contractures, no joint effusions or swelling, muscle mass appropriate for age.    Neurologic:  No focal sensory deficits. Follows commands. Speech is clear. Memory intact. No focal weakness. Strength equal in UE and LE   Psychiatric:   appropriate and interactive.         Lab results:  Chemistry  Recent Labs     25  0150 25  0304 25  030   * 131* 133*   K 3.6 3.4* 4.0    98 102   CO2 23 25 23   BUN 11 10 9   GLOB 4.5*  --   --        CBC w/ Diff  Recent Labs     25  0150 25  0304 25  0301   WBC 12.0 10.8 11.1   RBC 3.20* 3.10* 3.31*   HGB 8.6* 8.1* 8.7*   HCT 28.6* 27.1* 29.2*   * 617* 645*       Microbiology  Results       Procedure Component Value Units Date/Time    Strep Pneumoniae Antigen [[id number]]     Order Status: Sent Specimen: Urine, clean catch     Legionella 
injection 5-40 mL  5-40 mL IntraVENous 2 times per day    sodium chloride flush 0.9 % injection 5-40 mL  5-40 mL IntraVENous PRN    0.9 % sodium chloride infusion   IntraVENous PRN    potassium chloride (KLOR-CON M) extended release tablet 40 mEq  40 mEq Oral PRN    Or    potassium bicarb-citric acid (EFFER-K) effervescent tablet 40 mEq  40 mEq Oral PRN    Or    potassium chloride 10 mEq/100 mL IVPB (Peripheral Line)  10 mEq IntraVENous PRN    magnesium sulfate 2000 mg in 50 mL IVPB premix  2,000 mg IntraVENous PRN    enoxaparin (LOVENOX) injection 40 mg  40 mg SubCUTAneous Daily    ondansetron (ZOFRAN-ODT) disintegrating tablet 4 mg  4 mg Oral Q8H PRN    Or    ondansetron (ZOFRAN) injection 4 mg  4 mg IntraVENous Q6H PRN    polyethylene glycol (GLYCOLAX) packet 17 g  17 g Oral Daily PRN    acetaminophen (TYLENOL) tablet 650 mg  650 mg Oral Q6H PRN    Or    acetaminophen (TYLENOL) suppository 650 mg  650 mg Rectal Q6H PRN    traMADol (ULTRAM) tablet 50 mg  50 mg Oral Q4H PRN    lactobacillus (CULTURELLE) capsule 1 capsule  1 capsule Oral Daily with breakfast    pantoprazole (PROTONIX) tablet 40 mg  40 mg Oral QAM AC    levothyroxine (SYNTHROID) tablet 200 mcg  200 mcg Oral Daily    meclizine (ANTIVERT) tablet 25 mg  25 mg Oral Q8H PRN    multivitamin 1 tablet  1 tablet Oral Daily    rosuvastatin (CRESTOR) tablet 10 mg  10 mg Oral Daily        Labs:    No results found for this or any previous visit (from the past 24 hours).        Imaging:  CTA CHEST W WO CONTRAST  Result Date: 5/24/2025  EXAM:  CTA CHEST W WO CONTRAST INDICATION: Hypoxia and tachycardia. History of recurrent left pleural effusion. COMPARISON: 3/27/2025 TECHNIQUE: Helical thin section chest CT following intravenous administration of nonionic contrast 100 mL of isovue 370 according to departmental PE protocol. Coronal and sagittal reformats were performed. 3D post processing was performed.  CT dose reduction was achieved through the use of a 
left pleural effusion    Hyponatremia    Normocytic anemia    Acute congestive heart failure (HCC)    Empyema, left (HCC)    Severe protein-calorie malnutrition          RECOMMENDATIONS:     Intrapleural lytics started 05/30 - Completed 6 doses  Chest tube placed 05/28, now with cummulative 1696 recorded output, no further output in the last 24hrs, turned off suction, chest tube orders placed for water seal  - chest tube now removed.  Repeat CT scan after lytics on 06/04.  Result noted Images reviewed with patient.  Supplemental oxygen to maintain SpO2 >88%; avoid hyperoxygenation; On RA this am  Follow-up pending labs pleural fluid hematocrit  C3 mildly elevated, TRUDI panel negative, galactomannan WNL. Fungal cultures negative  CT surgery input noted.  Discussed further with Dr. Gibson, no further surgical intervention planned  Bronchodilators -DuoNebs as needed  Steroids - not indicated from a pulmonary standpoint  Antibiotics - Continue Zyvox/Linezolid per ID. BC with NG. Pleural Fluid AF smear negative, Fungal in process, + MRSA(sensitive to Linezolid). No malignant cells seen on cytology  AFB smear negative  TTE results noted  Aspiration precautions including elevating HOB >30deg  Aggressive pulmonary toileting/bronchial hygiene  Frequent incentive spirometry and flutter valve  Assess home Oxygen needs at discharge  PT/OT, OOB, ambulate with assistance as tolerated  DVT, PUD prophylaxis per primary service  Your medical management     Pulmonary medicine will continue to follow    CODE STATUS: Full Code    At this time pulmonary will sign off.  Please reconsult with new issue if needed       Subjective/History:   Subjective 06/07/25   Afebrile.  No other acute event  Feels comfortable other than aching at chest tube site.    Tolerated removal well        HPI:  Per Dr. Weiss 05/25 :\" This patient has been seen and evaluated at the request of Dr. Lopez for recurrent empyema.   Patient is a 75 y.o. female with a 
mg in 50 mL IVPB premix  2,000 mg IntraVENous PRN    enoxaparin (LOVENOX) injection 40 mg  40 mg SubCUTAneous Daily    ondansetron (ZOFRAN-ODT) disintegrating tablet 4 mg  4 mg Oral Q8H PRN    Or    ondansetron (ZOFRAN) injection 4 mg  4 mg IntraVENous Q6H PRN    polyethylene glycol (GLYCOLAX) packet 17 g  17 g Oral Daily PRN    acetaminophen (TYLENOL) tablet 650 mg  650 mg Oral Q6H PRN    Or    acetaminophen (TYLENOL) suppository 650 mg  650 mg Rectal Q6H PRN    traMADol (ULTRAM) tablet 50 mg  50 mg Oral Q4H PRN    lactobacillus (CULTURELLE) capsule 1 capsule  1 capsule Oral Daily with breakfast    pantoprazole (PROTONIX) tablet 40 mg  40 mg Oral QAM AC    levothyroxine (SYNTHROID) tablet 200 mcg  200 mcg Oral Daily    meclizine (ANTIVERT) tablet 25 mg  25 mg Oral Q8H PRN    multivitamin 1 tablet  1 tablet Oral Daily    rosuvastatin (CRESTOR) tablet 10 mg  10 mg Oral Daily    sodium chloride flush 0.9 % injection 5-40 mL  5-40 mL IntraVENous 2 times per day    sodium chloride flush 0.9 % injection 5-40 mL  5-40 mL IntraVENous PRN    0.9 % sodium chloride infusion   IntraVENous PRN    ketorolac (TORADOL) injection 15 mg  15 mg IntraVENous Q6H PRN        Labs:    Recent Results (from the past 24 hours)   Lactate Dehydrogenase    Collection Time: 05/26/25  1:50 AM   Result Value Ref Range     81 - 231 U/L   Comprehensive Metabolic Panel    Collection Time: 05/26/25  1:50 AM   Result Value Ref Range    Sodium 134 (L) 136 - 145 mmol/L    Potassium 3.6 3.5 - 5.5 mmol/L    Chloride 100 98 - 107 mmol/L    CO2 23 21 - 32 mmol/L    Anion Gap 10 3.0 - 18.0 mmol/L    Glucose 100 74 - 108 mg/dL    BUN 11 6 - 23 MG/DL    Creatinine 0.44 (L) 0.6 - 1.3 MG/DL    BUN/Creatinine Ratio 24 (H) 12 - 20      Est, Glom Filt Rate >90 >60 ml/min/1.73m2    Calcium 9.3 8.5 - 10.1 MG/DL    Total Bilirubin 0.3 0.2 - 1.0 MG/DL    ALT 17 10 - 35 U/L    AST 33 10 - 38 U/L    Alk Phosphatase 138 (H) 45 - 117 U/L    Total Protein 6.4 6.4 - 8.2 
monitor output  Intrapleural lytics started 05/30 as output slowed down but still with thick purulent secretions  Supplemental oxygen to maintain SpO2 >88%; avoid hyperoxygenation; On RA this am  Follow-up TRUDI, galactomanan, ADA, pleural fluid hematocrit, and C3/C4  TSurge reccs noted  Bronchodilators - Pulmicort 1 mg twice daily + DuoNeb every 6 hours  Steroids - not indicated from a pulmonary standpoint  Antibiotics - Continue Zyvox/Linezolid per ID. BC with NG. Pleural Fluid AF smear negative, Fungal in process, + MRSA(sensitive to Linezolid) No malignant cells seen on cytology  Strep, Legionella sent and respiratory cultures  TTE results noted  Aspiration precautions including elevating HOB >30deg  Aggressive pulmonary toileting/bronchial hygiene  Frequent incentive spirometry and flutter valve  Assess home Oxygen needs at discharge  PT/OT, OOB, ambulate with assistance as tolerated  DVT, PUD prophylaxis per primary service  Your medical management     Pulmonary medicine will continue to follow    CODE STATUS: Full Code     Subjective/History:   Subjective 05/31/25   No acute event overnight.  Doing well this am. Appears comfortable. Appetite improved. Mainly in bed. PT/OT consult has been placed.     LT Chest tube: Output 450. + Suction, no appreciable air leak.     Need for procedures: Chest tube management    HPI:  Per Dr. Weiss 05/25 :\" This patient has been seen and evaluated at the request of Dr. Lopez for recurrent empyema.   Patient is a 75 y.o. female with a past medical history of PUD, Raynaud's disease, HLD, HTN, hypothyroidism, and OA who presented to [facility] ED overnight with complaints of worsening shortness of breath.  Of note, patient states symptoms have been present for the past few months.  She was apparently admitted to [facility] in [facility] where she was treated for empyema and subsequently discharged but returned to [facility] a few days later with 
pulmonary toileting/bronchial hygiene  Frequent incentive spirometry and flutter valve  Assess home Oxygen needs at discharge  PT/OT, OOB, ambulate with assistance as tolerated  DVT, PUD prophylaxis per primary service  Your medical management     Pulmonary medicine will continue to follow     Subjective/Interval History:   2025  Patient without acute events overnight.  Does admit to slight increase in cough today with very little phlegm production.  Admits to ongoing chills but remains afebrile.  Shortness of breath slightly improved.  Using I-S at bedside-approximately 1 L noted.  No chest pain or abdominal distress at this time.    ROS:Pertinent items are noted in HPI.    Objective:   Vital Signs:    /63   Pulse 94   Temp 98.5 °F (36.9 °C) (Oral)   Resp 27   Ht 1.651 m (5' 5\")   Wt 52.3 kg (115 lb 4.8 oz)   SpO2 100%   BMI 19.19 kg/m²             Temp (24hrs), Av.4 °F (36.9 °C), Min:97.9 °F (36.6 °C), Max:99.3 °F (37.4 °C)       Intake/Output:   Last shift:      No intake/output data recorded.  Last 3 shifts:  190 -  0700  In: -   Out: 350 [Urine:350]  No intake or output data in the 24 hours ending 25 1021     Physical Exam:   General: in no apparent distress, alert, and oriented times 3   HEENT: PERRLA, EOMI, fundi benign   Neck: No abnormally enlarged lymph nodes.   Lungs: +Lt basilar crackles. No wheezes. No tachypnea or accessory muscle use    Heart: Regular rate and rhythm or without murmur or extra heart sounds   Abdomen: abdomen is soft without significant tenderness, masses, organomegaly or guarding   Extremity: no leg edema.    Neuro: alert, grossly non-focal        DATA:  Labs:  Recent Labs     25  1020 25  0210 25  0150   WBC 13.7* 12.2 12.0   HGB 11.2* 8.6* 8.6*   HCT 36.6 28.1* 28.6*   * 594* 604*     Recent Labs     25  1020 25  0210 25  0150   * 132* 134*   K 5.0 3.8 3.6   CL 96* 98 100   CO2 24 22 23   BUN 13 
times per day    sodium chloride flush 0.9 % injection 5-40 mL  5-40 mL IntraVENous PRN    0.9 % sodium chloride infusion   IntraVENous PRN    potassium chloride (KLOR-CON M) extended release tablet 40 mEq  40 mEq Oral PRN    Or    potassium bicarb-citric acid (EFFER-K) effervescent tablet 40 mEq  40 mEq Oral PRN    Or    potassium chloride 10 mEq/100 mL IVPB (Peripheral Line)  10 mEq IntraVENous PRN    magnesium sulfate 2000 mg in 50 mL IVPB premix  2,000 mg IntraVENous PRN    [Held by provider] enoxaparin (LOVENOX) injection 40 mg  40 mg SubCUTAneous Daily    ondansetron (ZOFRAN-ODT) disintegrating tablet 4 mg  4 mg Oral Q8H PRN    Or    ondansetron (ZOFRAN) injection 4 mg  4 mg IntraVENous Q6H PRN    polyethylene glycol (GLYCOLAX) packet 17 g  17 g Oral Daily PRN    acetaminophen (TYLENOL) tablet 650 mg  650 mg Oral Q6H PRN    Or    acetaminophen (TYLENOL) suppository 650 mg  650 mg Rectal Q6H PRN    traMADol (ULTRAM) tablet 50 mg  50 mg Oral Q4H PRN    lactobacillus (CULTURELLE) capsule 1 capsule  1 capsule Oral Daily with breakfast    pantoprazole (PROTONIX) tablet 40 mg  40 mg Oral QAM AC    levothyroxine (SYNTHROID) tablet 200 mcg  200 mcg Oral Daily    meclizine (ANTIVERT) tablet 25 mg  25 mg Oral Q8H PRN    multivitamin 1 tablet  1 tablet Oral Daily    rosuvastatin (CRESTOR) tablet 10 mg  10 mg Oral Daily    ketorolac (TORADOL) injection 15 mg  15 mg IntraVENous Q6H PRN        Labs:    Recent Results (from the past 24 hours)   Cell Count with Differential, Body Fluid    Collection Time: 05/27/25  2:30 PM   Result Value Ref Range    Specimen PLEURAL FLUID      Color, Fluid BROWN      Appearance, Fluid OPAQUE      RBC, Fluid 62,000 (A) NRRE /cu mm    Total Nucleated Cell Count >100,000 (A) NRRE /cu mm    Polys, Fluid 97 (H) 0 - 25 %    Bands, Fluid 0 %    Lymphocytes, Body Fluid 2 (A) NRRE %    Monocyte Count, Fluid 1 (A) NRRE %    Eos, Fluid 0 (A) NRRE %   Culture, Body Fluid (with Gram Stain)    Collection 
Blood Updated: 05/27/25 0608     Special Requests NO SPECIAL REQUESTS        Culture NO GROWTH 2 DAYS       Culture with Smear, Acid Fast Bacillius [[id number]]     Order Status: Sent Specimen: Body Fluid     Culture, Fungus [[id number]]     Order Status: Sent Specimen: Body Fluid     Gram stain [[id number]]     Order Status: Sent Specimen: Body Fluid     Culture, Body Fluid (with Gram Stain) [[id number]]     Order Status: Sent Specimen: Body Fluid     Strep Pneumoniae Antigen [[id number]]     Order Status: Sent Specimen: Urine, clean catch     Legionella antigen, urine [[id number]]     Order Status: Sent Specimen: Urine     Culture, Respiratory [[id number]]     Order Status: Sent Specimen: Sputum Expectorated               Janice Robins DO   5/27/2025   
Differential    Collection Time: 05/31/25  3:26 AM   Result Value Ref Range    WBC 10.6 4.6 - 13.2 K/uL    RBC 3.15 (L) 4.20 - 5.30 M/uL    Hemoglobin 8.4 (L) 12.0 - 16.0 g/dL    Hematocrit 28.0 (L) 35.0 - 45.0 %    MCV 88.9 78.0 - 100.0 FL    MCH 26.7 24.0 - 34.0 PG    MCHC 30.0 (L) 31.0 - 37.0 g/dL    RDW 17.4 (H) 11.6 - 14.5 %    Platelets 742 (H) 135 - 420 K/uL    MPV 8.6 (L) 9.2 - 11.8 FL    Nucleated RBCs 0.0 0  WBC    nRBC 0.00 0.00 - 0.01 K/uL    Neutrophils % 77.7 (H) 40.0 - 73.0 %    Lymphocytes % 9.8 (L) 21.0 - 52.0 %    Monocytes % 7.4 3.0 - 10.0 %    Eosinophils % 3.8 0.0 - 5.0 %    Basophils % 0.6 0.0 - 2.0 %    Immature Granulocytes % 0.7 (H) 0.0 - 0.5 %    Neutrophils Absolute 8.28 (H) 1.80 - 8.00 K/UL    Lymphocytes Absolute 1.04 0.90 - 3.60 K/UL    Monocytes Absolute 0.79 0.05 - 1.20 K/UL    Eosinophils Absolute 0.40 0.00 - 0.40 K/UL    Basophils Absolute 0.06 0.00 - 0.10 K/UL    Immature Granulocytes Absolute 0.07 (H) 0.00 - 0.04 K/UL    Differential Type AUTOMATED           Imaging:  CTA CHEST W WO CONTRAST  Result Date: 5/24/2025  EXAM:  CTA CHEST W WO CONTRAST INDICATION: Hypoxia and tachycardia. History of recurrent left pleural effusion. COMPARISON: 3/27/2025 TECHNIQUE: Helical thin section chest CT following intravenous administration of nonionic contrast 100 mL of isovue 370 according to departmental PE protocol. Coronal and sagittal reformats were performed. 3D post processing was performed.  CT dose reduction was achieved through the use of a standardized protocol tailored for this examination and automatic exposure control for dose modulation. FINDINGS: This is a good quality study for the evaluation of pulmonary embolism to the first subsegmental arterial level. There is no pulmonary embolism to this level. MEDIASTINUM: No mass or lymphadenopathy. SAMEERA: No mass or lymphadenopathy. THORACIC AORTA: No aneurysm. HEART: Normal in size. ESOPHAGUS: No wall thickening or dilatation. 
Status: Sent Specimen: Urine, clean catch     Legionella antigen, urine [[id number]]     Order Status: Sent Specimen: Urine     Culture, Body Fluid (with Gram Stain) [[id number]]  (Abnormal) Collected: 05/27/25 1430    Order Status: Completed Specimen: Body Fluid Updated: 05/29/25 1108     Special Requests PLEURAL FLUID        Gram Stain MANY WBCS SEEN         NO ORGANISMS SEEN        Culture       LIGHT PROBABLE Preliminary report of Methicillin Resistant Staphylococcus aureus by antigen detection. Confirmation and Sensitivities to follow. SENSITIVITY TO FOLLOW                  Culture performed on Unspun Fluid            (NOTE) PRELIMINARY REPORT OF MRSA BY ANTIGEN DETECTION CALLED TO AND READ BACK BY TOBIAS ROD RN AT 1107 ON 5/29/25. CH.    Culture with Smear, Acid Fast Bacillius [[id number]] Collected: 05/27/25 1430    Order Status: Sent Specimen: Body Fluid Updated: 05/27/25 1526    Culture, Fungus [[id number]] Collected: 05/27/25 1430    Order Status: Sent Specimen: Body Fluid Updated: 05/27/25 2216    Culture, Blood 1 [[id number]] Collected: 05/25/25 1245    Order Status: Completed Specimen: Blood Updated: 05/29/25 0650     Special Requests NO SPECIAL REQUESTS        Culture NO GROWTH 4 DAYS       Culture, Blood 2 [[id number]] Collected: 05/25/25 1230    Order Status: Completed Specimen: Blood Updated: 05/29/25 0650     Special Requests NO SPECIAL REQUESTS        Culture NO GROWTH 4 DAYS       Strep Pneumoniae Antigen [[id number]] Collected: 05/25/25 1000    Order Status: Canceled Specimen: Urine, clean catch     Legionella antigen, urine [[id number]] Collected: 05/25/25 1000    Order Status: Canceled Specimen: Urine     Culture, Respiratory [[id number]] Collected: 05/25/25 1000    Order Status: Canceled Specimen: Sputum Expectorated     Gram stain [[id number]] Collected: 05/25/25 1000    Order Status: Canceled Specimen: Body Fluid               Janice Robins DO   5/29/2025   
WBC 10.8 4.6 - 13.2 K/uL    RBC 3.10 (L) 4.20 - 5.30 M/uL    Hemoglobin 8.1 (L) 12.0 - 16.0 g/dL    Hematocrit 27.1 (L) 35.0 - 45.0 %    MCV 87.4 78.0 - 100.0 FL    MCH 26.1 24.0 - 34.0 PG    MCHC 29.9 (L) 31.0 - 37.0 g/dL    RDW 17.2 (H) 11.6 - 14.5 %    Platelets 617 (H) 135 - 420 K/uL    MPV 8.8 (L) 9.2 - 11.8 FL    Nucleated RBCs 0.0 0  WBC    nRBC 0.00 0.00 - 0.01 K/uL    Neutrophils % 78.2 (H) 40.0 - 73.0 %    Lymphocytes % 8.6 (L) 21.0 - 52.0 %    Monocytes % 9.4 3.0 - 10.0 %    Eosinophils % 2.5 0.0 - 5.0 %    Basophils % 0.5 0.0 - 2.0 %    Immature Granulocytes % 0.8 (H) 0.0 - 0.5 %    Neutrophils Absolute 8.44 (H) 1.80 - 8.00 K/UL    Lymphocytes Absolute 0.93 0.90 - 3.60 K/UL    Monocytes Absolute 1.02 0.05 - 1.20 K/UL    Eosinophils Absolute 0.27 0.00 - 0.40 K/UL    Basophils Absolute 0.05 0.00 - 0.10 K/UL    Immature Granulocytes Absolute 0.09 (H) 0.00 - 0.04 K/UL    Differential Type AUTOMATED     Echo (TTE) complete (PRN contrast/bubble/strain/3D)    Collection Time: 05/27/25 10:36 AM   Result Value Ref Range    Body Surface Area 1.55 m2    IVSd 0.9 0.6 - 0.9 cm    LVIDd 4.5 3.9 - 5.3 cm    LVIDs 3.3 cm    LVOT Diameter 2.2 cm    LVPWd 0.9 0.6 - 0.9 cm    EF BP 53 (A) 55 - 100 %    LV Ejection Fraction A2C 51 %    LV Ejection Fraction A4C 54 %    LV EDV A2C 66 mL    LV EDV A4C 72 mL    LV EDV BP 70 56 - 104 mL    LV ESV A2C 32 mL    LV ESV A4C 33 mL    LV ESV BP 33 19 - 49 mL    LVOT Peak Gradient 3 mmHg    LVOT Mean Gradient 2 mmHg    LVOT SV 54.7 ml    LVOT Peak Velocity 0.9 m/s    LVOT VTI 14.4 cm    RVOT Peak Gradient 3 mmHg    RVOT Peak Velocity 0.9 m/s    LA Volume A/L 49 mL    LA Volume A-L A4C 55 (A) 22 - 52 mL    LA Volume A-L A4C 42 22 - 52 mL    LA Volume MOD A2C 48 22 - 52 mL    LA Volume MOD A4C 37 22 - 52 mL    AV Area by Peak Velocity 2.6 cm2    AV Area by VTI 2.3 cm2    AV Peak Gradient 6 mmHg    AV Mean Gradient 4 mmHg    AV Peak Velocity 1.3 m/s    AV Mean Velocity 1.0 m/s    
context of chronic illness related to inadequate protein-energy intake as evidenced by criteria as identified in malnutrition assessment    Underweight related to increase demand for energy/nutrients, inadequate protein-energy intake as evidenced by wounds, poor intake prior to admission, weight loss, BMI    Nutrition Interventions:   Food and/or Nutrient Delivery: Continue Current Diet, Vitamin Supplement, Modify Oral Nutrition Supplement  Nutrition Education/Counseling: No recommendation at this time  Coordination of Nutrition Care: Continue to monitor while inpatient  Plan of Care discussed with: Pt    Goals:  Previous Goal Met: Progressing toward Goal(s)  Goals: PO intake 75% or greater, by next RD assessment       Nutrition Monitoring and Evaluation:   Behavioral-Environmental Outcomes: None Identified  Food/Nutrient Intake Outcomes: Food and Nutrient Intake, Supplement Intake, Vitamin/Mineral Intake  Physical Signs/Symptoms Outcomes: Biochemical Data, Chewing or Swallowing, GI Status, Nausea or Vomiting, Nutrition Focused Physical Findings, Skin, Weight    Discharge Planning:    Continue current diet, Continue Oral Nutrition Supplement     Caitlin Chacon MS, RDN, LDN  Contact: [phone]    Available via [employer]  
1 tablet by mouth daily.    Mohini Hernandez MD   amphetamine-dextroamphetamine (ADDERALL) 5 MG tablet     Mohini Hernandez MD   amphetamine-dextroamphetamine (ADDERALL XR) 30 MG extended release capsule Take 1 tablet by mouth daily.    Mohini Hernandez MD   amphetamine-dextroamphetamine (ADDERALL XR) 30 MG extended release capsule Take 1 capsule by mouth every morning.    Mohini Hernandez MD   amphetamine-dextroamphetamine (ADDERALL) 30 MG tablet Take 1 tablet by mouth every morning.    Mohini Hernandez MD   cefadroxil (DURICEF) 500 MG capsule     Mohini Hernandez MD   cephALEXin (KEFLEX) 500 MG capsule     Mohini Hernandez MD   ciprofloxacin (CIPRO) 500 MG tablet Take 1 tablet by mouth in the morning and 1 tablet in the evening.    Mohini Hernandez MD   clotrimazole (MYCELEX) 10 MG estefania     Mohini Hernandez MD   clotrimazole-betamethasone (LOTRISONE) 1-0.05 % cream     Mohini Hernandez MD   doxycycline hyclate (VIBRA-TABS) 100 MG tablet     Mohini Hernandez MD   fluticasone (FLONASE) 50 MCG/ACT nasal spray 2 spray in each nostril Nasally Twice a day for 30 days 1/24/25   Mohini Hernandez MD   HYDROcodone-acetaminophen (NORCO)  MG per tablet     Mohini Hernandez MD   Influenza Vac Split High-Dose (FLUZONE) 0.5 ML JAYESH injection TO BE ADMINISTERED BY PHARMACIST FOR IMMUNIZATION    Mohini Hernandez MD   Influenza Vac Split High-Dose (FLUZONE) 0.5 ML JAYESH injection TO BE ADMINISTERED BY PHARMACIST FOR IMMUNIZATION    Mohini Hernandez MD   Influenza Vac Split High-Dose (FLUZONE) 0.5 ML JAYESH injection TO BE ADMINISTERED BY PHARMACIST FOR IMMUNIZATION    Mohini Hernandez MD   levoFLOXacin (LEVAQUIN) 500 MG tablet     Mohini Hernandez MD   loperamide (IMODIUM) 2 MG capsule     Mohini Hernandez MD   meclizine (ANTIVERT) 12.5 MG tablet Take 1 tablet by mouth in the morning, at noon, in the evening, and at bedtime    Mary 
40 mg  40 mg SubCUTAneous Daily    ondansetron (ZOFRAN-ODT) disintegrating tablet 4 mg  4 mg Oral Q8H PRN    Or    ondansetron (ZOFRAN) injection 4 mg  4 mg IntraVENous Q6H PRN    polyethylene glycol (GLYCOLAX) packet 17 g  17 g Oral Daily PRN    acetaminophen (TYLENOL) tablet 650 mg  650 mg Oral Q6H PRN    Or    acetaminophen (TYLENOL) suppository 650 mg  650 mg Rectal Q6H PRN    lactobacillus (CULTURELLE) capsule 1 capsule  1 capsule Oral Daily with breakfast    pantoprazole (PROTONIX) tablet 40 mg  40 mg Oral QAM AC    levothyroxine (SYNTHROID) tablet 200 mcg  200 mcg Oral Daily    meclizine (ANTIVERT) tablet 25 mg  25 mg Oral Q8H PRN    multivitamin 1 tablet  1 tablet Oral Daily    rosuvastatin (CRESTOR) tablet 10 mg  10 mg Oral Daily       Bryan Alston DO    June 9, 2025  
PLEURAL FLUID        Culture       NO FUNGUS ISOLATED 13 DAYS                    Janice Robins DO   6/9/2025   
SPECIAL REQUESTS        Culture NO GROWTH 6 DAYS       Culture, Blood 2 [[id number]] Collected: 05/25/25 1230    Order Status: Completed Specimen: Blood Updated: 05/31/25 0648     Special Requests NO SPECIAL REQUESTS        Culture NO GROWTH 6 DAYS       Strep Pneumoniae Antigen [[id number]] Collected: 05/25/25 1000    Order Status: Canceled Specimen: Urine, clean catch     Legionella antigen, urine [[id number]] Collected: 05/25/25 1000    Order Status: Canceled Specimen: Urine     Culture, Respiratory [[id number]] Collected: 05/25/25 1000    Order Status: Canceled Specimen: Sputum Expectorated     Gram stain [[id number]] Collected: 05/25/25 1000    Order Status: Canceled Specimen: Body Fluid               Janice Robins DO   6/6/2025   
Specimen: Blood Updated: 05/31/25 0648     Special Requests NO SPECIAL REQUESTS        Culture NO GROWTH 6 DAYS       Culture, Blood 2 [[id number]] Collected: 05/25/25 1230    Order Status: Completed Specimen: Blood Updated: 05/31/25 0648     Special Requests NO SPECIAL REQUESTS        Culture NO GROWTH 6 DAYS       Strep Pneumoniae Antigen [[id number]] Collected: 05/25/25 1000    Order Status: Canceled Specimen: Urine, clean catch     Legionella antigen, urine [[id number]] Collected: 05/25/25 1000    Order Status: Canceled Specimen: Urine     Culture, Respiratory [[id number]] Collected: 05/25/25 1000    Order Status: Canceled Specimen: Sputum Expectorated     Gram stain [[id number]] Collected: 05/25/25 1000    Order Status: Canceled Specimen: Body Fluid               Janice Robins DO   6/4/2025   
[[id number]] Collected: 05/25/25 1230    Order Status: Completed Specimen: Blood Updated: 05/31/25 0648     Special Requests NO SPECIAL REQUESTS        Culture NO GROWTH 6 DAYS       Strep Pneumoniae Antigen [[id number]] Collected: 05/25/25 1000    Order Status: Canceled Specimen: Urine, clean catch     Legionella antigen, urine [[id number]] Collected: 05/25/25 1000    Order Status: Canceled Specimen: Urine     Culture, Respiratory [[id number]] Collected: 05/25/25 1000    Order Status: Canceled Specimen: Sputum Expectorated     Gram stain [[id number]] Collected: 05/25/25 1000    Order Status: Canceled Specimen: Body Fluid               Janice Robins DO   6/3/2025   
meclizine (ANTIVERT) tablet 25 mg  25 mg Oral Q8H PRN    multivitamin 1 tablet  1 tablet Oral Daily    rosuvastatin (CRESTOR) tablet 10 mg  10 mg Oral Daily       Labs:    Recent Labs     06/07/25  0209 06/07/25  1225 06/08/25  0331   WBC 8.0 8.2 8.4   HGB 8.7* 9.4* 8.4*   HCT 28.2* 30.9* 27.7*   * 521* 454*       Recent Labs     06/07/25  0209 06/07/25  1225 06/08/25  0331    136 138   K 4.3 3.9 4.2    101 102   CO2 25 24 25   BUN 18 14 18           Time spent on direct patient care >35 mints     Complexity : High complex - due to multiple medical issues outlined above.     CODE Status : Full code    Case discussed with:   [] Family  [x]Nursing  []Case Management         Disclaimer: Sections of this note are dictated utilizing voice recognition software, which may have resulted in some phonetic based errors in grammar and contents. Even though attempts were made to correct all the mistakes, some may have been missed, and remained in the body of the document. If questions arise, please contact our department.    Signed By: Ventura Sylvester MD     June 8, 2025      
calorie malnutrition  Continue nutritional supplements    8.  Acute diarrhea  Likely antibiotic associated but diarrhea is resolved    Please contact Dr. Clark if there are any questions. Greater than 35 minutes were spent reviewing the patient's chart, obtaining a thorough history, performing a physical exam, placing orders and dictating this document.  Findings and plan were also discussed with the patient/patient's family and with RN.  Greater than 50% of the time was spent on direct patient care.         Signed By: [unfilled]     May 30, 2025 4:52 PM     
 Provider, MD Mohini   rosuvastatin (CRESTOR) 10 MG tablet Take 1 tablet by mouth daily    Provider, MD Mohini   sucralfate (CARAFATE) 1 GM tablet     Mohini Hernandez MD   traMADol (ULTRAM) 50 MG tablet     Mohini Hernandez MD   valACYclovir (VALTREX) 1 g tablet     ProviderMohini MD   Benzocaine-Menthol (CEPACOL) 6-10 MG LOZG lozenge Take 1 lozenge by mouth every 2 hours as needed for Sore Throat 3/30/25   Denys Coe MD   phenol 1.4 % LIQD mouth spray Take 1 spray by mouth every 2 hours as needed for Sore Throat 3/30/25   Denys Coe MD   ergocalciferol (ERGOCALCIFEROL) 1.25 MG (00916 UT) capsule Take 1 capsule by mouth once a week 8/16/21   Automatic Reconciliation, Ar   ibuprofen (ADVIL;MOTRIN) 400 MG tablet Take 1 tablet by mouth every 6 hours as needed 5/29/22   Automatic Reconciliation, Ar   levothyroxine (SYNTHROID) 200 MCG tablet Take 1 tablet by mouth daily 1/14/22   Automatic Reconciliation, Ar   PARoxetine (PAXIL) 40 MG tablet Take 1 tablet by mouth daily 11/19/21   Automatic Reconciliation, Ar   solifenacin (VESICARE) 10 MG tablet Vesicare 10 mg tablet   TAKE 1 TABLET BY MOUTH EVERY DAY    Automatic Reconciliation, Ar   zoster recombinant adjuvanted vaccine (SHINGRIX) 50 MCG/0.5ML SUSR injection Shingrix (PF) 50 mcg/0.5 mL intramuscular suspension, kit   PHARMACIST ADMINISTERED IMMUNIZATION ADMINISTERED AT TIME OF DISPENSING    Automatic Reconciliation, Ar     [unfilled]  Allergies   Allergen Reactions    Codeine Rash    Dilaudid [Hydromorphone] Hallucinations    Morphine Hallucinations    Percocet [Oxycodone-Acetaminophen] Hallucinations      Social History     Tobacco Use    Smoking status: Former    Smokeless tobacco: Never   Substance Use Topics    Alcohol use: Yes     Comment: socially      No family history on file.     Review of Systems:  Pertinent items are noted in HPI.  Review of Systems   Constitutional:  Negative for chills and fever. 
[[id number]] Collected: 05/28/25 1100    Order Status: Canceled Specimen: Urine, clean catch     Legionella antigen, urine [[id number]] Collected: 05/28/25 1100    Order Status: Canceled Specimen: Urine     Culture, Body Fluid (with Gram Stain) [[id number]]  (Abnormal)  (Susceptibility) Collected: 05/27/25 1430    Order Status: Completed Specimen: Body Fluid Updated: 05/30/25 0731     Special Requests PLEURAL FLUID        Gram Stain MANY WBCS SEEN         NO ORGANISMS SEEN        Culture       LIGHT Methicillin Resistant Staphylococcus aureus                  Culture performed on Unspun Fluid            (NOTE) PRELIMINARY REPORT OF MRSA BY ANTIGEN DETECTION CALLED TO AND READ BACK BY TOBIAS ROD RN AT 1107 ON 5/29/25. CH.    Susceptibility        Methicillin-Resistant Staphylococcus aureus      BACTERIAL SUSCEPTIBILITY PANEL HUGH      ciprofloxacin >=8 ug/mL Resistant      clindamycin 0.25 ug/mL Sensitive      DAPTOmycin 1 ug/mL Sensitive      doxycycline 2 ug/mL Sensitive      erythromycin >=8 ug/mL Resistant      gentamicin <=0.5 ug/mL Sensitive      levofloxacin 4 ug/mL Intermediate      linezolid 2 ug/mL Sensitive      oxacillin >=4 ug/mL Resistant      tetracycline >=16 ug/mL Resistant      trimethoprim-sulfamethoxazole >=320 ug/mL Resistant      vancomycin 1 ug/mL Sensitive                           Culture with Smear, Acid Fast Bacillius [[id number]] Collected: 05/27/25 1430    Order Status: Completed Specimen: Miscellaneous sample Updated: 05/30/25 1736     Sample Site PLEURAL FLUID        AFB SPECIMEN PROCESSING Concentration     AFB Smear Negative        Comment: (NOTE)  Performed At: [address] [id number]  Marquise Link MD Ph:[phone]          Acid Fast Culture PENDING    Culture, Fungus [[id number]] Collected: 05/27/25 1430    Order Status: Completed Specimen: Body Fluid Updated: 06/02/25 1116     Special Requests PLEURAL FLUID        Culture NO FUNGUS ISOLATED 6 
5 mg IntraPLEUral Q12H Franck Clark MD   5 mg at 06/01/25 0745    ketorolac (TORADOL) injection 15 mg  15 mg IntraVENous Q6H PRN Franck Clark MD   15 mg at 05/31/25 2129    loperamide (IMODIUM) capsule 2 mg  2 mg Oral 4x Daily PRN Franck Clark MD        linezolid (ZYVOX) tablet 600 mg  600 mg Oral 2 times per day Janice Robnis, DO   600 mg at 06/01/25 1015    sodium chloride flush 0.9 % injection 5-40 mL  5-40 mL IntraVENous 2 times per day Melany Rodney DO   10 mL at 06/01/25 1020    sodium chloride flush 0.9 % injection 5-40 mL  5-40 mL IntraVENous PRN Melany Rodney DO        0.9 % sodium chloride infusion   IntraVENous PRN Melany Rodney DO        potassium chloride (KLOR-CON M) extended release tablet 40 mEq  40 mEq Oral PRN Melany Rodney, DO   40 mEq at 05/31/25 0623    Or    potassium bicarb-citric acid (EFFER-K) effervescent tablet 40 mEq  40 mEq Oral PRN Melany Rodney DO        Or    potassium chloride 10 mEq/100 mL IVPB (Peripheral Line)  10 mEq IntraVENous PRN Margi-Melany Spencer DO        magnesium sulfate 2000 mg in 50 mL IVPB premix  2,000 mg IntraVENous PRN Melany Rodney DO        enoxaparin (LOVENOX) injection 40 mg  40 mg SubCUTAneous Daily Melany Rodney DO   40 mg at 06/01/25 1017    ondansetron (ZOFRAN-ODT) disintegrating tablet 4 mg  4 mg Oral Q8H PRN Melany Rodney DO        Or    ondansetron (ZOFRAN) injection 4 mg  4 mg IntraVENous Q6H PRN Melany Rodney DO        polyethylene glycol (GLYCOLAX) packet 17 g  17 g Oral Daily PRN Melany Rodney DO        acetaminophen (TYLENOL) tablet 650 mg  650 mg Oral Q6H PRN Melany Rodney DO   650 mg at 05/29/25 2221    Or    acetaminophen (TYLENOL) suppository 650 mg  650 mg Rectal Q6H PRN Melany Rodney DO        traMADol 
Facility-Administered Medications   Medication Dose Route Frequency Provider Last Rate Last Admin    ALTEplase (CATHFLO) 10 mg in sodium chloride (PF) 0.9 % 30 mL  10 mg IntraPLEUral Q12H Julito Chin MD   10 mg at 06/03/25 0542    And    dornase alpha (PULMOZYME) 5 mg in sterile water 30 mL  5 mg IntraPLEUral Q12H Julito Chin MD   5 mg at 06/03/25 0542    ketorolac (TORADOL) injection 15 mg  15 mg IntraVENous Q6H PRN Franck Clark MD   15 mg at 06/02/25 2039    loperamide (IMODIUM) capsule 2 mg  2 mg Oral 4x Daily PRN Franck Clark MD        linezolid (ZYVOX) tablet 600 mg  600 mg Oral 2 times per day Janice Robins DO   600 mg at 06/03/25 0833    sodium chloride flush 0.9 % injection 5-40 mL  5-40 mL IntraVENous 2 times per day Melany Rodney, DO   10 mL at 06/03/25 0833    sodium chloride flush 0.9 % injection 5-40 mL  5-40 mL IntraVENous PRN Melany Rodney, DO        0.9 % sodium chloride infusion   IntraVENous PRN Margi-ChrisidrMelany, DO        potassium chloride (KLOR-CON M) extended release tablet 40 mEq  40 mEq Oral PRN Margi-Melany Spencer, DO   40 mEq at 05/31/25 0623    Or    potassium bicarb-citric acid (EFFER-K) effervescent tablet 40 mEq  40 mEq Oral PRN Margi-ChrisidMelany viera, DO        Or    potassium chloride 10 mEq/100 mL IVPB (Peripheral Line)  10 mEq IntraVENous PRN Margi-ChrisidrMelany, DO        magnesium sulfate 2000 mg in 50 mL IVPB premix  2,000 mg IntraVENous PRN LaverneidMelany viera, DO        enoxaparin (LOVENOX) injection 40 mg  40 mg SubCUTAneous Daily Melany Rodney, DO   40 mg at 06/03/25 0833    ondansetron (ZOFRAN-ODT) disintegrating tablet 4 mg  4 mg Oral Q8H PRN Melany Rodney DO        Or    ondansetron (ZOFRAN) injection 4 mg  4 mg IntraVENous Q6H PRN Melany Rodney,         polyethylene glycol (GLYCOLAX) packet 17 g  17 g Oral 
Melany Rodney, DO   50 mg at 05/26/25 1257    lactobacillus (CULTURELLE) capsule 1 capsule  1 capsule Oral Daily with breakfast Melany Rodney, DO   1 capsule at 05/29/25 0919    pantoprazole (PROTONIX) tablet 40 mg  40 mg Oral QAM AC Melany Rodney, DO   40 mg at 05/30/25 0554    levothyroxine (SYNTHROID) tablet 200 mcg  200 mcg Oral Daily LaverneidMelany viera, DO   200 mcg at 05/30/25 0554    meclizine (ANTIVERT) tablet 25 mg  25 mg Oral Q8H PRN Melany Rodney, DO        multivitamin 1 tablet  1 tablet Oral Daily Melany Rodney, DO   1 tablet at 05/29/25 0919    rosuvastatin (CRESTOR) tablet 10 mg  10 mg Oral Daily Melany Rodney, DO   10 mg at 05/29/25 0919    ketorolac (TORADOL) injection 15 mg  15 mg IntraVENous Q6H PRN Franck Clark MD   15 mg at 05/28/25 2124     Imaging:  I have personally reviewed the patient’s radiographs and have reviewed the reports:    US THORACENTESIS Which side should the procedure be performed? Left  Narrative: THIS PROCEDURE WAS PERFORMED ENTIRELY BY A PHYSICIAN ASSISTANT    Procedure: Ultrasound-guided thoracentesis    : Darlin Parker PA-C    Attending physician: Dr. Moctezuma     Assistant: None    Preoperative diagnosis: Left pleural effusion     Postoperative diagnosis: Same     Specimen:  Pleural fluid    Estimated blood loss: Minimal    Complications: None    Anesthesia: 1% lidocaine local    Findings:  After informed consent was obtained the patient was prepped and  draped in a normal fashion.  Ultrasound was used to localize the pocket of fluid  and the skin was marked accordingly. The skin was cleansed and anesthetized and  maximum sterile barrier technique was used. A sterile drape was applied. A  22-gauge needle was advanced into the pleural space with return of tan/pink  fluid. A 5 Turkmen needle catheter was advanced into the pleural space and 70 cc  of 
cardiomediastinal silhouette is  normal.  The bones and soft tissues are unremarkable except for skin clips at  the lateral left base.  Impression: Left base tube thoracostomy is unchanged.     Persistent somewhat loculated pneumothorax in the lateral upper left chest and  persistent relatively peripheral basilar left lung opacities and elevation left  hemidiaphragm.    Right lung is grossly clear except for possible subtle increased interstitial  opacities in the medial upper lung.    Electronically signed by Shawn Castañeda    CT 06/04 (See above)      CT-A 05/24          CXR 06/04, day 7 post chest tube placement/intrapleural lytic treatments (L) vs 05/27 (R)         High complexity decision making was performed during the evaluation of this patient at high risk for decompensation with multiple organ involvement     I have personally reviewed all pertinent data including labs, imaging and recommendations of treatment team providers. More than 50% of time was spent in counseling and coordination of care including review of data and discussion with other team members. This time was independent of other practitioners.    SHON time:     Masood Mcelroy, PhD., PA-C.  9:09 AM  Pulmonary, Critical Care Medicine  [facility]            
decompensation with multiple organ involvement     I have personally reviewed all pertinent data including labs, imaging and recommendations of treatment team providers. More than 50% of time was spent in counseling and coordination of care including review of data and discussion with other team members. This time was independent of other practitioners.    SHON time:     Masood Mcelroy, PhD., PA-C.  8:38 AM  Pulmonary, Critical Care Medicine  [facility]            
wall thickness. Normal wall motion.   Indeterminate diastolic function due to E-A fusion.    Right Ventricle: Right ventricle size is within the upper limits of   normal. Normal systolic function. TAPSE is normal. TDI systolic excursion   is normal.    Tricuspid Valve: Trace regurgitation. Normal RVSP. RVSP is 29 mmHg.    Aortic Valve: No stenosis.    Mitral Valve: No regurgitation.                 High complexity decision making was performed during the evaluation of this patient at high risk for decompensation with multiple organ involvement     I have personally reviewed all pertinent data including labs, imaging and recommendations of treatment team providers. More than 50% of time was spent in counseling and coordination of care including review of data and discussion with other team members. This time was independent of other practitioners.    SHON Time:      05/28/25  Masood Mcelroy, PhD/PA-C  [facility]  Pulmonary, Critical Care       
lung base with RIGHT lateral  pneumothorax.  2.  Similar LEFT hemithorax pleural-parenchymal opacities.         Electronically signed by Deepthi Sotomayor  XR CHEST PORTABLE  Narrative: EXAM: XR CHEST PORTABLE    CLINICAL INDICATION/HISTORY : Provided with order: chest tube.    COMPARISON: July 4, 2025    TECHNIQUE: 1 VIEWS    FINDINGS:  EKG leads and wires overlie the chest. Left pleural tube is similar. Similar  LEFT lateral pneumothorax and other pleural parenchymal opacities on the LEFT.  Heart is normal in size     Impression: 1.  Stable exam         Electronically signed by Deepthi Sotomayor    CT 06/04 (See above)      CT-A 05/24          CXR 06/04, day 7 post chest tube placement/intrapleural lytic treatments (L) vs 05/27 (R)         High complexity decision making was performed during the evaluation of this patient at high risk for decompensation with multiple organ involvement     I have personally reviewed all pertinent data including labs, imaging and recommendations of treatment team providers. More than 50% of time was spent in counseling and coordination of care including review of data and discussion with other team members. This time was independent of other practitioners.    SHON time:     Masood Mcelroy, PhD., PA-C.  12:37 PM  Pulmonary, Critical Care Medicine  [facility]

## 2025-06-10 NOTE — DISCHARGE SUMMARY
should seek medical evaluation.  If you should experience fevers, chills, nausea with vomiting, shortness of breath, faintness, loss of consciousness, and/or confusion, you should seek medical evaluation.  Follow-up with [facility]'s Cardiothoracic Surgeon to evaluate your Video-Assisted Thoracoscopic Surgery (a.k.a. VATS) and remove surgical staples (if still present).        Carbon copy to Patient's Primary Care Physician (a.k.a. PCP) ([email or web address]  Discharge Planning took 58 minutes.

## 2025-06-16 LAB
BACTERIA SPEC CULT: NORMAL
SERVICE CMNT-IMP: NORMAL

## 2025-06-17 ENCOUNTER — HOSPITAL ENCOUNTER (OUTPATIENT)
Age: 75
Setting detail: SPECIMEN
Discharge: HOME | End: 2025-06-20
Payer: MEDICARE

## 2025-06-17 LAB
ANION GAP SERPL CALC-SCNC: 13 MMOL/L
BASOPHILS # BLD: 0.03 K/UL (ref 0–0.1)
BASOPHILS NFR BLD: 0.4 % (ref 0–2)
BUN SERPL-MCNC: 16 MG/DL (ref 6–23)
BUN/CREAT SERPL: 26
CA-I BLD-MCNC: 9.6 MG/DL (ref 8.5–10.1)
CHLORIDE SERPL-SCNC: 101 MMOL/L (ref 98–107)
CO2 SERPL-SCNC: 25 MMOL/L (ref 21–32)
CREAT SERPL-MCNC: 0.62 MG/DL (ref 0.6–1.3)
DIFFERENTIAL METHOD BLD: ABNORMAL
EOSINOPHIL # BLD: 0.19 K/UL (ref 0–0.4)
EOSINOPHIL NFR BLD: 2.8 % (ref 0–5)
ERYTHROCYTE [DISTWIDTH] IN BLOOD BY AUTOMATED COUNT: 17.9 % (ref 11.6–14.5)
GLUCOSE SERPL-MCNC: 97 MG/DL (ref 74–108)
HCT VFR BLD AUTO: 29.2 % (ref 35–45)
HGB BLD-MCNC: 8.8 G/DL (ref 12–16)
IMM GRANULOCYTES # BLD AUTO: 0.03 K/UL (ref 0–0.04)
IMM GRANULOCYTES NFR BLD AUTO: 0.4 % (ref 0–0.5)
LYMPHOCYTES # BLD: 0.78 K/UL (ref 0.9–3.6)
LYMPHOCYTES NFR BLD: 11.4 % (ref 21–52)
MCH RBC QN AUTO: 26.8 PG (ref 24–34)
MCHC RBC AUTO-ENTMCNC: 30.1 G/DL (ref 31–37)
MCV RBC AUTO: 89 FL (ref 78–100)
MONOCYTES # BLD: 0.33 K/UL (ref 0.05–1.2)
MONOCYTES NFR BLD: 4.8 % (ref 3–10)
NEUTS SEG # BLD: 5.49 K/UL (ref 1.8–8)
NEUTS SEG NFR BLD: 80.2 % (ref 40–73)
NRBC # BLD: 0 K/UL (ref 0–0.01)
NRBC BLD-RTO: 0 PER 100 WBC
PLATELET # BLD AUTO: 213 K/UL (ref 135–420)
PMV BLD AUTO: 10.5 FL (ref 9.2–11.8)
POTASSIUM SERPL-SCNC: 3.5 MMOL/L (ref 3.5–5.5)
RBC # BLD AUTO: 3.28 M/UL (ref 4.2–5.3)
SODIUM SERPL-SCNC: 139 MMOL/L (ref 136–145)
WBC # BLD AUTO: 6.9 K/UL (ref 4.6–13.2)

## 2025-06-17 PROCEDURE — 36415 COLL VENOUS BLD VENIPUNCTURE: CPT

## 2025-06-17 PROCEDURE — 85025 COMPLETE CBC W/AUTO DIFF WBC: CPT

## 2025-06-17 PROCEDURE — 80048 BASIC METABOLIC PNL TOTAL CA: CPT

## 2025-06-18 ENCOUNTER — HOSPITAL ENCOUNTER (OUTPATIENT)
Age: 75
Setting detail: SPECIMEN
Discharge: HOME | End: 2025-06-21
Payer: MEDICARE

## 2025-06-18 PROBLEM — Z22.322 MRSA (METHICILLIN RESISTANT STAPH AUREUS) CULTURE POSITIVE: Status: ACTIVE | Noted: 2025-06-18

## 2025-06-18 LAB
APPEARANCE UR: CLEAR
BACTERIA URNS QL MICRO: NEGATIVE /HPF
BILIRUB UR QL: NEGATIVE
COLOR UR: YELLOW
EPITH CASTS URNS QL MICRO: ABNORMAL /LPF (ref 0–20)
GLUCOSE UR STRIP.AUTO-MCNC: NEGATIVE MG/DL
HGB UR QL STRIP: NEGATIVE
KETONES UR QL STRIP.AUTO: NEGATIVE MG/DL
LEUKOCYTE ESTERASE UR QL STRIP.AUTO: ABNORMAL
NITRITE UR QL STRIP.AUTO: NEGATIVE
PH UR STRIP: 6.5 (ref 5–8)
PROT UR STRIP-MCNC: ABNORMAL MG/DL
RBC #/AREA URNS HPF: ABNORMAL /HPF (ref 0–2)
SP GR UR REFRACTOMETRY: 1.02 (ref 1–1.03)
UROBILINOGEN UR QL STRIP.AUTO: 0.2 EU/DL (ref 0.2–1)
WBC URNS QL MICRO: ABNORMAL /HPF (ref 0–4)

## 2025-06-18 PROCEDURE — 81001 URINALYSIS AUTO W/SCOPE: CPT

## 2025-06-18 PROCEDURE — 87086 URINE CULTURE/COLONY COUNT: CPT

## 2025-06-19 ENCOUNTER — HOSPITAL ENCOUNTER (OUTPATIENT)
Age: 75
Discharge: HOME | End: 2025-06-22
Payer: MEDICARE

## 2025-06-19 ENCOUNTER — TRANSCRIBE ORDERS (OUTPATIENT)
Age: 75
End: 2025-06-19
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
## 2025-06-19 LAB
BACTERIA SPEC CULT: NORMAL
Lab: NORMAL

## 2025-06-19 PROCEDURE — 73100 X-RAY EXAM OF WRIST: CPT

## 2025-06-19 PROCEDURE — 73620 X-RAY EXAM OF FOOT: CPT

## 2025-06-23 LAB
BACTERIA SPEC CULT: NORMAL
SERVICE CMNT-IMP: NORMAL

## 2025-06-30 LAB
BACTERIA SPEC CULT: NORMAL
SERVICE CMNT-IMP: NORMAL

## 2025-07-11 RX ORDER — CALCIUM CARBONATE 300MG(750)
TABLET,CHEWABLE ORAL
COMMUNITY
End: 2025-07-17 | Stop reason: ALTCHOICE

## 2025-07-11 RX ORDER — ACETAMINOPHEN 325 MG/1
650 TABLET ORAL EVERY 4 HOURS PRN
COMMUNITY
Start: 2025-05-19

## 2025-07-11 RX ORDER — LANOLIN ALCOHOL/MO/W.PET/CERES
CREAM (GRAM) TOPICAL
COMMUNITY

## 2025-07-11 RX ORDER — DEXTROAMPHETAMINE SULFATE 30 MG/1
1 TABLET ORAL
COMMUNITY
End: 2025-07-19 | Stop reason: ALTCHOICE

## 2025-07-11 RX ORDER — OMEPRAZOLE 40 MG/1
40 CAPSULE, DELAYED RELEASE ORAL
COMMUNITY
Start: 2025-05-20 | End: 2025-07-19

## 2025-07-11 RX ORDER — NIFEDIPINE 10 MG/1
CAPSULE ORAL
COMMUNITY
End: 2025-07-17 | Stop reason: ALTCHOICE

## 2025-07-11 RX ORDER — SACCHAROMYCES BOULARDII 250 MG
1 CAPSULE ORAL
COMMUNITY

## 2025-07-12 LAB
ACID FAST STN SPEC: NEGATIVE
MYCOBACTERIUM SPEC QL CULT: NEGATIVE
SPECIMEN PREPARATION: NORMAL
SPECIMEN SOURCE: NORMAL

## 2025-07-17 ENCOUNTER — HOSPITAL ENCOUNTER (OUTPATIENT)
Age: 75
Setting detail: SPECIMEN
Discharge: HOME | End: 2025-07-20
Payer: MEDICARE

## 2025-07-17 ENCOUNTER — OFFICE VISIT (OUTPATIENT)
Facility: CLINIC | Age: 75
End: 2025-07-17
Payer: MEDICARE

## 2025-07-17 VITALS
HEIGHT: 65 IN | DIASTOLIC BLOOD PRESSURE: 83 MMHG | SYSTOLIC BLOOD PRESSURE: 136 MMHG | HEART RATE: 85 BPM | WEIGHT: 110 LBS | OXYGEN SATURATION: 97 % | BODY MASS INDEX: 18.33 KG/M2 | TEMPERATURE: 98.1 F | RESPIRATION RATE: 22 BRPM

## 2025-07-17 DIAGNOSIS — E78.2 MIXED HYPERLIPIDEMIA: ICD-10-CM

## 2025-07-17 DIAGNOSIS — D64.9 NORMOCYTIC ANEMIA: ICD-10-CM

## 2025-07-17 DIAGNOSIS — E87.1 HYPONATREMIA: ICD-10-CM

## 2025-07-17 DIAGNOSIS — E03.9 ACQUIRED HYPOTHYROIDISM: ICD-10-CM

## 2025-07-17 DIAGNOSIS — R42 VERTIGO: ICD-10-CM

## 2025-07-17 DIAGNOSIS — I10 ESSENTIAL HYPERTENSION: ICD-10-CM

## 2025-07-17 DIAGNOSIS — Z76.89 ENCOUNTER TO ESTABLISH CARE: ICD-10-CM

## 2025-07-17 DIAGNOSIS — G47.52 REM SLEEP BEHAVIOR DISORDER: ICD-10-CM

## 2025-07-17 DIAGNOSIS — E43 SEVERE PROTEIN-CALORIE MALNUTRITION: ICD-10-CM

## 2025-07-17 DIAGNOSIS — Z09 FOLLOW-UP EXAMINATION: Primary | ICD-10-CM

## 2025-07-17 DIAGNOSIS — J90 RECURRENT LEFT PLEURAL EFFUSION: ICD-10-CM

## 2025-07-17 DIAGNOSIS — F41.1 GENERALIZED ANXIETY DISORDER: ICD-10-CM

## 2025-07-17 DIAGNOSIS — Z90.01 HISTORY OF ENUCLEATION OF RIGHT EYEBALL: ICD-10-CM

## 2025-07-17 DIAGNOSIS — F90.0 ATTENTION-DEFICIT HYPERACTIVITY DISORDER, PREDOMINANTLY INATTENTIVE TYPE: ICD-10-CM

## 2025-07-17 PROBLEM — I73.00 RAYNAUD'S DISEASE: Status: ACTIVE | Noted: 2025-07-17

## 2025-07-17 LAB
ALBUMIN SERPL-MCNC: 3.1 G/DL (ref 3.4–5)
ALBUMIN/GLOB SERPL: 0.7
ALP SERPL-CCNC: 199 U/L (ref 45–117)
ALT SERPL-CCNC: 35 U/L (ref 10–35)
ANION GAP SERPL CALC-SCNC: 11 MMOL/L
AST SERPL W P-5'-P-CCNC: 41 U/L (ref 10–38)
BASOPHILS # BLD: 0.05 K/UL (ref 0–0.1)
BASOPHILS NFR BLD: 0.7 % (ref 0–2)
BILIRUB SERPL-MCNC: 0.4 MG/DL (ref 0.2–1)
BUN SERPL-MCNC: 15 MG/DL (ref 6–23)
BUN/CREAT SERPL: 26
CA-I BLD-MCNC: 9.4 MG/DL (ref 8.5–10.1)
CHLORIDE SERPL-SCNC: 97 MMOL/L (ref 98–107)
CO2 SERPL-SCNC: 25 MMOL/L (ref 21–32)
CREAT SERPL-MCNC: 0.58 MG/DL (ref 0.6–1.3)
DIFFERENTIAL METHOD BLD: ABNORMAL
EOSINOPHIL # BLD: 0.09 K/UL (ref 0–0.4)
EOSINOPHIL NFR BLD: 1.2 % (ref 0–5)
ERYTHROCYTE [DISTWIDTH] IN BLOOD BY AUTOMATED COUNT: 21 % (ref 11.6–14.5)
GLOBULIN SER CALC-MCNC: 4.3 G/DL
GLUCOSE SERPL-MCNC: 86 MG/DL (ref 74–108)
HCT VFR BLD AUTO: 37 % (ref 35–45)
HGB BLD-MCNC: 11.5 G/DL (ref 12–16)
IMM GRANULOCYTES # BLD AUTO: 0.02 K/UL (ref 0–0.04)
IMM GRANULOCYTES NFR BLD AUTO: 0.3 % (ref 0–0.5)
LYMPHOCYTES # BLD: 1.8 K/UL (ref 0.9–3.6)
LYMPHOCYTES NFR BLD: 24 % (ref 21–52)
MAGNESIUM SERPL-MCNC: 2.2 MG/DL (ref 1.6–2.6)
MCH RBC QN AUTO: 27.3 PG (ref 24–34)
MCHC RBC AUTO-ENTMCNC: 31.1 G/DL (ref 31–37)
MCV RBC AUTO: 87.9 FL (ref 78–100)
MONOCYTES # BLD: 0.43 K/UL (ref 0.05–1.2)
MONOCYTES NFR BLD: 5.7 % (ref 3–10)
NEUTS SEG # BLD: 5.11 K/UL (ref 1.8–8)
NEUTS SEG NFR BLD: 68.1 % (ref 40–73)
NRBC # BLD: 0 K/UL (ref 0–0.01)
NRBC BLD-RTO: 0 PER 100 WBC
PLATELET # BLD AUTO: 333 K/UL (ref 135–420)
PMV BLD AUTO: 10 FL (ref 9.2–11.8)
POTASSIUM SERPL-SCNC: 3.8 MMOL/L (ref 3.5–5.5)
PROT SERPL-MCNC: 7.3 G/DL (ref 6.4–8.2)
RBC # BLD AUTO: 4.21 M/UL (ref 4.2–5.3)
SODIUM SERPL-SCNC: 133 MMOL/L (ref 136–145)
TSH, 3RD GENERATION: 9.98 UIU/ML (ref 0.27–4.2)
WBC # BLD AUTO: 7.5 K/UL (ref 4.6–13.2)

## 2025-07-17 PROCEDURE — 1123F ACP DISCUSS/DSCN MKR DOCD: CPT | Performed by: FAMILY MEDICINE

## 2025-07-17 PROCEDURE — 84443 ASSAY THYROID STIM HORMONE: CPT

## 2025-07-17 PROCEDURE — 1159F MED LIST DOCD IN RCRD: CPT | Performed by: FAMILY MEDICINE

## 2025-07-17 PROCEDURE — 1160F RVW MEDS BY RX/DR IN RCRD: CPT | Performed by: FAMILY MEDICINE

## 2025-07-17 PROCEDURE — 3079F DIAST BP 80-89 MM HG: CPT | Performed by: FAMILY MEDICINE

## 2025-07-17 PROCEDURE — 80061 LIPID PANEL: CPT

## 2025-07-17 PROCEDURE — 3075F SYST BP GE 130 - 139MM HG: CPT | Performed by: FAMILY MEDICINE

## 2025-07-17 PROCEDURE — 80053 COMPREHEN METABOLIC PANEL: CPT

## 2025-07-17 PROCEDURE — 83735 ASSAY OF MAGNESIUM: CPT

## 2025-07-17 PROCEDURE — 85025 COMPLETE CBC W/AUTO DIFF WBC: CPT

## 2025-07-17 PROCEDURE — 99204 OFFICE O/P NEW MOD 45 MIN: CPT | Performed by: FAMILY MEDICINE

## 2025-07-17 PROCEDURE — 36415 COLL VENOUS BLD VENIPUNCTURE: CPT

## 2025-07-17 RX ORDER — TROSPIUM CHLORIDE 20 MG/1
TABLET, FILM COATED ORAL
COMMUNITY
Start: 2025-06-11

## 2025-07-17 RX ORDER — ONDANSETRON 4 MG/1
TABLET, FILM COATED ORAL
COMMUNITY
Start: 2025-06-15 | End: 2025-07-17 | Stop reason: ALTCHOICE

## 2025-07-17 ASSESSMENT — PATIENT HEALTH QUESTIONNAIRE - PHQ9
SUM OF ALL RESPONSES TO PHQ QUESTIONS 1-9: 0
1. LITTLE INTEREST OR PLEASURE IN DOING THINGS: NOT AT ALL
SUM OF ALL RESPONSES TO PHQ QUESTIONS 1-9: 0
2. FEELING DOWN, DEPRESSED OR HOPELESS: NOT AT ALL

## 2025-07-17 NOTE — PROGRESS NOTES
Cori Dennis presents today for   Chief Complaint   Patient presents with    New Patient     Patient is here to establish care with provider.       Is someone accompanying this pt? no    Is the patient using any DME equipment during OV? no    Health Maintenance Due   Topic Date Due    Depression Screen  Never done    Pneumococcal 50+ years Vaccine (1 of 2 - PCV) 04/06/1969    DEXA (modify frequency per FRAX score)  Never done    Respiratory Syncytial Virus (RSV) Pregnant or age 60 yrs+ (1 - 1-dose 75+ series) Never done    Annual Wellness Visit (Medicare)  Never done    COVID-19 Vaccine (7 - 2024-25 season) 03/08/2025    Lipids  06/13/2025         \"Have you been to the ER, urgent care clinic since your last visit?  Hospitalized since your last visit?\"    YES - When: approximately 4 months ago.  Where and Why: [facility].    “Have you seen or consulted any other health care providers outside our system since your last visit?”    NO           
swelling.   Gastrointestinal:  Negative for abdominal pain, diarrhea, nausea and vomiting.   Endocrine: Negative.    Genitourinary: Negative.    Musculoskeletal: Negative.    Skin:  Negative for rash.   Neurological:  Positive for dizziness (chronic). Negative for seizures, syncope, weakness and headaches.   Hematological: Negative.    Psychiatric/Behavioral:  Positive for sleep disturbance (Reports REM sleep disorder). Negative for suicidal ideas.         Anxiety stable. Denies depression        Objective:   /83 (BP Site: Left Upper Arm, Patient Position: Sitting, BP Cuff Size: Medium Adult)   Pulse 85   Temp 98.1 °F (36.7 °C) (Oral)   Resp 22   Ht 1.651 m (5' 5\")   Wt 49.9 kg (110 lb)   SpO2 97%   BMI 18.30 kg/m²       Physical Exam  Vitals and nursing note reviewed.   Constitutional:       General: She is not in acute distress.     Appearance: Normal appearance. She is not ill-appearing or toxic-appearing.      Comments: underweight   HENT:      Head: Normocephalic and atraumatic.      Nose: Nose normal.      Mouth/Throat:      Mouth: Mucous membranes are moist.      Pharynx: Oropharynx is clear. No posterior oropharyngeal erythema.   Eyes:      General: No scleral icterus.     Comments: Right eye absent   Cardiovascular:      Rate and Rhythm: Normal rate and regular rhythm.      Pulses: Normal pulses.      Heart sounds: Normal heart sounds.   Pulmonary:      Effort: Pulmonary effort is normal.      Breath sounds: Normal breath sounds.   Abdominal:      Palpations: Abdomen is soft. There is no mass.      Tenderness: There is no abdominal tenderness. There is no guarding.   Musculoskeletal:      Cervical back: Normal range of motion and neck supple.      Right lower leg: No edema.      Left lower leg: No edema.   Skin:     General: Skin is warm and dry.      Findings: No rash.   Neurological:      General: No focal deficit present.      Mental Status: She is alert and oriented to person, place, and

## 2025-07-17 NOTE — ASSESSMENT & PLAN NOTE
Chronic, self-diagnosed- Neuro referral eval and treat.   Orders:    Regino Dalal MD, Neurology, [place] ([facility])

## 2025-07-17 NOTE — ASSESSMENT & PLAN NOTE
Chronic, not at goal (unstable), Continue Levothyroxin 200mcg every morning on empty stomach with water. Dose adjustment pending labs results.   Orders:    TSH; Future

## 2025-07-17 NOTE — ASSESSMENT & PLAN NOTE
Chronic, at goal (stable), Rosuvastatin on hold right now. Resume pending lab results as ordered  Orders:    Lipid Panel; Future

## 2025-07-17 NOTE — ASSESSMENT & PLAN NOTE
Chronic, at goal (stable), Off meds. Labs per order  Orders:    Magnesium; Future    Comprehensive Metabolic Panel; Future

## 2025-07-18 LAB
CHOLEST SERPL-MCNC: 160 MG/DL
HDLC SERPL-MCNC: 68 MG/DL (ref 40–60)
HDLC SERPL: 2.3 (ref 0–5)
LDLC SERPL CALC-MCNC: 76 MG/DL (ref 0–100)
TRIGL SERPL-MCNC: 79 MG/DL (ref 0–150)
VLDLC SERPL CALC-MCNC: 16 MG/DL

## 2025-07-19 ASSESSMENT — ENCOUNTER SYMPTOMS
VOMITING: 0
DIARRHEA: 0
NAUSEA: 0
SHORTNESS OF BREATH: 0
ABDOMINAL PAIN: 0

## 2025-07-21 ENCOUNTER — RESULTS FOLLOW-UP (OUTPATIENT)
Facility: CLINIC | Age: 75
End: 2025-07-21

## 2025-07-21 DIAGNOSIS — E03.9 ACQUIRED HYPOTHYROIDISM: Primary | ICD-10-CM

## 2025-07-21 NOTE — TELEPHONE ENCOUNTER
Called patient to speak about message from Luz Maria Olivares NP. Confirmed name and date of birth. Spoke with patient about message. Patient expressed understanding.

## 2025-07-21 NOTE — RESULT ENCOUNTER NOTE
Call: anemia has much improved HGB 11.5 which is almost normal limits. NO white count for infection. Kidneys, mag good. Liver enzymes are okay-AST is minimally elevated 41. Will monitor with routine labs. Nutritional status is looking better. Lipids good. Thyroid levels are running low. Make sure taking Levothyroxin every morning on an empty stomach with water. Repeat levels in 6 weeks. Future order sent to hospital.

## 2025-07-21 NOTE — TELEPHONE ENCOUNTER
----- Message from AKIL Vang sent at 7/21/2025 12:42 PM EDT -----  Call: anemia has much improved HGB 11.5 which is almost normal limits. NO white count for infection. Kidneys, mag good. Liver enzymes are okay-AST is minimally elevated 41. Will monitor with routine labs. Nutritional status is looking better. Lipids good. Thyroid levels are running low. Make sure taking Levothyroxin every morning on an empty stomach with water. Repeat levels in 6 weeks. Future order sent to hospital.

## 2025-08-20 ENCOUNTER — OFFICE VISIT (OUTPATIENT)
Facility: CLINIC | Age: 75
End: 2025-08-20
Payer: MEDICARE

## 2025-08-20 ENCOUNTER — HOSPITAL ENCOUNTER (OUTPATIENT)
Age: 75
Discharge: HOME | End: 2025-08-23
Payer: MEDICARE

## 2025-08-20 VITALS
HEIGHT: 65 IN | BODY MASS INDEX: 18.83 KG/M2 | OXYGEN SATURATION: 99 % | HEART RATE: 91 BPM | RESPIRATION RATE: 22 BRPM | WEIGHT: 113 LBS | SYSTOLIC BLOOD PRESSURE: 121 MMHG | DIASTOLIC BLOOD PRESSURE: 77 MMHG | TEMPERATURE: 97.8 F

## 2025-08-20 DIAGNOSIS — E03.9 ACQUIRED HYPOTHYROIDISM: ICD-10-CM

## 2025-08-20 DIAGNOSIS — E53.8 B12 DEFICIENCY: ICD-10-CM

## 2025-08-20 DIAGNOSIS — R39.81 FUNCTIONAL URINARY INCONTINENCE: ICD-10-CM

## 2025-08-20 DIAGNOSIS — R26.89 BALANCE DISORDER: ICD-10-CM

## 2025-08-20 DIAGNOSIS — N64.4 BREAST PAIN IN FEMALE: Primary | ICD-10-CM

## 2025-08-20 DIAGNOSIS — E55.9 VITAMIN D DEFICIENCY: ICD-10-CM

## 2025-08-20 DIAGNOSIS — K21.9 GASTROESOPHAGEAL REFLUX DISEASE WITHOUT ESOPHAGITIS: ICD-10-CM

## 2025-08-20 PROCEDURE — 3074F SYST BP LT 130 MM HG: CPT | Performed by: FAMILY MEDICINE

## 2025-08-20 PROCEDURE — 84443 ASSAY THYROID STIM HORMONE: CPT

## 2025-08-20 PROCEDURE — 84439 ASSAY OF FREE THYROXINE: CPT

## 2025-08-20 PROCEDURE — 86376 MICROSOMAL ANTIBODY EACH: CPT

## 2025-08-20 PROCEDURE — 1123F ACP DISCUSS/DSCN MKR DOCD: CPT | Performed by: FAMILY MEDICINE

## 2025-08-20 PROCEDURE — 99215 OFFICE O/P EST HI 40 MIN: CPT | Performed by: FAMILY MEDICINE

## 2025-08-20 PROCEDURE — 36415 COLL VENOUS BLD VENIPUNCTURE: CPT

## 2025-08-20 PROCEDURE — 3078F DIAST BP <80 MM HG: CPT | Performed by: FAMILY MEDICINE

## 2025-08-20 RX ORDER — DEXTROAMPHETAMINE SULFATE 30 MG/1
TABLET ORAL
COMMUNITY
End: 2025-08-24 | Stop reason: ALTCHOICE

## 2025-08-20 RX ORDER — CALCIUM CARBONATE 300MG(750)
TABLET,CHEWABLE ORAL
COMMUNITY

## 2025-08-20 RX ORDER — ERGOCALCIFEROL 1.25 MG/1
1 CAPSULE ORAL WEEKLY
Qty: 12 CAPSULE | Refills: 0 | Status: SHIPPED | OUTPATIENT
Start: 2025-08-20

## 2025-08-20 RX ORDER — SACCHAROMYCES BOULARDII 250 MG
250 CAPSULE ORAL 2 TIMES DAILY
Qty: 180 CAPSULE | Refills: 1 | Status: SHIPPED | OUTPATIENT
Start: 2025-08-20

## 2025-08-20 RX ORDER — LANOLIN ALCOHOL/MO/W.PET/CERES
1000 CREAM (GRAM) TOPICAL DAILY
Qty: 90 TABLET | Refills: 1 | Status: SHIPPED | OUTPATIENT
Start: 2025-08-20

## 2025-08-20 RX ORDER — SOLIFENACIN SUCCINATE 10 MG/1
10 TABLET, FILM COATED ORAL DAILY
Qty: 90 TABLET | Refills: 1 | Status: SHIPPED | OUTPATIENT
Start: 2025-08-20

## 2025-08-20 RX ORDER — NIFEDIPINE 10 MG/1
CAPSULE ORAL
COMMUNITY
End: 2025-08-20 | Stop reason: ALTCHOICE

## 2025-08-20 RX ORDER — PANTOPRAZOLE SODIUM 40 MG/1
40 TABLET, DELAYED RELEASE ORAL DAILY
Qty: 90 TABLET | Refills: 1 | Status: SHIPPED | OUTPATIENT
Start: 2025-08-20

## 2025-08-20 RX ORDER — MECLIZINE HCL 12.5 MG 12.5 MG/1
12.5 TABLET ORAL 3 TIMES DAILY PRN
Qty: 90 TABLET | Refills: 1 | Status: SHIPPED | OUTPATIENT
Start: 2025-08-20

## 2025-08-20 ASSESSMENT — PATIENT HEALTH QUESTIONNAIRE - PHQ9
1. LITTLE INTEREST OR PLEASURE IN DOING THINGS: NOT AT ALL
SUM OF ALL RESPONSES TO PHQ QUESTIONS 1-9: 0
2. FEELING DOWN, DEPRESSED OR HOPELESS: NOT AT ALL

## 2025-08-20 ASSESSMENT — ENCOUNTER SYMPTOMS
EYES NEGATIVE: 1
COUGH: 0
VOMITING: 0
NAUSEA: 1
CONSTIPATION: 0
SHORTNESS OF BREATH: 0
WHEEZING: 0
ABDOMINAL PAIN: 0
CHEST TIGHTNESS: 0
BLOOD IN STOOL: 0
DIARRHEA: 0

## 2025-08-22 LAB — TSH SERPL DL<=0.05 MIU/L-ACNC: 8.48 UIU/ML (ref 0.45–4.5)

## 2025-08-24 PROBLEM — K21.00 GASTROESOPHAGEAL REFLUX DISEASE WITH ESOPHAGITIS: Status: ACTIVE | Noted: 2025-08-24

## 2025-08-24 PROBLEM — G47.33 OBSTRUCTIVE SLEEP APNEA (ADULT) (PEDIATRIC): Status: ACTIVE | Noted: 2025-08-24

## 2025-08-25 ENCOUNTER — RESULTS FOLLOW-UP (OUTPATIENT)
Facility: CLINIC | Age: 75
End: 2025-08-25

## 2025-08-27 DIAGNOSIS — R39.81 FUNCTIONAL URINARY INCONTINENCE: ICD-10-CM

## 2025-08-27 DIAGNOSIS — E55.9 VITAMIN D DEFICIENCY: ICD-10-CM

## 2025-08-27 DIAGNOSIS — R26.89 BALANCE DISORDER: ICD-10-CM

## 2025-08-27 RX ORDER — SACCHAROMYCES BOULARDII 250 MG
250 CAPSULE ORAL 2 TIMES DAILY
Qty: 180 CAPSULE | Refills: 1 | Status: SHIPPED | OUTPATIENT
Start: 2025-08-27

## 2025-08-27 RX ORDER — CALCIUM CARBONATE 300MG(750)
400 TABLET,CHEWABLE ORAL DAILY
Qty: 30 TABLET | Refills: 1 | Status: SHIPPED | OUTPATIENT
Start: 2025-08-27

## 2025-08-27 RX ORDER — ERGOCALCIFEROL 1.25 MG/1
1 CAPSULE ORAL WEEKLY
Qty: 12 CAPSULE | Refills: 0 | Status: SHIPPED | OUTPATIENT
Start: 2025-08-27

## 2025-08-27 RX ORDER — MECLIZINE HCL 12.5 MG 12.5 MG/1
12.5 TABLET ORAL 3 TIMES DAILY PRN
Qty: 90 TABLET | Refills: 1 | Status: SHIPPED | OUTPATIENT
Start: 2025-08-27

## 2025-08-27 RX ORDER — SOLIFENACIN SUCCINATE 10 MG/1
10 TABLET, FILM COATED ORAL DAILY
Qty: 90 TABLET | Refills: 1 | Status: SHIPPED | OUTPATIENT
Start: 2025-08-27

## 2025-08-27 RX ORDER — LEVOTHYROXINE SODIUM 200 UG/1
200 TABLET ORAL DAILY
Qty: 30 TABLET | Refills: 1 | Status: SHIPPED | OUTPATIENT
Start: 2025-08-27

## 2025-08-30 ENCOUNTER — APPOINTMENT (OUTPATIENT)
Age: 75
End: 2025-08-30
Payer: MEDICARE

## 2025-08-30 ENCOUNTER — HOSPITAL ENCOUNTER (OUTPATIENT)
Age: 75
Setting detail: OBSERVATION
Discharge: HOME | End: 2025-08-31
Attending: EMERGENCY MEDICINE | Admitting: INTERNAL MEDICINE
Payer: MEDICARE
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
## 2025-08-30 DIAGNOSIS — S22.41XA CLOSED FRACTURE OF MULTIPLE RIBS OF RIGHT SIDE, INITIAL ENCOUNTER: ICD-10-CM

## 2025-08-30 LAB
ALBUMIN SERPL-MCNC: 3.8 G/DL (ref 3.4–5)
ALBUMIN/GLOB SERPL: 0.8
ALP SERPL-CCNC: 128 U/L (ref 45–117)
ALT SERPL-CCNC: 16 U/L (ref 10–35)
ANION GAP SERPL CALC-SCNC: 10 MMOL/L (ref 3–18)
APPEARANCE UR: ABNORMAL
AST SERPL W P-5'-P-CCNC: 22 U/L (ref 10–38)
BACTERIA URNS QL MICRO: ABNORMAL /HPF
BASOPHILS # BLD: 0.03 K/UL (ref 0–0.1)
BASOPHILS NFR BLD: 0.4 % (ref 0–2)
BILIRUB SERPL-MCNC: 0.5 MG/DL (ref 0.2–1)
BILIRUB UR QL: NEGATIVE
BUN SERPL-MCNC: 15 MG/DL (ref 6–23)
BUN/CREAT SERPL: 26
CA-I BLD-MCNC: 10.1 MG/DL (ref 8.5–10.1)
CHLORIDE SERPL-SCNC: 101 MMOL/L (ref 98–107)
CO2 SERPL-SCNC: 28 MMOL/L (ref 21–32)
COLOR UR: YELLOW
CREAT SERPL-MCNC: 0.55 MG/DL (ref 0.6–1.3)
DIFFERENTIAL METHOD BLD: ABNORMAL
EOSINOPHIL # BLD: 0.18 K/UL (ref 0–0.4)
EOSINOPHIL NFR BLD: 2.3 % (ref 0–5)
EPITH CASTS URNS QL MICRO: ABNORMAL /LPF (ref 0–20)
ERYTHROCYTE [DISTWIDTH] IN BLOOD BY AUTOMATED COUNT: 15.4 % (ref 11.6–14.5)
GLOBULIN SER CALC-MCNC: 4.7 G/DL
GLUCOSE SERPL-MCNC: 93 MG/DL (ref 74–108)
GLUCOSE UR STRIP.AUTO-MCNC: NEGATIVE MG/DL
HCT VFR BLD AUTO: 37.4 % (ref 35–45)
HGB BLD-MCNC: 11.4 G/DL (ref 12–16)
HGB UR QL STRIP: NEGATIVE
IMM GRANULOCYTES # BLD AUTO: 0.04 K/UL (ref 0–0.04)
IMM GRANULOCYTES NFR BLD AUTO: 0.5 % (ref 0–0.5)
KETONES UR QL STRIP.AUTO: NEGATIVE MG/DL
LEUKOCYTE ESTERASE UR QL STRIP.AUTO: ABNORMAL
LIPASE SERPL-CCNC: 19 U/L (ref 13–75)
LYMPHOCYTES # BLD: 0.81 K/UL (ref 0.9–3.6)
LYMPHOCYTES NFR BLD: 10.5 % (ref 21–52)
MCH RBC QN AUTO: 27.7 PG (ref 24–34)
MCHC RBC AUTO-ENTMCNC: 30.5 G/DL (ref 31–37)
MCV RBC AUTO: 91 FL (ref 78–100)
MONOCYTES # BLD: 0.57 K/UL (ref 0.05–1.2)
MONOCYTES NFR BLD: 7.4 % (ref 3–10)
NEUTS SEG # BLD: 6.06 K/UL (ref 1.8–8)
NEUTS SEG NFR BLD: 78.9 % (ref 40–73)
NITRITE UR QL STRIP.AUTO: NEGATIVE
NRBC # BLD: 0 K/UL (ref 0–0.01)
NRBC BLD-RTO: 0 PER 100 WBC
PH UR STRIP: 6.5 (ref 5–8)
PLATELET # BLD AUTO: 367 K/UL (ref 135–420)
PMV BLD AUTO: 9.1 FL (ref 9.2–11.8)
POTASSIUM SERPL-SCNC: 3.7 MMOL/L (ref 3.5–5.5)
PROT SERPL-MCNC: 8.5 G/DL (ref 6.4–8.2)
PROT UR STRIP-MCNC: ABNORMAL MG/DL
RBC # BLD AUTO: 4.11 M/UL (ref 4.2–5.3)
RBC #/AREA URNS HPF: ABNORMAL /HPF (ref 0–2)
SODIUM SERPL-SCNC: 139 MMOL/L (ref 136–145)
SP GR UR REFRACTOMETRY: 1.02 (ref 1–1.03)
UROBILINOGEN UR QL STRIP.AUTO: 0.2 EU/DL (ref 0.2–1)
WBC # BLD AUTO: 7.7 K/UL (ref 4.6–13.2)
WBC URNS QL MICRO: ABNORMAL /HPF (ref 0–4)

## 2025-08-30 PROCEDURE — 85025 COMPLETE CBC W/AUTO DIFF WBC: CPT

## 2025-08-30 PROCEDURE — 70486 CT MAXILLOFACIAL W/O DYE: CPT

## 2025-08-30 PROCEDURE — G0378 HOSPITAL OBSERVATION PER HR: HCPCS

## 2025-08-30 PROCEDURE — 80053 COMPREHEN METABOLIC PANEL: CPT

## 2025-08-30 PROCEDURE — 6370000000 HC RX 637 (ALT 250 FOR IP): Performed by: EMERGENCY MEDICINE

## 2025-08-30 PROCEDURE — 74176 CT ABD & PELVIS W/O CONTRAST: CPT

## 2025-08-30 PROCEDURE — 96374 THER/PROPH/DIAG INJ IV PUSH: CPT

## 2025-08-30 PROCEDURE — 73030 X-RAY EXAM OF SHOULDER: CPT

## 2025-08-30 PROCEDURE — 6370000000 HC RX 637 (ALT 250 FOR IP): Performed by: NURSE PRACTITIONER

## 2025-08-30 PROCEDURE — 70450 CT HEAD/BRAIN W/O DYE: CPT

## 2025-08-30 PROCEDURE — 81001 URINALYSIS AUTO W/SCOPE: CPT

## 2025-08-30 PROCEDURE — 83690 ASSAY OF LIPASE: CPT

## 2025-08-30 PROCEDURE — 99285 EMERGENCY DEPT VISIT HI MDM: CPT

## 2025-08-30 PROCEDURE — 6360000002 HC RX W HCPCS: Performed by: EMERGENCY MEDICINE

## 2025-08-30 RX ORDER — MULTIVITAMIN WITH IRON
1000 TABLET ORAL DAILY
Status: DISCONTINUED | OUTPATIENT
Start: 2025-08-30 | End: 2025-08-31 | Stop reason: HOSPADM

## 2025-08-30 RX ORDER — ACETAMINOPHEN 500 MG
1000 TABLET ORAL
Status: COMPLETED | OUTPATIENT
Start: 2025-08-30 | End: 2025-08-30

## 2025-08-30 RX ORDER — HYDROCODONE BITARTRATE AND ACETAMINOPHEN 5; 325 MG/1; MG/1
1 TABLET ORAL EVERY 4 HOURS PRN
Refills: 0 | Status: DISCONTINUED | OUTPATIENT
Start: 2025-08-30 | End: 2025-08-31 | Stop reason: HOSPADM

## 2025-08-30 RX ORDER — TRAMADOL HYDROCHLORIDE 50 MG/1
50 TABLET ORAL EVERY 6 HOURS PRN
Status: DISCONTINUED | OUTPATIENT
Start: 2025-08-30 | End: 2025-08-31 | Stop reason: HOSPADM

## 2025-08-30 RX ORDER — TRAMADOL HYDROCHLORIDE 50 MG/1
100 TABLET ORAL
Status: COMPLETED | OUTPATIENT
Start: 2025-08-30 | End: 2025-08-30

## 2025-08-30 RX ORDER — ERGOCALCIFEROL 1.25 MG/1
50000 CAPSULE, LIQUID FILLED ORAL WEEKLY
Status: DISCONTINUED | OUTPATIENT
Start: 2025-08-31 | End: 2025-08-31 | Stop reason: HOSPADM

## 2025-08-30 RX ORDER — LEVOTHYROXINE SODIUM 100 UG/1
200 TABLET ORAL DAILY
Status: DISCONTINUED | OUTPATIENT
Start: 2025-08-31 | End: 2025-08-31 | Stop reason: HOSPADM

## 2025-08-30 RX ORDER — KETOROLAC TROMETHAMINE 30 MG/ML
15 INJECTION, SOLUTION INTRAMUSCULAR; INTRAVENOUS
Status: COMPLETED | OUTPATIENT
Start: 2025-08-30 | End: 2025-08-30

## 2025-08-30 RX ORDER — VIT C/E/ZN/COPPR/LUTEIN/ZEAXAN 60 MG-6 MG
1 CAPSULE ORAL 2 TIMES DAILY
Status: DISCONTINUED | OUTPATIENT
Start: 2025-08-30 | End: 2025-08-31

## 2025-08-30 RX ORDER — PANTOPRAZOLE SODIUM 40 MG/1
40 TABLET, DELAYED RELEASE ORAL DAILY
Status: DISCONTINUED | OUTPATIENT
Start: 2025-08-30 | End: 2025-08-31 | Stop reason: HOSPADM

## 2025-08-30 RX ADMIN — CYANOCOBALAMIN TAB 500 MCG 1000 MCG: 500 TAB at 16:18

## 2025-08-30 RX ADMIN — KETOROLAC TROMETHAMINE 15 MG: 30 INJECTION, SOLUTION INTRAMUSCULAR at 12:53

## 2025-08-30 RX ADMIN — PANTOPRAZOLE SODIUM 40 MG: 40 TABLET, DELAYED RELEASE ORAL at 16:18

## 2025-08-30 RX ADMIN — TRAMADOL HYDROCHLORIDE 50 MG: 50 TABLET, COATED ORAL at 22:49

## 2025-08-30 RX ADMIN — TRAMADOL HYDROCHLORIDE 100 MG: 50 TABLET, COATED ORAL at 14:25

## 2025-08-30 RX ADMIN — ACETAMINOPHEN 1000 MG: 500 TABLET ORAL at 13:50

## 2025-08-30 ASSESSMENT — PAIN - FUNCTIONAL ASSESSMENT
PAIN_FUNCTIONAL_ASSESSMENT: 0-10
PAIN_FUNCTIONAL_ASSESSMENT: PREVENTS OR INTERFERES SOME ACTIVE ACTIVITIES AND ADLS
PAIN_FUNCTIONAL_ASSESSMENT: 0-10
PAIN_FUNCTIONAL_ASSESSMENT: PREVENTS OR INTERFERES SOME ACTIVE ACTIVITIES AND ADLS

## 2025-08-30 ASSESSMENT — PAIN DESCRIPTION - DESCRIPTORS
DESCRIPTORS: ACHING;SHARP
DESCRIPTORS: SHARP

## 2025-08-30 ASSESSMENT — PAIN DESCRIPTION - LOCATION
LOCATION: RIB CAGE

## 2025-08-30 ASSESSMENT — PAIN SCALES - GENERAL
PAINLEVEL_OUTOF10: 0
PAINLEVEL_OUTOF10: 6
PAINLEVEL_OUTOF10: 4
PAINLEVEL_OUTOF10: 6
PAINLEVEL_OUTOF10: 4
PAINLEVEL_OUTOF10: 6
PAINLEVEL_OUTOF10: 0

## 2025-08-30 ASSESSMENT — LIFESTYLE VARIABLES
HOW OFTEN DO YOU HAVE A DRINK CONTAINING ALCOHOL: NEVER
HOW MANY STANDARD DRINKS CONTAINING ALCOHOL DO YOU HAVE ON A TYPICAL DAY: PATIENT DOES NOT DRINK

## 2025-08-30 ASSESSMENT — PAIN DESCRIPTION - ORIENTATION
ORIENTATION: RIGHT

## 2025-08-30 ASSESSMENT — PAIN DESCRIPTION - FREQUENCY: FREQUENCY: INTERMITTENT

## 2025-08-30 ASSESSMENT — PAIN DESCRIPTION - PAIN TYPE: TYPE: ACUTE PAIN

## 2025-08-31 VITALS
RESPIRATION RATE: 18 BRPM | TEMPERATURE: 98.5 F | DIASTOLIC BLOOD PRESSURE: 78 MMHG | WEIGHT: 113 LBS | HEIGHT: 65 IN | HEART RATE: 78 BPM | BODY MASS INDEX: 18.83 KG/M2 | SYSTOLIC BLOOD PRESSURE: 146 MMHG | OXYGEN SATURATION: 98 %

## 2025-08-31 PROCEDURE — G0378 HOSPITAL OBSERVATION PER HR: HCPCS

## 2025-08-31 PROCEDURE — 6370000000 HC RX 637 (ALT 250 FOR IP): Performed by: NURSE PRACTITIONER

## 2025-08-31 PROCEDURE — 97162 PT EVAL MOD COMPLEX 30 MIN: CPT

## 2025-08-31 PROCEDURE — 6370000000 HC RX 637 (ALT 250 FOR IP): Performed by: INTERNAL MEDICINE

## 2025-08-31 PROCEDURE — 6360000002 HC RX W HCPCS: Performed by: INTERNAL MEDICINE

## 2025-08-31 PROCEDURE — 96374 THER/PROPH/DIAG INJ IV PUSH: CPT

## 2025-08-31 RX ORDER — OXYCODONE HYDROCHLORIDE 5 MG/1
5 TABLET, COATED ORAL EVERY 6 HOURS PRN
Qty: 21 EACH | Refills: 0 | Status: SHIPPED | OUTPATIENT
Start: 2025-08-31 | End: 2025-09-05

## 2025-08-31 RX ORDER — ACETAMINOPHEN 500 MG
1000 TABLET ORAL 3 TIMES DAILY
Qty: 180 TABLET | Refills: 0 | Status: SHIPPED | OUTPATIENT
Start: 2025-08-31 | End: 2025-09-05

## 2025-08-31 RX ORDER — I-VITE, TAB 1000-60-2MG (60/BT) 300MCG-200
1 TAB ORAL 2 TIMES DAILY
Status: DISCONTINUED | OUTPATIENT
Start: 2025-08-31 | End: 2025-08-31 | Stop reason: HOSPADM

## 2025-08-31 RX ORDER — IBUPROFEN 800 MG/1
800 TABLET, FILM COATED ORAL
Qty: 90 TABLET | Refills: 0 | Status: SHIPPED | OUTPATIENT
Start: 2025-08-31

## 2025-08-31 RX ORDER — KETOROLAC TROMETHAMINE 30 MG/ML
15 INJECTION, SOLUTION INTRAMUSCULAR; INTRAVENOUS ONCE
Status: COMPLETED | OUTPATIENT
Start: 2025-08-31 | End: 2025-08-31

## 2025-08-31 RX ADMIN — ERGOCALCIFEROL 50000 UNITS: 1.25 CAPSULE ORAL at 07:40

## 2025-08-31 RX ADMIN — CYANOCOBALAMIN TAB 500 MCG 1000 MCG: 500 TAB at 07:40

## 2025-08-31 RX ADMIN — HYDROCODONE BITARTRATE AND ACETAMINOPHEN 1 TABLET: 5; 325 TABLET ORAL at 06:18

## 2025-08-31 RX ADMIN — PANTOPRAZOLE SODIUM 40 MG: 40 TABLET, DELAYED RELEASE ORAL at 07:40

## 2025-08-31 RX ADMIN — I-VITE, TAB 1000-60-2MG (60/BT) 1 TABLET: TAB at 08:22

## 2025-08-31 RX ADMIN — LEVOTHYROXINE SODIUM 200 MCG: 0.1 TABLET ORAL at 06:17

## 2025-08-31 RX ADMIN — KETOROLAC TROMETHAMINE 15 MG: 30 INJECTION, SOLUTION INTRAMUSCULAR at 09:53

## 2025-08-31 ASSESSMENT — PAIN DESCRIPTION - ORIENTATION
ORIENTATION: RIGHT
ORIENTATION: RIGHT

## 2025-08-31 ASSESSMENT — PAIN - FUNCTIONAL ASSESSMENT
PAIN_FUNCTIONAL_ASSESSMENT: 0-10
PAIN_FUNCTIONAL_ASSESSMENT: PREVENTS OR INTERFERES SOME ACTIVE ACTIVITIES AND ADLS
PAIN_FUNCTIONAL_ASSESSMENT: ACTIVITIES ARE NOT PREVENTED
PAIN_FUNCTIONAL_ASSESSMENT: 0-10

## 2025-08-31 ASSESSMENT — PAIN SCALES - GENERAL
PAINLEVEL_OUTOF10: 5
PAINLEVEL_OUTOF10: 0
PAINLEVEL_OUTOF10: 6
PAINLEVEL_OUTOF10: 4
PAINLEVEL_OUTOF10: 7
PAINLEVEL_OUTOF10: 4

## 2025-08-31 ASSESSMENT — PAIN DESCRIPTION - LOCATION: LOCATION: ABDOMEN

## 2025-08-31 ASSESSMENT — PAIN DESCRIPTION - DESCRIPTORS
DESCRIPTORS: DISCOMFORT
DESCRIPTORS: ACHING

## 2025-09-03 ENCOUNTER — TELEPHONE (OUTPATIENT)
Facility: CLINIC | Age: 75
End: 2025-09-03

## 2025-09-04 ENCOUNTER — TELEPHONE (OUTPATIENT)
Facility: CLINIC | Age: 75
End: 2025-09-04

## 2025-09-05 ENCOUNTER — OFFICE VISIT (OUTPATIENT)
Facility: CLINIC | Age: 75
End: 2025-09-05

## 2025-09-05 VITALS
DIASTOLIC BLOOD PRESSURE: 78 MMHG | RESPIRATION RATE: 18 BRPM | TEMPERATURE: 98.5 F | BODY MASS INDEX: 19.33 KG/M2 | HEART RATE: 99 BPM | OXYGEN SATURATION: 99 % | SYSTOLIC BLOOD PRESSURE: 127 MMHG | WEIGHT: 116 LBS | HEIGHT: 65 IN
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
## 2025-09-05 DIAGNOSIS — S22.41XS CLOSED FRACTURE OF MULTIPLE RIBS OF RIGHT SIDE, SEQUELA: ICD-10-CM

## 2025-09-05 DIAGNOSIS — Z09 HOSPITAL DISCHARGE FOLLOW-UP: Primary | ICD-10-CM

## 2025-09-05 DIAGNOSIS — R26.89 IMBALANCE: ICD-10-CM

## 2025-09-05 RX ORDER — SACCHAROMYCES BOULARDII 250 MG
250 CAPSULE ORAL 2 TIMES DAILY
Qty: 180 CAPSULE | Refills: 1 | Status: SHIPPED | OUTPATIENT
Start: 2025-09-05

## 2025-09-05 ASSESSMENT — PATIENT HEALTH QUESTIONNAIRE - PHQ9
SUM OF ALL RESPONSES TO PHQ QUESTIONS 1-9: 0
1. LITTLE INTEREST OR PLEASURE IN DOING THINGS: NOT AT ALL
SUM OF ALL RESPONSES TO PHQ QUESTIONS 1-9: 0
2. FEELING DOWN, DEPRESSED OR HOPELESS: NOT AT ALL
SUM OF ALL RESPONSES TO PHQ QUESTIONS 1-9: 0
SUM OF ALL RESPONSES TO PHQ QUESTIONS 1-9: 0

## (undated) DEVICE — SOL IRR STRL H2O 500ML STRL --

## (undated) DEVICE — NDL CNTR 40CT FM MAG: Brand: MEDLINE INDUSTRIES, INC.

## (undated) DEVICE — GLOVE SURG SZ 65 THK91MIL LTX FREE SYN POLYISOPRENE

## (undated) DEVICE — GLOVE SURG SZ 6 THK91MIL LTX FREE SYN POLYISOPRENE ANTI

## (undated) DEVICE — SKIN MARKER,REGULAR TIP WITH RULER: Brand: DEVON

## (undated) DEVICE — GOWN,SIRUS,FABRNF,XL,20/CS: Brand: MEDLINE

## (undated) DEVICE — STERILE POLYISOPRENE POWDER-FREE SURGICAL GLOVES WITH EMOLLIENT COATING: Brand: PROTEXIS

## (undated) DEVICE — GOWN,PREVENTION PLUS,XL,ST,24/CS: Brand: MEDLINE